# Patient Record
Sex: MALE | Race: ASIAN | NOT HISPANIC OR LATINO | URBAN - METROPOLITAN AREA
[De-identification: names, ages, dates, MRNs, and addresses within clinical notes are randomized per-mention and may not be internally consistent; named-entity substitution may affect disease eponyms.]

---

## 2021-01-01 ENCOUNTER — INPATIENT (INPATIENT)
Facility: HOSPITAL | Age: 71
LOS: 17 days | End: 2021-05-05
Attending: INTERNAL MEDICINE | Admitting: INTERNAL MEDICINE
Payer: MEDICARE

## 2021-01-01 VITALS
OXYGEN SATURATION: 89 % | HEART RATE: 103 BPM | TEMPERATURE: 100 F | RESPIRATION RATE: 24 BRPM | SYSTOLIC BLOOD PRESSURE: 122 MMHG | DIASTOLIC BLOOD PRESSURE: 72 MMHG

## 2021-01-01 VITALS — HEART RATE: 129 BPM

## 2021-01-01 DIAGNOSIS — R77.8 OTHER SPECIFIED ABNORMALITIES OF PLASMA PROTEINS: ICD-10-CM

## 2021-01-01 DIAGNOSIS — Z95.5 PRESENCE OF CORONARY ANGIOPLASTY IMPLANT AND GRAFT: Chronic | ICD-10-CM

## 2021-01-01 DIAGNOSIS — I25.10 ATHEROSCLEROTIC HEART DISEASE OF NATIVE CORONARY ARTERY WITHOUT ANGINA PECTORIS: ICD-10-CM

## 2021-01-01 DIAGNOSIS — E11.65 TYPE 2 DIABETES MELLITUS WITH HYPERGLYCEMIA: ICD-10-CM

## 2021-01-01 DIAGNOSIS — J96.01 ACUTE RESPIRATORY FAILURE WITH HYPOXIA: ICD-10-CM

## 2021-01-01 DIAGNOSIS — Z02.9 ENCOUNTER FOR ADMINISTRATIVE EXAMINATIONS, UNSPECIFIED: ICD-10-CM

## 2021-01-01 DIAGNOSIS — E78.5 HYPERLIPIDEMIA, UNSPECIFIED: ICD-10-CM

## 2021-01-01 DIAGNOSIS — I10 ESSENTIAL (PRIMARY) HYPERTENSION: ICD-10-CM

## 2021-01-01 DIAGNOSIS — U07.1 COVID-19: ICD-10-CM

## 2021-01-01 DIAGNOSIS — R09.02 HYPOXEMIA: ICD-10-CM

## 2021-01-01 DIAGNOSIS — I48.91 UNSPECIFIED ATRIAL FIBRILLATION: ICD-10-CM

## 2021-01-01 DIAGNOSIS — Z29.9 ENCOUNTER FOR PROPHYLACTIC MEASURES, UNSPECIFIED: ICD-10-CM

## 2021-01-01 LAB
-  AMIKACIN: SIGNIFICANT CHANGE UP
-  AMIKACIN: SIGNIFICANT CHANGE UP
-  AMOXICILLIN/CLAVULANIC ACID: SIGNIFICANT CHANGE UP
-  AMOXICILLIN/CLAVULANIC ACID: SIGNIFICANT CHANGE UP
-  AMPICILLIN/SULBACTAM: SIGNIFICANT CHANGE UP
-  AMPICILLIN/SULBACTAM: SIGNIFICANT CHANGE UP
-  AMPICILLIN: SIGNIFICANT CHANGE UP
-  AMPICILLIN: SIGNIFICANT CHANGE UP
-  AZTREONAM: SIGNIFICANT CHANGE UP
-  AZTREONAM: SIGNIFICANT CHANGE UP
-  CEFAZOLIN: SIGNIFICANT CHANGE UP
-  CEFAZOLIN: SIGNIFICANT CHANGE UP
-  CEFEPIME: SIGNIFICANT CHANGE UP
-  CEFEPIME: SIGNIFICANT CHANGE UP
-  CEFOXITIN: SIGNIFICANT CHANGE UP
-  CEFOXITIN: SIGNIFICANT CHANGE UP
-  CEFTRIAXONE: SIGNIFICANT CHANGE UP
-  CEFTRIAXONE: SIGNIFICANT CHANGE UP
-  CIPROFLOXACIN: SIGNIFICANT CHANGE UP
-  CIPROFLOXACIN: SIGNIFICANT CHANGE UP
-  ERTAPENEM: SIGNIFICANT CHANGE UP
-  ERTAPENEM: SIGNIFICANT CHANGE UP
-  GENTAMICIN: SIGNIFICANT CHANGE UP
-  GENTAMICIN: SIGNIFICANT CHANGE UP
-  IMIPENEM: SIGNIFICANT CHANGE UP
-  IMIPENEM: SIGNIFICANT CHANGE UP
-  LEVOFLOXACIN: SIGNIFICANT CHANGE UP
-  LEVOFLOXACIN: SIGNIFICANT CHANGE UP
-  MEROPENEM: SIGNIFICANT CHANGE UP
-  MEROPENEM: SIGNIFICANT CHANGE UP
-  PIPERACILLIN/TAZOBACTAM: SIGNIFICANT CHANGE UP
-  PIPERACILLIN/TAZOBACTAM: SIGNIFICANT CHANGE UP
-  TOBRAMYCIN: SIGNIFICANT CHANGE UP
-  TOBRAMYCIN: SIGNIFICANT CHANGE UP
-  TRIMETHOPRIM/SULFAMETHOXAZOLE: SIGNIFICANT CHANGE UP
-  TRIMETHOPRIM/SULFAMETHOXAZOLE: SIGNIFICANT CHANGE UP
A1C WITH ESTIMATED AVERAGE GLUCOSE RESULT: 8.2 % — HIGH (ref 4–5.6)
A1C WITH ESTIMATED AVERAGE GLUCOSE RESULT: 8.4 % — HIGH (ref 4–5.6)
ALBUMIN SERPL ELPH-MCNC: 1.3 G/DL — LOW (ref 3.3–5)
ALBUMIN SERPL ELPH-MCNC: 1.6 G/DL — LOW (ref 3.3–5)
ALBUMIN SERPL ELPH-MCNC: 1.6 G/DL — LOW (ref 3.3–5)
ALBUMIN SERPL ELPH-MCNC: 2.1 G/DL — LOW (ref 3.3–5)
ALBUMIN SERPL ELPH-MCNC: 2.1 G/DL — LOW (ref 3.3–5)
ALBUMIN SERPL ELPH-MCNC: 2.3 G/DL — LOW (ref 3.3–5)
ALBUMIN SERPL ELPH-MCNC: 2.4 G/DL — LOW (ref 3.3–5)
ALBUMIN SERPL ELPH-MCNC: 2.4 G/DL — LOW (ref 3.3–5)
ALBUMIN SERPL ELPH-MCNC: 2.5 G/DL — LOW (ref 3.3–5)
ALBUMIN SERPL ELPH-MCNC: 2.6 G/DL — LOW (ref 3.3–5)
ALBUMIN SERPL ELPH-MCNC: 2.7 G/DL — LOW (ref 3.3–5)
ALBUMIN SERPL ELPH-MCNC: 2.8 G/DL — LOW (ref 3.3–5)
ALBUMIN SERPL ELPH-MCNC: 2.9 G/DL — LOW (ref 3.3–5)
ALBUMIN SERPL ELPH-MCNC: 3.2 G/DL — LOW (ref 3.3–5)
ALBUMIN SERPL ELPH-MCNC: 3.3 G/DL — SIGNIFICANT CHANGE UP (ref 3.3–5)
ALBUMIN SERPL ELPH-MCNC: 3.4 G/DL — SIGNIFICANT CHANGE UP (ref 3.3–5)
ALBUMIN SERPL ELPH-MCNC: 3.5 G/DL — SIGNIFICANT CHANGE UP (ref 3.3–5)
ALP SERPL-CCNC: 115 U/L — SIGNIFICANT CHANGE UP (ref 40–120)
ALP SERPL-CCNC: 120 U/L — SIGNIFICANT CHANGE UP (ref 40–120)
ALP SERPL-CCNC: 125 U/L — HIGH (ref 40–120)
ALP SERPL-CCNC: 133 U/L — HIGH (ref 40–120)
ALP SERPL-CCNC: 134 U/L — HIGH (ref 40–120)
ALP SERPL-CCNC: 144 U/L — HIGH (ref 40–120)
ALP SERPL-CCNC: 148 U/L — HIGH (ref 40–120)
ALP SERPL-CCNC: 148 U/L — HIGH (ref 40–120)
ALP SERPL-CCNC: 149 U/L — HIGH (ref 40–120)
ALP SERPL-CCNC: 150 U/L — HIGH (ref 40–120)
ALP SERPL-CCNC: 150 U/L — HIGH (ref 40–120)
ALP SERPL-CCNC: 151 U/L — HIGH (ref 40–120)
ALP SERPL-CCNC: 152 U/L — HIGH (ref 40–120)
ALP SERPL-CCNC: 157 U/L — HIGH (ref 40–120)
ALP SERPL-CCNC: 158 U/L — HIGH (ref 40–120)
ALP SERPL-CCNC: 166 U/L — HIGH (ref 40–120)
ALP SERPL-CCNC: 170 U/L — HIGH (ref 40–120)
ALP SERPL-CCNC: 172 U/L — HIGH (ref 40–120)
ALP SERPL-CCNC: 176 U/L — HIGH (ref 40–120)
ALP SERPL-CCNC: 177 U/L — HIGH (ref 40–120)
ALP SERPL-CCNC: 179 U/L — HIGH (ref 40–120)
ALP SERPL-CCNC: 197 U/L — HIGH (ref 40–120)
ALP SERPL-CCNC: 197 U/L — HIGH (ref 40–120)
ALP SERPL-CCNC: 93 U/L — SIGNIFICANT CHANGE UP (ref 40–120)
ALP SERPL-CCNC: 94 U/L — SIGNIFICANT CHANGE UP (ref 40–120)
ALP SERPL-CCNC: 97 U/L — SIGNIFICANT CHANGE UP (ref 40–120)
ALT FLD-CCNC: 14 U/L — SIGNIFICANT CHANGE UP (ref 4–41)
ALT FLD-CCNC: 15 U/L — SIGNIFICANT CHANGE UP (ref 4–41)
ALT FLD-CCNC: 15 U/L — SIGNIFICANT CHANGE UP (ref 4–41)
ALT FLD-CCNC: 16 U/L — SIGNIFICANT CHANGE UP (ref 4–41)
ALT FLD-CCNC: 16 U/L — SIGNIFICANT CHANGE UP (ref 4–41)
ALT FLD-CCNC: 18 U/L — SIGNIFICANT CHANGE UP (ref 4–41)
ALT FLD-CCNC: 19 U/L — SIGNIFICANT CHANGE UP (ref 4–41)
ALT FLD-CCNC: 20 U/L — SIGNIFICANT CHANGE UP (ref 4–41)
ALT FLD-CCNC: 21 U/L — SIGNIFICANT CHANGE UP (ref 4–41)
ALT FLD-CCNC: 22 U/L — SIGNIFICANT CHANGE UP (ref 4–41)
ALT FLD-CCNC: 22 U/L — SIGNIFICANT CHANGE UP (ref 4–41)
ALT FLD-CCNC: 23 U/L — SIGNIFICANT CHANGE UP (ref 4–41)
ALT FLD-CCNC: 26 U/L — SIGNIFICANT CHANGE UP (ref 4–41)
ALT FLD-CCNC: 26 U/L — SIGNIFICANT CHANGE UP (ref 4–41)
ALT FLD-CCNC: 27 U/L — SIGNIFICANT CHANGE UP (ref 4–41)
ALT FLD-CCNC: 32 U/L — SIGNIFICANT CHANGE UP (ref 4–41)
ALT FLD-CCNC: 32 U/L — SIGNIFICANT CHANGE UP (ref 4–41)
ALT FLD-CCNC: 33 U/L — SIGNIFICANT CHANGE UP (ref 4–41)
ALT FLD-CCNC: 36 U/L — SIGNIFICANT CHANGE UP (ref 4–41)
ALT FLD-CCNC: 41 U/L — SIGNIFICANT CHANGE UP (ref 4–41)
ALT FLD-CCNC: 44 U/L — HIGH (ref 4–41)
ALT FLD-CCNC: 47 U/L — HIGH (ref 4–41)
ALT FLD-CCNC: 49 U/L — HIGH (ref 4–41)
ALT FLD-CCNC: 54 U/L — HIGH (ref 4–41)
ALT FLD-CCNC: 55 U/L — HIGH (ref 4–41)
ALT FLD-CCNC: 783 U/L — HIGH (ref 4–41)
ANION GAP SERPL CALC-SCNC: 10 MMOL/L — SIGNIFICANT CHANGE UP (ref 7–14)
ANION GAP SERPL CALC-SCNC: 11 MMOL/L — SIGNIFICANT CHANGE UP (ref 7–14)
ANION GAP SERPL CALC-SCNC: 12 MMOL/L — SIGNIFICANT CHANGE UP (ref 7–14)
ANION GAP SERPL CALC-SCNC: 13 MMOL/L — SIGNIFICANT CHANGE UP (ref 7–14)
ANION GAP SERPL CALC-SCNC: 13 MMOL/L — SIGNIFICANT CHANGE UP (ref 7–14)
ANION GAP SERPL CALC-SCNC: 14 MMOL/L — SIGNIFICANT CHANGE UP (ref 7–14)
ANION GAP SERPL CALC-SCNC: 18 MMOL/L — HIGH (ref 7–14)
ANION GAP SERPL CALC-SCNC: 5 MMOL/L — LOW (ref 7–14)
ANION GAP SERPL CALC-SCNC: 6 MMOL/L — LOW (ref 7–14)
ANION GAP SERPL CALC-SCNC: 6 MMOL/L — LOW (ref 7–14)
ANION GAP SERPL CALC-SCNC: 7 MMOL/L — SIGNIFICANT CHANGE UP (ref 7–14)
ANION GAP SERPL CALC-SCNC: 8 MMOL/L — SIGNIFICANT CHANGE UP (ref 7–14)
ANION GAP SERPL CALC-SCNC: 9 MMOL/L — SIGNIFICANT CHANGE UP (ref 7–14)
ANION GAP SERPL CALC-SCNC: 9 MMOL/L — SIGNIFICANT CHANGE UP (ref 7–14)
ANISOCYTOSIS BLD QL: SLIGHT — SIGNIFICANT CHANGE UP
ANISOCYTOSIS BLD QL: SLIGHT — SIGNIFICANT CHANGE UP
APPEARANCE UR: ABNORMAL
APPEARANCE UR: CLEAR — SIGNIFICANT CHANGE UP
APTT BLD: 100.5 SEC — HIGH (ref 27–36.3)
APTT BLD: 102.7 SEC — HIGH (ref 27–36.3)
APTT BLD: 161.1 SEC — CRITICAL HIGH (ref 27–36.3)
APTT BLD: 169.9 SEC — CRITICAL HIGH (ref 27–36.3)
APTT BLD: 31.3 SEC — SIGNIFICANT CHANGE UP (ref 27–36.3)
APTT BLD: 31.7 SEC — SIGNIFICANT CHANGE UP (ref 27–36.3)
APTT BLD: 33.9 SEC — SIGNIFICANT CHANGE UP (ref 27–36.3)
APTT BLD: 39.9 SEC — HIGH (ref 27–36.3)
APTT BLD: 42.3 SEC — HIGH (ref 27–36.3)
APTT BLD: 47.5 SEC — HIGH (ref 27–36.3)
APTT BLD: 47.9 SEC — HIGH (ref 27–36.3)
APTT BLD: 58.1 SEC — HIGH (ref 27–36.3)
APTT BLD: 71.3 SEC — HIGH (ref 27–36.3)
APTT BLD: 74.2 SEC — HIGH (ref 27–36.3)
APTT BLD: 81.3 SEC — HIGH (ref 27–36.3)
APTT BLD: 85.8 SEC — HIGH (ref 27–36.3)
APTT BLD: 89 SEC — HIGH (ref 27–36.3)
APTT BLD: 90.6 SEC — HIGH (ref 27–36.3)
APTT BLD: 91.5 SEC — HIGH (ref 27–36.3)
APTT BLD: 96.7 SEC — HIGH (ref 27–36.3)
APTT BLD: 98.4 SEC — HIGH (ref 27–36.3)
AST SERPL-CCNC: 12 U/L — SIGNIFICANT CHANGE UP (ref 4–40)
AST SERPL-CCNC: 14 U/L — SIGNIFICANT CHANGE UP (ref 4–40)
AST SERPL-CCNC: 15 U/L — SIGNIFICANT CHANGE UP (ref 4–40)
AST SERPL-CCNC: 15 U/L — SIGNIFICANT CHANGE UP (ref 4–40)
AST SERPL-CCNC: 17 U/L — SIGNIFICANT CHANGE UP (ref 4–40)
AST SERPL-CCNC: 17 U/L — SIGNIFICANT CHANGE UP (ref 4–40)
AST SERPL-CCNC: 18 U/L — SIGNIFICANT CHANGE UP (ref 4–40)
AST SERPL-CCNC: 18 U/L — SIGNIFICANT CHANGE UP (ref 4–40)
AST SERPL-CCNC: 19 U/L — SIGNIFICANT CHANGE UP (ref 4–40)
AST SERPL-CCNC: 19 U/L — SIGNIFICANT CHANGE UP (ref 4–40)
AST SERPL-CCNC: 20 U/L — SIGNIFICANT CHANGE UP (ref 4–40)
AST SERPL-CCNC: 20 U/L — SIGNIFICANT CHANGE UP (ref 4–40)
AST SERPL-CCNC: 21 U/L — SIGNIFICANT CHANGE UP (ref 4–40)
AST SERPL-CCNC: 22 U/L — SIGNIFICANT CHANGE UP (ref 4–40)
AST SERPL-CCNC: 2308 U/L — HIGH (ref 4–40)
AST SERPL-CCNC: 26 U/L — SIGNIFICANT CHANGE UP (ref 4–40)
AST SERPL-CCNC: 28 U/L — SIGNIFICANT CHANGE UP (ref 4–40)
AST SERPL-CCNC: 31 U/L — SIGNIFICANT CHANGE UP (ref 4–40)
AST SERPL-CCNC: 31 U/L — SIGNIFICANT CHANGE UP (ref 4–40)
AST SERPL-CCNC: 32 U/L — SIGNIFICANT CHANGE UP (ref 4–40)
AST SERPL-CCNC: 42 U/L — HIGH (ref 4–40)
AST SERPL-CCNC: 44 U/L — HIGH (ref 4–40)
AST SERPL-CCNC: 46 U/L — HIGH (ref 4–40)
AST SERPL-CCNC: 48 U/L — HIGH (ref 4–40)
AST SERPL-CCNC: 55 U/L — HIGH (ref 4–40)
AST SERPL-CCNC: 57 U/L — HIGH (ref 4–40)
B PERT DNA SPEC QL NAA+PROBE: SIGNIFICANT CHANGE UP
BACTERIA # UR AUTO: NEGATIVE — SIGNIFICANT CHANGE UP
BACTERIA # UR AUTO: NEGATIVE — SIGNIFICANT CHANGE UP
BASE EXCESS BLDA CALC-SCNC: -1.5 MMOL/L — SIGNIFICANT CHANGE UP (ref -2–2)
BASE EXCESS BLDA CALC-SCNC: 0.6 MMOL/L — SIGNIFICANT CHANGE UP (ref -2–2)
BASE EXCESS BLDV CALC-SCNC: -2.2 MMOL/L — SIGNIFICANT CHANGE UP (ref -3–2)
BASOPHILS # BLD AUTO: 0 K/UL — SIGNIFICANT CHANGE UP (ref 0–0.2)
BASOPHILS # BLD AUTO: 0.02 K/UL — SIGNIFICANT CHANGE UP (ref 0–0.2)
BASOPHILS NFR BLD AUTO: 0 % — SIGNIFICANT CHANGE UP (ref 0–2)
BASOPHILS NFR BLD AUTO: 0.1 % — SIGNIFICANT CHANGE UP (ref 0–2)
BASOPHILS NFR BLD AUTO: 0.2 % — SIGNIFICANT CHANGE UP (ref 0–2)
BILIRUB DIRECT SERPL-MCNC: 0.2 MG/DL — SIGNIFICANT CHANGE UP (ref 0–0.2)
BILIRUB DIRECT SERPL-MCNC: 0.3 MG/DL — HIGH (ref 0–0.2)
BILIRUB DIRECT SERPL-MCNC: 0.3 MG/DL — HIGH (ref 0–0.2)
BILIRUB DIRECT SERPL-MCNC: 0.4 MG/DL — HIGH (ref 0–0.2)
BILIRUB DIRECT SERPL-MCNC: 0.6 MG/DL — HIGH (ref 0–0.2)
BILIRUB DIRECT SERPL-MCNC: 0.6 MG/DL — HIGH (ref 0–0.2)
BILIRUB DIRECT SERPL-MCNC: <0.2 MG/DL — SIGNIFICANT CHANGE UP (ref 0–0.2)
BILIRUB INDIRECT FLD-MCNC: 0.6 MG/DL — SIGNIFICANT CHANGE UP (ref 0–1)
BILIRUB INDIRECT FLD-MCNC: 0.6 MG/DL — SIGNIFICANT CHANGE UP (ref 0–1)
BILIRUB INDIRECT FLD-MCNC: 0.7 MG/DL — SIGNIFICANT CHANGE UP (ref 0–1)
BILIRUB INDIRECT FLD-MCNC: 0.7 MG/DL — SIGNIFICANT CHANGE UP (ref 0–1)
BILIRUB INDIRECT FLD-MCNC: 0.8 MG/DL — SIGNIFICANT CHANGE UP (ref 0–1)
BILIRUB INDIRECT FLD-MCNC: 0.9 MG/DL — SIGNIFICANT CHANGE UP (ref 0–1)
BILIRUB INDIRECT FLD-MCNC: >0.4 MG/DL — SIGNIFICANT CHANGE UP (ref 0–1)
BILIRUB SERPL-MCNC: 0.6 MG/DL — SIGNIFICANT CHANGE UP (ref 0.2–1.2)
BILIRUB SERPL-MCNC: 0.6 MG/DL — SIGNIFICANT CHANGE UP (ref 0.2–1.2)
BILIRUB SERPL-MCNC: 0.7 MG/DL — SIGNIFICANT CHANGE UP (ref 0.2–1.2)
BILIRUB SERPL-MCNC: 0.8 MG/DL — SIGNIFICANT CHANGE UP (ref 0.2–1.2)
BILIRUB SERPL-MCNC: 0.8 MG/DL — SIGNIFICANT CHANGE UP (ref 0.2–1.2)
BILIRUB SERPL-MCNC: 0.9 MG/DL — SIGNIFICANT CHANGE UP (ref 0.2–1.2)
BILIRUB SERPL-MCNC: 1 MG/DL — SIGNIFICANT CHANGE UP (ref 0.2–1.2)
BILIRUB SERPL-MCNC: 1.1 MG/DL — SIGNIFICANT CHANGE UP (ref 0.2–1.2)
BILIRUB SERPL-MCNC: 1.1 MG/DL — SIGNIFICANT CHANGE UP (ref 0.2–1.2)
BILIRUB SERPL-MCNC: 1.2 MG/DL — SIGNIFICANT CHANGE UP (ref 0.2–1.2)
BILIRUB SERPL-MCNC: 1.3 MG/DL — HIGH (ref 0.2–1.2)
BILIRUB SERPL-MCNC: 1.3 MG/DL — HIGH (ref 0.2–1.2)
BILIRUB SERPL-MCNC: 1.4 MG/DL — HIGH (ref 0.2–1.2)
BILIRUB SERPL-MCNC: 1.4 MG/DL — HIGH (ref 0.2–1.2)
BILIRUB SERPL-MCNC: 1.6 MG/DL — HIGH (ref 0.2–1.2)
BILIRUB UR-MCNC: NEGATIVE — SIGNIFICANT CHANGE UP
BILIRUB UR-MCNC: NEGATIVE — SIGNIFICANT CHANGE UP
BLD GP AB SCN SERPL QL: NEGATIVE — SIGNIFICANT CHANGE UP
BLOOD GAS ARTERIAL - LYTES,HGB,ICA,LACT RESULT: SIGNIFICANT CHANGE UP
BLOOD GAS ARTERIAL COMPREHENSIVE RESULT: SIGNIFICANT CHANGE UP
BLOOD GAS VENOUS - CREATININE: 0.8 MG/DL — SIGNIFICANT CHANGE UP (ref 0.5–1.3)
BLOOD GAS VENOUS COMPREHENSIVE RESULT: SIGNIFICANT CHANGE UP
BLOOD GAS VENOUS COMPREHENSIVE RESULT: SIGNIFICANT CHANGE UP
BUN SERPL-MCNC: 25 MG/DL — HIGH (ref 7–23)
BUN SERPL-MCNC: 28 MG/DL — HIGH (ref 7–23)
BUN SERPL-MCNC: 29 MG/DL — HIGH (ref 7–23)
BUN SERPL-MCNC: 30 MG/DL — HIGH (ref 7–23)
BUN SERPL-MCNC: 30 MG/DL — HIGH (ref 7–23)
BUN SERPL-MCNC: 31 MG/DL — HIGH (ref 7–23)
BUN SERPL-MCNC: 32 MG/DL — HIGH (ref 7–23)
BUN SERPL-MCNC: 34 MG/DL — HIGH (ref 7–23)
BUN SERPL-MCNC: 35 MG/DL — HIGH (ref 7–23)
BUN SERPL-MCNC: 37 MG/DL — HIGH (ref 7–23)
BUN SERPL-MCNC: 37 MG/DL — HIGH (ref 7–23)
BUN SERPL-MCNC: 42 MG/DL — HIGH (ref 7–23)
BUN SERPL-MCNC: 43 MG/DL — HIGH (ref 7–23)
BUN SERPL-MCNC: 50 MG/DL — HIGH (ref 7–23)
BUN SERPL-MCNC: 52 MG/DL — HIGH (ref 7–23)
BURR CELLS BLD QL SMEAR: PRESENT — SIGNIFICANT CHANGE UP
C PNEUM DNA SPEC QL NAA+PROBE: SIGNIFICANT CHANGE UP
CALCIUM SERPL-MCNC: 7.3 MG/DL — LOW (ref 8.4–10.5)
CALCIUM SERPL-MCNC: 7.5 MG/DL — LOW (ref 8.4–10.5)
CALCIUM SERPL-MCNC: 7.7 MG/DL — LOW (ref 8.4–10.5)
CALCIUM SERPL-MCNC: 7.8 MG/DL — LOW (ref 8.4–10.5)
CALCIUM SERPL-MCNC: 7.9 MG/DL — LOW (ref 8.4–10.5)
CALCIUM SERPL-MCNC: 8 MG/DL — LOW (ref 8.4–10.5)
CALCIUM SERPL-MCNC: 8.1 MG/DL — LOW (ref 8.4–10.5)
CALCIUM SERPL-MCNC: 8.1 MG/DL — LOW (ref 8.4–10.5)
CALCIUM SERPL-MCNC: 8.2 MG/DL — LOW (ref 8.4–10.5)
CALCIUM SERPL-MCNC: 8.3 MG/DL — LOW (ref 8.4–10.5)
CALCIUM SERPL-MCNC: 8.5 MG/DL — SIGNIFICANT CHANGE UP (ref 8.4–10.5)
CALCIUM SERPL-MCNC: 8.8 MG/DL — SIGNIFICANT CHANGE UP (ref 8.4–10.5)
CHLORIDE BLDV-SCNC: 110 MMOL/L — HIGH (ref 96–108)
CHLORIDE SERPL-SCNC: 100 MMOL/L — SIGNIFICANT CHANGE UP (ref 98–107)
CHLORIDE SERPL-SCNC: 101 MMOL/L — SIGNIFICANT CHANGE UP (ref 98–107)
CHLORIDE SERPL-SCNC: 102 MMOL/L — SIGNIFICANT CHANGE UP (ref 98–107)
CHLORIDE SERPL-SCNC: 102 MMOL/L — SIGNIFICANT CHANGE UP (ref 98–107)
CHLORIDE SERPL-SCNC: 104 MMOL/L — SIGNIFICANT CHANGE UP (ref 98–107)
CHLORIDE SERPL-SCNC: 92 MMOL/L — LOW (ref 98–107)
CHLORIDE SERPL-SCNC: 93 MMOL/L — LOW (ref 98–107)
CHLORIDE SERPL-SCNC: 95 MMOL/L — LOW (ref 98–107)
CHLORIDE SERPL-SCNC: 97 MMOL/L — LOW (ref 98–107)
CHLORIDE SERPL-SCNC: 97 MMOL/L — LOW (ref 98–107)
CHLORIDE SERPL-SCNC: 99 MMOL/L — SIGNIFICANT CHANGE UP (ref 98–107)
CHLORIDE UR-SCNC: <20 MMOL/L — SIGNIFICANT CHANGE UP
CHOLEST SERPL-MCNC: 101 MG/DL — SIGNIFICANT CHANGE UP
CK MB BLD-MCNC: 1.7 % — SIGNIFICANT CHANGE UP (ref 0–2.5)
CK MB BLD-MCNC: 2.2 % — SIGNIFICANT CHANGE UP (ref 0–2.5)
CK MB CFR SERPL CALC: 1.5 NG/ML — SIGNIFICANT CHANGE UP
CK MB CFR SERPL CALC: 2.8 NG/ML — SIGNIFICANT CHANGE UP
CK SERPL-CCNC: 130 U/L — SIGNIFICANT CHANGE UP (ref 30–200)
CK SERPL-CCNC: 88 U/L — SIGNIFICANT CHANGE UP (ref 30–200)
CO2 SERPL-SCNC: 19 MMOL/L — LOW (ref 22–31)
CO2 SERPL-SCNC: 20 MMOL/L — LOW (ref 22–31)
CO2 SERPL-SCNC: 20 MMOL/L — LOW (ref 22–31)
CO2 SERPL-SCNC: 21 MMOL/L — LOW (ref 22–31)
CO2 SERPL-SCNC: 22 MMOL/L — SIGNIFICANT CHANGE UP (ref 22–31)
CO2 SERPL-SCNC: 23 MMOL/L — SIGNIFICANT CHANGE UP (ref 22–31)
CO2 SERPL-SCNC: 23 MMOL/L — SIGNIFICANT CHANGE UP (ref 22–31)
CO2 SERPL-SCNC: 24 MMOL/L — SIGNIFICANT CHANGE UP (ref 22–31)
CO2 SERPL-SCNC: 26 MMOL/L — SIGNIFICANT CHANGE UP (ref 22–31)
CO2 SERPL-SCNC: 27 MMOL/L — SIGNIFICANT CHANGE UP (ref 22–31)
CO2 SERPL-SCNC: 27 MMOL/L — SIGNIFICANT CHANGE UP (ref 22–31)
CO2 SERPL-SCNC: 30 MMOL/L — SIGNIFICANT CHANGE UP (ref 22–31)
CO2 SERPL-SCNC: 31 MMOL/L — SIGNIFICANT CHANGE UP (ref 22–31)
COLOR SPEC: YELLOW — SIGNIFICANT CHANGE UP
COLOR SPEC: YELLOW — SIGNIFICANT CHANGE UP
COVID-19 SPIKE DOMAIN AB INTERP: POSITIVE
COVID-19 SPIKE DOMAIN ANTIBODY RESULT: >250 U/ML — HIGH
CREAT ?TM UR-MCNC: 84 MG/DL — SIGNIFICANT CHANGE UP
CREAT SERPL-MCNC: 0.54 MG/DL — SIGNIFICANT CHANGE UP (ref 0.5–1.3)
CREAT SERPL-MCNC: 0.55 MG/DL — SIGNIFICANT CHANGE UP (ref 0.5–1.3)
CREAT SERPL-MCNC: 0.56 MG/DL — SIGNIFICANT CHANGE UP (ref 0.5–1.3)
CREAT SERPL-MCNC: 0.58 MG/DL — SIGNIFICANT CHANGE UP (ref 0.5–1.3)
CREAT SERPL-MCNC: 0.59 MG/DL — SIGNIFICANT CHANGE UP (ref 0.5–1.3)
CREAT SERPL-MCNC: 0.59 MG/DL — SIGNIFICANT CHANGE UP (ref 0.5–1.3)
CREAT SERPL-MCNC: 0.6 MG/DL — SIGNIFICANT CHANGE UP (ref 0.5–1.3)
CREAT SERPL-MCNC: 0.63 MG/DL — SIGNIFICANT CHANGE UP (ref 0.5–1.3)
CREAT SERPL-MCNC: 0.63 MG/DL — SIGNIFICANT CHANGE UP (ref 0.5–1.3)
CREAT SERPL-MCNC: 0.64 MG/DL — SIGNIFICANT CHANGE UP (ref 0.5–1.3)
CREAT SERPL-MCNC: 0.66 MG/DL — SIGNIFICANT CHANGE UP (ref 0.5–1.3)
CREAT SERPL-MCNC: 0.67 MG/DL — SIGNIFICANT CHANGE UP (ref 0.5–1.3)
CREAT SERPL-MCNC: 0.68 MG/DL — SIGNIFICANT CHANGE UP (ref 0.5–1.3)
CREAT SERPL-MCNC: 0.69 MG/DL — SIGNIFICANT CHANGE UP (ref 0.5–1.3)
CREAT SERPL-MCNC: 0.72 MG/DL — SIGNIFICANT CHANGE UP (ref 0.5–1.3)
CREAT SERPL-MCNC: 0.72 MG/DL — SIGNIFICANT CHANGE UP (ref 0.5–1.3)
CREAT SERPL-MCNC: 0.73 MG/DL — SIGNIFICANT CHANGE UP (ref 0.5–1.3)
CREAT SERPL-MCNC: 0.75 MG/DL — SIGNIFICANT CHANGE UP (ref 0.5–1.3)
CREAT SERPL-MCNC: 0.76 MG/DL — SIGNIFICANT CHANGE UP (ref 0.5–1.3)
CREAT SERPL-MCNC: 0.97 MG/DL — SIGNIFICANT CHANGE UP (ref 0.5–1.3)
CRP SERPL-MCNC: 117.2 MG/L — HIGH
CRP SERPL-MCNC: 148.6 MG/L — HIGH
CRP SERPL-MCNC: 155.6 MG/L — HIGH
CRP SERPL-MCNC: 299.7 MG/L — HIGH
CRP SERPL-MCNC: 64.8 MG/L — HIGH
CULTURE RESULTS: SIGNIFICANT CHANGE UP
D DIMER BLD IA.RAPID-MCNC: 378 NG/ML DDU — HIGH
D DIMER BLD IA.RAPID-MCNC: 440 NG/ML DDU — HIGH
D DIMER BLD IA.RAPID-MCNC: 452 NG/ML DDU — HIGH
D DIMER BLD IA.RAPID-MCNC: 472 NG/ML DDU — HIGH
D DIMER BLD IA.RAPID-MCNC: 472 NG/ML DDU — HIGH
D DIMER BLD IA.RAPID-MCNC: 552 NG/ML DDU — HIGH
D DIMER BLD IA.RAPID-MCNC: 572 NG/ML DDU — HIGH
D DIMER BLD IA.RAPID-MCNC: 617 NG/ML DDU — HIGH
DIFF PNL FLD: NEGATIVE — SIGNIFICANT CHANGE UP
DIFF PNL FLD: NEGATIVE — SIGNIFICANT CHANGE UP
ELLIPTOCYTES BLD QL SMEAR: SLIGHT — SIGNIFICANT CHANGE UP
EOSINOPHIL # BLD AUTO: 0 K/UL — SIGNIFICANT CHANGE UP (ref 0–0.5)
EOSINOPHIL # BLD AUTO: 0.12 K/UL — SIGNIFICANT CHANGE UP (ref 0–0.5)
EOSINOPHIL # BLD AUTO: 0.24 K/UL — SIGNIFICANT CHANGE UP (ref 0–0.5)
EOSINOPHIL # BLD AUTO: 0.32 K/UL — SIGNIFICANT CHANGE UP (ref 0–0.5)
EOSINOPHIL NFR BLD AUTO: 0 % — SIGNIFICANT CHANGE UP (ref 0–6)
EOSINOPHIL NFR BLD AUTO: 0.9 % — SIGNIFICANT CHANGE UP (ref 0–6)
EOSINOPHIL NFR BLD AUTO: 1.6 % — SIGNIFICANT CHANGE UP (ref 0–6)
EOSINOPHIL NFR BLD AUTO: 1.8 % — SIGNIFICANT CHANGE UP (ref 0–6)
EPI CELLS # UR: 1 /HPF — SIGNIFICANT CHANGE UP (ref 0–5)
EPI CELLS # UR: SIGNIFICANT CHANGE UP /HPF (ref 0–5)
ESTIMATED AVERAGE GLUCOSE: 189 MG/DL — HIGH (ref 68–114)
ESTIMATED AVERAGE GLUCOSE: 194 MG/DL — HIGH (ref 68–114)
FERRITIN SERPL-MCNC: 1173 NG/ML — HIGH (ref 30–400)
FERRITIN SERPL-MCNC: 1210 NG/ML — HIGH (ref 30–400)
FERRITIN SERPL-MCNC: 1307 NG/ML — HIGH (ref 30–400)
FERRITIN SERPL-MCNC: 1331 NG/ML — HIGH (ref 30–400)
FERRITIN SERPL-MCNC: 1538 NG/ML — HIGH (ref 30–400)
FERRITIN SERPL-MCNC: 1545 NG/ML — HIGH (ref 30–400)
FERRITIN SERPL-MCNC: 3802 NG/ML — HIGH (ref 30–400)
FERRITIN SERPL-MCNC: 4663 NG/ML — HIGH (ref 30–400)
FERRITIN SERPL-MCNC: 996 NG/ML — HIGH (ref 30–400)
FLUAV SUBTYP SPEC NAA+PROBE: SIGNIFICANT CHANGE UP
FLUBV RNA SPEC QL NAA+PROBE: SIGNIFICANT CHANGE UP
GAS PNL BLDA: SIGNIFICANT CHANGE UP
GAS PNL BLDV: 134 MMOL/L — LOW (ref 136–146)
GIANT PLATELETS BLD QL SMEAR: PRESENT — SIGNIFICANT CHANGE UP
GLUCOSE BLDC GLUCOMTR-MCNC: 142 MG/DL — HIGH (ref 70–99)
GLUCOSE BLDC GLUCOMTR-MCNC: 149 MG/DL — HIGH (ref 70–99)
GLUCOSE BLDC GLUCOMTR-MCNC: 151 MG/DL — HIGH (ref 70–99)
GLUCOSE BLDC GLUCOMTR-MCNC: 153 MG/DL — HIGH (ref 70–99)
GLUCOSE BLDC GLUCOMTR-MCNC: 159 MG/DL — HIGH (ref 70–99)
GLUCOSE BLDC GLUCOMTR-MCNC: 169 MG/DL — HIGH (ref 70–99)
GLUCOSE BLDC GLUCOMTR-MCNC: 182 MG/DL — HIGH (ref 70–99)
GLUCOSE BLDC GLUCOMTR-MCNC: 196 MG/DL — HIGH (ref 70–99)
GLUCOSE BLDC GLUCOMTR-MCNC: 201 MG/DL — HIGH (ref 70–99)
GLUCOSE BLDC GLUCOMTR-MCNC: 203 MG/DL — HIGH (ref 70–99)
GLUCOSE BLDC GLUCOMTR-MCNC: 206 MG/DL — HIGH (ref 70–99)
GLUCOSE BLDC GLUCOMTR-MCNC: 208 MG/DL — HIGH (ref 70–99)
GLUCOSE BLDC GLUCOMTR-MCNC: 218 MG/DL — HIGH (ref 70–99)
GLUCOSE BLDC GLUCOMTR-MCNC: 222 MG/DL — HIGH (ref 70–99)
GLUCOSE BLDC GLUCOMTR-MCNC: 232 MG/DL — HIGH (ref 70–99)
GLUCOSE BLDC GLUCOMTR-MCNC: 232 MG/DL — HIGH (ref 70–99)
GLUCOSE BLDC GLUCOMTR-MCNC: 234 MG/DL — HIGH (ref 70–99)
GLUCOSE BLDC GLUCOMTR-MCNC: 288 MG/DL — HIGH (ref 70–99)
GLUCOSE BLDC GLUCOMTR-MCNC: 296 MG/DL — HIGH (ref 70–99)
GLUCOSE BLDV-MCNC: 246 MG/DL — HIGH (ref 70–99)
GLUCOSE SERPL-MCNC: 111 MG/DL — HIGH (ref 70–99)
GLUCOSE SERPL-MCNC: 133 MG/DL — HIGH (ref 70–99)
GLUCOSE SERPL-MCNC: 153 MG/DL — HIGH (ref 70–99)
GLUCOSE SERPL-MCNC: 180 MG/DL — HIGH (ref 70–99)
GLUCOSE SERPL-MCNC: 194 MG/DL — HIGH (ref 70–99)
GLUCOSE SERPL-MCNC: 200 MG/DL — HIGH (ref 70–99)
GLUCOSE SERPL-MCNC: 212 MG/DL — HIGH (ref 70–99)
GLUCOSE SERPL-MCNC: 212 MG/DL — HIGH (ref 70–99)
GLUCOSE SERPL-MCNC: 213 MG/DL — HIGH (ref 70–99)
GLUCOSE SERPL-MCNC: 241 MG/DL — HIGH (ref 70–99)
GLUCOSE SERPL-MCNC: 242 MG/DL — HIGH (ref 70–99)
GLUCOSE SERPL-MCNC: 244 MG/DL — HIGH (ref 70–99)
GLUCOSE SERPL-MCNC: 249 MG/DL — HIGH (ref 70–99)
GLUCOSE SERPL-MCNC: 255 MG/DL — HIGH (ref 70–99)
GLUCOSE SERPL-MCNC: 258 MG/DL — HIGH (ref 70–99)
GLUCOSE SERPL-MCNC: 260 MG/DL — HIGH (ref 70–99)
GLUCOSE SERPL-MCNC: 272 MG/DL — HIGH (ref 70–99)
GLUCOSE SERPL-MCNC: 281 MG/DL — HIGH (ref 70–99)
GLUCOSE SERPL-MCNC: 95 MG/DL — SIGNIFICANT CHANGE UP (ref 70–99)
GLUCOSE UR QL: ABNORMAL
GLUCOSE UR QL: ABNORMAL
GRAM STN FLD: SIGNIFICANT CHANGE UP
GRAM STN FLD: SIGNIFICANT CHANGE UP
HADV DNA SPEC QL NAA+PROBE: SIGNIFICANT CHANGE UP
HCO3 BLDA-SCNC: 24 MMOL/L — SIGNIFICANT CHANGE UP (ref 22–26)
HCO3 BLDA-SCNC: 24 MMOL/L — SIGNIFICANT CHANGE UP (ref 22–26)
HCO3 BLDV-SCNC: 23 MMOL/L — SIGNIFICANT CHANGE UP (ref 20–27)
HCOV 229E RNA SPEC QL NAA+PROBE: SIGNIFICANT CHANGE UP
HCOV HKU1 RNA SPEC QL NAA+PROBE: SIGNIFICANT CHANGE UP
HCOV NL63 RNA SPEC QL NAA+PROBE: SIGNIFICANT CHANGE UP
HCOV OC43 RNA SPEC QL NAA+PROBE: SIGNIFICANT CHANGE UP
HCT VFR BLD CALC: 30 % — LOW (ref 39–50)
HCT VFR BLD CALC: 30.9 % — LOW (ref 39–50)
HCT VFR BLD CALC: 33.2 % — LOW (ref 39–50)
HCT VFR BLD CALC: 33.8 % — LOW (ref 39–50)
HCT VFR BLD CALC: 34.1 % — LOW (ref 39–50)
HCT VFR BLD CALC: 36.7 % — LOW (ref 39–50)
HCT VFR BLD CALC: 39 % — SIGNIFICANT CHANGE UP (ref 39–50)
HCT VFR BLD CALC: 40 % — SIGNIFICANT CHANGE UP (ref 39–50)
HCT VFR BLD CALC: 40.1 % — SIGNIFICANT CHANGE UP (ref 39–50)
HCT VFR BLD CALC: 40.5 % — SIGNIFICANT CHANGE UP (ref 39–50)
HCT VFR BLD CALC: 40.6 % — SIGNIFICANT CHANGE UP (ref 39–50)
HCT VFR BLD CALC: 40.8 % — SIGNIFICANT CHANGE UP (ref 39–50)
HCT VFR BLD CALC: 41 % — SIGNIFICANT CHANGE UP (ref 39–50)
HCT VFR BLD CALC: 41.1 % — SIGNIFICANT CHANGE UP (ref 39–50)
HCT VFR BLD CALC: 41.9 % — SIGNIFICANT CHANGE UP (ref 39–50)
HCT VFR BLD CALC: 42.1 % — SIGNIFICANT CHANGE UP (ref 39–50)
HCT VFR BLD CALC: 42.3 % — SIGNIFICANT CHANGE UP (ref 39–50)
HCT VFR BLD CALC: 42.4 % — SIGNIFICANT CHANGE UP (ref 39–50)
HCT VFR BLD CALC: 42.5 % — SIGNIFICANT CHANGE UP (ref 39–50)
HCT VFR BLD CALC: 43.1 % — SIGNIFICANT CHANGE UP (ref 39–50)
HCT VFR BLD CALC: 44.3 % — SIGNIFICANT CHANGE UP (ref 39–50)
HCT VFR BLD CALC: 46.6 % — SIGNIFICANT CHANGE UP (ref 39–50)
HCT VFR BLDA CALC: 39.5 % — SIGNIFICANT CHANGE UP (ref 39–51)
HCV AB S/CO SERPL IA: 0.11 S/CO — SIGNIFICANT CHANGE UP (ref 0–0.99)
HCV AB SERPL-IMP: SIGNIFICANT CHANGE UP
HDLC SERPL-MCNC: 31 MG/DL — LOW
HGB BLD CALC-MCNC: 12.8 G/DL — LOW (ref 13–17)
HGB BLD-MCNC: 10.2 G/DL — LOW (ref 13–17)
HGB BLD-MCNC: 10.9 G/DL — LOW (ref 13–17)
HGB BLD-MCNC: 11.1 G/DL — LOW (ref 13–17)
HGB BLD-MCNC: 11.3 G/DL — LOW (ref 13–17)
HGB BLD-MCNC: 11.9 G/DL — LOW (ref 13–17)
HGB BLD-MCNC: 13.2 G/DL — SIGNIFICANT CHANGE UP (ref 13–17)
HGB BLD-MCNC: 13.3 G/DL — SIGNIFICANT CHANGE UP (ref 13–17)
HGB BLD-MCNC: 13.4 G/DL — SIGNIFICANT CHANGE UP (ref 13–17)
HGB BLD-MCNC: 13.6 G/DL — SIGNIFICANT CHANGE UP (ref 13–17)
HGB BLD-MCNC: 13.6 G/DL — SIGNIFICANT CHANGE UP (ref 13–17)
HGB BLD-MCNC: 13.9 G/DL — SIGNIFICANT CHANGE UP (ref 13–17)
HGB BLD-MCNC: 14 G/DL — SIGNIFICANT CHANGE UP (ref 13–17)
HGB BLD-MCNC: 14.2 G/DL — SIGNIFICANT CHANGE UP (ref 13–17)
HGB BLD-MCNC: 14.3 G/DL — SIGNIFICANT CHANGE UP (ref 13–17)
HGB BLD-MCNC: 14.4 G/DL — SIGNIFICANT CHANGE UP (ref 13–17)
HGB BLD-MCNC: 14.5 G/DL — SIGNIFICANT CHANGE UP (ref 13–17)
HGB BLD-MCNC: 14.6 G/DL — SIGNIFICANT CHANGE UP (ref 13–17)
HGB BLD-MCNC: 15 G/DL — SIGNIFICANT CHANGE UP (ref 13–17)
HGB BLD-MCNC: 16.3 G/DL — SIGNIFICANT CHANGE UP (ref 13–17)
HGB BLD-MCNC: 9.7 G/DL — LOW (ref 13–17)
HMPV RNA SPEC QL NAA+PROBE: SIGNIFICANT CHANGE UP
HPIV1 RNA SPEC QL NAA+PROBE: SIGNIFICANT CHANGE UP
HPIV2 RNA SPEC QL NAA+PROBE: SIGNIFICANT CHANGE UP
HPIV3 RNA SPEC QL NAA+PROBE: SIGNIFICANT CHANGE UP
HPIV4 RNA SPEC QL NAA+PROBE: SIGNIFICANT CHANGE UP
HYALINE CASTS # UR AUTO: 3 /LPF — SIGNIFICANT CHANGE UP (ref 0–7)
HYALINE CASTS # UR AUTO: SIGNIFICANT CHANGE UP /LPF (ref 0–7)
IANC: 10.44 K/UL — HIGH (ref 1.5–8.5)
IANC: 11.88 K/UL — HIGH (ref 1.5–8.5)
IANC: 12.13 K/UL — HIGH (ref 1.5–8.5)
IANC: 18.76 K/UL — HIGH (ref 1.5–8.5)
IANC: 25.11 K/UL — HIGH (ref 1.5–8.5)
IANC: 6.88 K/UL — SIGNIFICANT CHANGE UP (ref 1.5–8.5)
IANC: 8.17 K/UL — SIGNIFICANT CHANGE UP (ref 1.5–8.5)
IANC: 9.69 K/UL — HIGH (ref 1.5–8.5)
IMM GRANULOCYTES NFR BLD AUTO: 0.6 % — SIGNIFICANT CHANGE UP (ref 0–1.5)
IMM GRANULOCYTES NFR BLD AUTO: 1 % — SIGNIFICANT CHANGE UP (ref 0–1.5)
IMM GRANULOCYTES NFR BLD AUTO: 1.1 % — SIGNIFICANT CHANGE UP (ref 0–1.5)
IMM GRANULOCYTES NFR BLD AUTO: 1.5 % — SIGNIFICANT CHANGE UP (ref 0–1.5)
INR BLD: 1.04 RATIO — SIGNIFICANT CHANGE UP (ref 0.88–1.16)
INR BLD: 1.13 RATIO — SIGNIFICANT CHANGE UP (ref 0.88–1.16)
INR BLD: 1.18 RATIO — HIGH (ref 0.88–1.16)
INR BLD: 1.19 RATIO — HIGH (ref 0.88–1.16)
INR BLD: 1.21 RATIO — HIGH (ref 0.88–1.16)
INR BLD: 1.22 RATIO — HIGH (ref 0.88–1.16)
INR BLD: 1.22 RATIO — HIGH (ref 0.88–1.16)
INR BLD: 1.32 RATIO — HIGH (ref 0.88–1.16)
INR BLD: 1.35 RATIO — HIGH (ref 0.88–1.16)
INR BLD: 1.36 RATIO — HIGH (ref 0.88–1.16)
INR BLD: 1.37 RATIO — HIGH (ref 0.88–1.16)
INR BLD: 1.4 RATIO — HIGH (ref 0.88–1.16)
INR BLD: 1.42 RATIO — HIGH (ref 0.88–1.16)
INR BLD: 1.43 RATIO — HIGH (ref 0.88–1.16)
INR BLD: 1.5 RATIO — HIGH (ref 0.88–1.16)
INR BLD: 1.51 RATIO — HIGH (ref 0.88–1.16)
INR BLD: 1.82 RATIO — HIGH (ref 0.88–1.16)
KETONES UR-MCNC: ABNORMAL
KETONES UR-MCNC: NEGATIVE — SIGNIFICANT CHANGE UP
LACTATE BLDV-MCNC: 2.2 MMOL/L — HIGH (ref 0.5–2)
LDH SERPL L TO P-CCNC: 171 U/L — SIGNIFICANT CHANGE UP (ref 135–225)
LDH SERPL L TO P-CCNC: 241 U/L — HIGH (ref 135–225)
LDH SERPL L TO P-CCNC: 378 U/L — HIGH (ref 135–225)
LDH SERPL L TO P-CCNC: 461 U/L — HIGH (ref 135–225)
LDH SERPL L TO P-CCNC: 537 U/L — HIGH (ref 135–225)
LDH SERPL L TO P-CCNC: 544 U/L — HIGH (ref 135–225)
LDH SERPL L TO P-CCNC: 637 U/L — HIGH (ref 135–225)
LDH SERPL L TO P-CCNC: 643 U/L — HIGH (ref 135–225)
LDH SERPL L TO P-CCNC: 737 U/L — HIGH (ref 135–225)
LEUKOCYTE ESTERASE UR-ACNC: NEGATIVE — SIGNIFICANT CHANGE UP
LEUKOCYTE ESTERASE UR-ACNC: NEGATIVE — SIGNIFICANT CHANGE UP
LIPID PNL WITH DIRECT LDL SERPL: 41 MG/DL — SIGNIFICANT CHANGE UP
LYMPHOCYTES # BLD AUTO: 0.22 K/UL — LOW (ref 1–3.3)
LYMPHOCYTES # BLD AUTO: 0.24 K/UL — LOW (ref 1–3.3)
LYMPHOCYTES # BLD AUTO: 0.35 K/UL — LOW (ref 1–3.3)
LYMPHOCYTES # BLD AUTO: 0.47 K/UL — LOW (ref 1–3.3)
LYMPHOCYTES # BLD AUTO: 0.59 K/UL — LOW (ref 1–3.3)
LYMPHOCYTES # BLD AUTO: 0.64 K/UL — LOW (ref 1–3.3)
LYMPHOCYTES # BLD AUTO: 0.67 K/UL — LOW (ref 1–3.3)
LYMPHOCYTES # BLD AUTO: 0.74 K/UL — LOW (ref 1–3.3)
LYMPHOCYTES # BLD AUTO: 0.9 % — LOW (ref 13–44)
LYMPHOCYTES # BLD AUTO: 1.7 % — LOW (ref 13–44)
LYMPHOCYTES # BLD AUTO: 3.5 % — LOW (ref 13–44)
LYMPHOCYTES # BLD AUTO: 3.7 % — LOW (ref 13–44)
LYMPHOCYTES # BLD AUTO: 4.4 % — LOW (ref 13–44)
LYMPHOCYTES # BLD AUTO: 5.5 % — LOW (ref 13–44)
LYMPHOCYTES # BLD AUTO: 5.8 % — LOW (ref 13–44)
LYMPHOCYTES # BLD AUTO: 6.9 % — LOW (ref 13–44)
MACROCYTES BLD QL: SLIGHT — SIGNIFICANT CHANGE UP
MACROCYTES BLD QL: SLIGHT — SIGNIFICANT CHANGE UP
MAGNESIUM SERPL-MCNC: 2.2 MG/DL — SIGNIFICANT CHANGE UP (ref 1.6–2.6)
MAGNESIUM SERPL-MCNC: 2.3 MG/DL — SIGNIFICANT CHANGE UP (ref 1.6–2.6)
MAGNESIUM SERPL-MCNC: 2.4 MG/DL — SIGNIFICANT CHANGE UP (ref 1.6–2.6)
MAGNESIUM SERPL-MCNC: 2.5 MG/DL — SIGNIFICANT CHANGE UP (ref 1.6–2.6)
MAGNESIUM SERPL-MCNC: 2.7 MG/DL — HIGH (ref 1.6–2.6)
MAGNESIUM SERPL-MCNC: 2.9 MG/DL — HIGH (ref 1.6–2.6)
MANUAL SMEAR VERIFICATION: SIGNIFICANT CHANGE UP
MCHC RBC-ENTMCNC: 30.2 PG — SIGNIFICANT CHANGE UP (ref 27–34)
MCHC RBC-ENTMCNC: 30.5 PG — SIGNIFICANT CHANGE UP (ref 27–34)
MCHC RBC-ENTMCNC: 30.6 PG — SIGNIFICANT CHANGE UP (ref 27–34)
MCHC RBC-ENTMCNC: 30.7 PG — SIGNIFICANT CHANGE UP (ref 27–34)
MCHC RBC-ENTMCNC: 30.7 PG — SIGNIFICANT CHANGE UP (ref 27–34)
MCHC RBC-ENTMCNC: 30.8 PG — SIGNIFICANT CHANGE UP (ref 27–34)
MCHC RBC-ENTMCNC: 30.8 PG — SIGNIFICANT CHANGE UP (ref 27–34)
MCHC RBC-ENTMCNC: 30.9 PG — SIGNIFICANT CHANGE UP (ref 27–34)
MCHC RBC-ENTMCNC: 30.9 PG — SIGNIFICANT CHANGE UP (ref 27–34)
MCHC RBC-ENTMCNC: 31 PG — SIGNIFICANT CHANGE UP (ref 27–34)
MCHC RBC-ENTMCNC: 31 PG — SIGNIFICANT CHANGE UP (ref 27–34)
MCHC RBC-ENTMCNC: 31.1 PG — SIGNIFICANT CHANGE UP (ref 27–34)
MCHC RBC-ENTMCNC: 31.1 PG — SIGNIFICANT CHANGE UP (ref 27–34)
MCHC RBC-ENTMCNC: 31.2 PG — SIGNIFICANT CHANGE UP (ref 27–34)
MCHC RBC-ENTMCNC: 31.3 PG — SIGNIFICANT CHANGE UP (ref 27–34)
MCHC RBC-ENTMCNC: 31.4 GM/DL — LOW (ref 32–36)
MCHC RBC-ENTMCNC: 31.4 PG — SIGNIFICANT CHANGE UP (ref 27–34)
MCHC RBC-ENTMCNC: 31.7 PG — SIGNIFICANT CHANGE UP (ref 27–34)
MCHC RBC-ENTMCNC: 32.4 GM/DL — SIGNIFICANT CHANGE UP (ref 32–36)
MCHC RBC-ENTMCNC: 32.6 GM/DL — SIGNIFICANT CHANGE UP (ref 32–36)
MCHC RBC-ENTMCNC: 32.8 GM/DL — SIGNIFICANT CHANGE UP (ref 32–36)
MCHC RBC-ENTMCNC: 32.8 GM/DL — SIGNIFICANT CHANGE UP (ref 32–36)
MCHC RBC-ENTMCNC: 32.9 GM/DL — SIGNIFICANT CHANGE UP (ref 32–36)
MCHC RBC-ENTMCNC: 33.1 GM/DL — SIGNIFICANT CHANGE UP (ref 32–36)
MCHC RBC-ENTMCNC: 33.4 GM/DL — SIGNIFICANT CHANGE UP (ref 32–36)
MCHC RBC-ENTMCNC: 33.7 GM/DL — SIGNIFICANT CHANGE UP (ref 32–36)
MCHC RBC-ENTMCNC: 33.8 GM/DL — SIGNIFICANT CHANGE UP (ref 32–36)
MCHC RBC-ENTMCNC: 33.9 GM/DL — SIGNIFICANT CHANGE UP (ref 32–36)
MCHC RBC-ENTMCNC: 34 GM/DL — SIGNIFICANT CHANGE UP (ref 32–36)
MCHC RBC-ENTMCNC: 34.1 GM/DL — SIGNIFICANT CHANGE UP (ref 32–36)
MCHC RBC-ENTMCNC: 34.4 GM/DL — SIGNIFICANT CHANGE UP (ref 32–36)
MCHC RBC-ENTMCNC: 34.5 GM/DL — SIGNIFICANT CHANGE UP (ref 32–36)
MCHC RBC-ENTMCNC: 34.6 GM/DL — SIGNIFICANT CHANGE UP (ref 32–36)
MCHC RBC-ENTMCNC: 34.6 GM/DL — SIGNIFICANT CHANGE UP (ref 32–36)
MCHC RBC-ENTMCNC: 35 GM/DL — SIGNIFICANT CHANGE UP (ref 32–36)
MCV RBC AUTO: 88.1 FL — SIGNIFICANT CHANGE UP (ref 80–100)
MCV RBC AUTO: 89.4 FL — SIGNIFICANT CHANGE UP (ref 80–100)
MCV RBC AUTO: 89.5 FL — SIGNIFICANT CHANGE UP (ref 80–100)
MCV RBC AUTO: 89.6 FL — SIGNIFICANT CHANGE UP (ref 80–100)
MCV RBC AUTO: 89.8 FL — SIGNIFICANT CHANGE UP (ref 80–100)
MCV RBC AUTO: 90.2 FL — SIGNIFICANT CHANGE UP (ref 80–100)
MCV RBC AUTO: 90.3 FL — SIGNIFICANT CHANGE UP (ref 80–100)
MCV RBC AUTO: 90.4 FL — SIGNIFICANT CHANGE UP (ref 80–100)
MCV RBC AUTO: 90.7 FL — SIGNIFICANT CHANGE UP (ref 80–100)
MCV RBC AUTO: 90.9 FL — SIGNIFICANT CHANGE UP (ref 80–100)
MCV RBC AUTO: 90.9 FL — SIGNIFICANT CHANGE UP (ref 80–100)
MCV RBC AUTO: 91.1 FL — SIGNIFICANT CHANGE UP (ref 80–100)
MCV RBC AUTO: 91.6 FL — SIGNIFICANT CHANGE UP (ref 80–100)
MCV RBC AUTO: 92.6 FL — SIGNIFICANT CHANGE UP (ref 80–100)
MCV RBC AUTO: 93.2 FL — SIGNIFICANT CHANGE UP (ref 80–100)
MCV RBC AUTO: 93.6 FL — SIGNIFICANT CHANGE UP (ref 80–100)
MCV RBC AUTO: 94.6 FL — SIGNIFICANT CHANGE UP (ref 80–100)
MCV RBC AUTO: 95.1 FL — SIGNIFICANT CHANGE UP (ref 80–100)
MCV RBC AUTO: 95.8 FL — SIGNIFICANT CHANGE UP (ref 80–100)
MCV RBC AUTO: 97.5 FL — SIGNIFICANT CHANGE UP (ref 80–100)
METAMYELOCYTES # FLD: 0.9 % — SIGNIFICANT CHANGE UP (ref 0–1)
METHOD TYPE: SIGNIFICANT CHANGE UP
METHOD TYPE: SIGNIFICANT CHANGE UP
MONOCYTES # BLD AUTO: 0 K/UL — SIGNIFICANT CHANGE UP (ref 0–0.9)
MONOCYTES # BLD AUTO: 0.2 K/UL — SIGNIFICANT CHANGE UP (ref 0–0.9)
MONOCYTES # BLD AUTO: 0.22 K/UL — SIGNIFICANT CHANGE UP (ref 0–0.9)
MONOCYTES # BLD AUTO: 0.22 K/UL — SIGNIFICANT CHANGE UP (ref 0–0.9)
MONOCYTES # BLD AUTO: 0.25 K/UL — SIGNIFICANT CHANGE UP (ref 0–0.9)
MONOCYTES # BLD AUTO: 0.37 K/UL — SIGNIFICANT CHANGE UP (ref 0–0.9)
MONOCYTES # BLD AUTO: 0.4 K/UL — SIGNIFICANT CHANGE UP (ref 0–0.9)
MONOCYTES # BLD AUTO: 0.47 K/UL — SIGNIFICANT CHANGE UP (ref 0–0.9)
MONOCYTES NFR BLD AUTO: 0 % — LOW (ref 2–14)
MONOCYTES NFR BLD AUTO: 1.1 % — LOW (ref 2–14)
MONOCYTES NFR BLD AUTO: 1.7 % — LOW (ref 2–14)
MONOCYTES NFR BLD AUTO: 2.2 % — SIGNIFICANT CHANGE UP (ref 2–14)
MONOCYTES NFR BLD AUTO: 2.6 % — SIGNIFICANT CHANGE UP (ref 2–14)
MONOCYTES NFR BLD AUTO: 3.5 % — SIGNIFICANT CHANGE UP (ref 2–14)
MONOCYTES NFR BLD AUTO: 3.7 % — SIGNIFICANT CHANGE UP (ref 2–14)
MONOCYTES NFR BLD AUTO: 4 % — SIGNIFICANT CHANGE UP (ref 2–14)
MRSA PCR RESULT.: SIGNIFICANT CHANGE UP
NEUTROPHILS # BLD AUTO: 10.44 K/UL — HIGH (ref 1.8–7.4)
NEUTROPHILS # BLD AUTO: 12.13 K/UL — HIGH (ref 1.8–7.4)
NEUTROPHILS # BLD AUTO: 12.27 K/UL — HIGH (ref 1.8–7.4)
NEUTROPHILS # BLD AUTO: 18.76 K/UL — HIGH (ref 1.8–7.4)
NEUTROPHILS # BLD AUTO: 26.33 K/UL — HIGH (ref 1.8–7.4)
NEUTROPHILS # BLD AUTO: 7.33 K/UL — SIGNIFICANT CHANGE UP (ref 1.8–7.4)
NEUTROPHILS # BLD AUTO: 8.17 K/UL — HIGH (ref 1.8–7.4)
NEUTROPHILS # BLD AUTO: 9.69 K/UL — HIGH (ref 1.8–7.4)
NEUTROPHILS NFR BLD AUTO: 83.2 % — HIGH (ref 43–77)
NEUTROPHILS NFR BLD AUTO: 87.9 % — HIGH (ref 43–77)
NEUTROPHILS NFR BLD AUTO: 89.5 % — HIGH (ref 43–77)
NEUTROPHILS NFR BLD AUTO: 90.3 % — HIGH (ref 43–77)
NEUTROPHILS NFR BLD AUTO: 90.4 % — HIGH (ref 43–77)
NEUTROPHILS NFR BLD AUTO: 92.2 % — HIGH (ref 43–77)
NEUTROPHILS NFR BLD AUTO: 92.9 % — HIGH (ref 43–77)
NEUTROPHILS NFR BLD AUTO: 99.1 % — HIGH (ref 43–77)
NEUTS BAND # BLD: 2.6 % — SIGNIFICANT CHANGE UP (ref 0–6)
NEUTS BAND # BLD: 2.6 % — SIGNIFICANT CHANGE UP (ref 0–6)
NEUTS BAND # BLD: 8 % — HIGH (ref 0–6)
NITRITE UR-MCNC: NEGATIVE — SIGNIFICANT CHANGE UP
NITRITE UR-MCNC: NEGATIVE — SIGNIFICANT CHANGE UP
NON HDL CHOLESTEROL: 70 MG/DL — SIGNIFICANT CHANGE UP
NRBC # BLD: 0 /100 WBCS — SIGNIFICANT CHANGE UP
NRBC # FLD: 0 K/UL — SIGNIFICANT CHANGE UP
NRBC # FLD: 0.02 K/UL — HIGH
NT-PROBNP SERPL-SCNC: 1314 PG/ML — HIGH
ORGANISM # SPEC MICROSCOPIC CNT: SIGNIFICANT CHANGE UP
OVALOCYTES BLD QL SMEAR: SLIGHT — SIGNIFICANT CHANGE UP
PCO2 BLDA: 29 MMHG — LOW (ref 35–48)
PCO2 BLDA: 38 MMHG — SIGNIFICANT CHANGE UP (ref 35–48)
PCO2 BLDV: 35 MMHG — LOW (ref 42–55)
PH BLDA: 7.42 — SIGNIFICANT CHANGE UP (ref 7.35–7.45)
PH BLDA: 7.49 — HIGH (ref 7.35–7.45)
PH BLDV: 7.41 — SIGNIFICANT CHANGE UP (ref 7.32–7.43)
PH UR: 5.5 — SIGNIFICANT CHANGE UP (ref 5–8)
PH UR: 6 — SIGNIFICANT CHANGE UP (ref 5–8)
PHOSPHATE SERPL-MCNC: 2.1 MG/DL — LOW (ref 2.5–4.5)
PHOSPHATE SERPL-MCNC: 2.4 MG/DL — LOW (ref 2.5–4.5)
PHOSPHATE SERPL-MCNC: 2.6 MG/DL — SIGNIFICANT CHANGE UP (ref 2.5–4.5)
PHOSPHATE SERPL-MCNC: 2.6 MG/DL — SIGNIFICANT CHANGE UP (ref 2.5–4.5)
PHOSPHATE SERPL-MCNC: 3 MG/DL — SIGNIFICANT CHANGE UP (ref 2.5–4.5)
PHOSPHATE SERPL-MCNC: 3.1 MG/DL — SIGNIFICANT CHANGE UP (ref 2.5–4.5)
PHOSPHATE SERPL-MCNC: 3.4 MG/DL — SIGNIFICANT CHANGE UP (ref 2.5–4.5)
PHOSPHATE SERPL-MCNC: 3.6 MG/DL — SIGNIFICANT CHANGE UP (ref 2.5–4.5)
PHOSPHATE SERPL-MCNC: 3.7 MG/DL — SIGNIFICANT CHANGE UP (ref 2.5–4.5)
PHOSPHATE SERPL-MCNC: 3.8 MG/DL — SIGNIFICANT CHANGE UP (ref 2.5–4.5)
PHOSPHATE SERPL-MCNC: 4.3 MG/DL — SIGNIFICANT CHANGE UP (ref 2.5–4.5)
PHOSPHATE SERPL-MCNC: 4.4 MG/DL — SIGNIFICANT CHANGE UP (ref 2.5–4.5)
PHOSPHATE SERPL-MCNC: 4.7 MG/DL — HIGH (ref 2.5–4.5)
PLAT MORPH BLD: ABNORMAL
PLAT MORPH BLD: ABNORMAL
PLAT MORPH BLD: NORMAL — SIGNIFICANT CHANGE UP
PLAT MORPH BLD: NORMAL — SIGNIFICANT CHANGE UP
PLATELET # BLD AUTO: 100 K/UL — LOW (ref 150–400)
PLATELET # BLD AUTO: 100 K/UL — LOW (ref 150–400)
PLATELET # BLD AUTO: 113 K/UL — LOW (ref 150–400)
PLATELET # BLD AUTO: 114 K/UL — LOW (ref 150–400)
PLATELET # BLD AUTO: 118 K/UL — LOW (ref 150–400)
PLATELET # BLD AUTO: 123 K/UL — LOW (ref 150–400)
PLATELET # BLD AUTO: 146 K/UL — LOW (ref 150–400)
PLATELET # BLD AUTO: 155 K/UL — SIGNIFICANT CHANGE UP (ref 150–400)
PLATELET # BLD AUTO: 161 K/UL — SIGNIFICANT CHANGE UP (ref 150–400)
PLATELET # BLD AUTO: 165 K/UL — SIGNIFICANT CHANGE UP (ref 150–400)
PLATELET # BLD AUTO: 174 K/UL — SIGNIFICANT CHANGE UP (ref 150–400)
PLATELET # BLD AUTO: 175 K/UL — SIGNIFICANT CHANGE UP (ref 150–400)
PLATELET # BLD AUTO: 179 K/UL — SIGNIFICANT CHANGE UP (ref 150–400)
PLATELET # BLD AUTO: 185 K/UL — SIGNIFICANT CHANGE UP (ref 150–400)
PLATELET # BLD AUTO: 187 K/UL — SIGNIFICANT CHANGE UP (ref 150–400)
PLATELET # BLD AUTO: 188 K/UL — SIGNIFICANT CHANGE UP (ref 150–400)
PLATELET # BLD AUTO: 196 K/UL — SIGNIFICANT CHANGE UP (ref 150–400)
PLATELET # BLD AUTO: 209 K/UL — SIGNIFICANT CHANGE UP (ref 150–400)
PLATELET # BLD AUTO: 223 K/UL — SIGNIFICANT CHANGE UP (ref 150–400)
PLATELET # BLD AUTO: 234 K/UL — SIGNIFICANT CHANGE UP (ref 150–400)
PLATELET # BLD AUTO: 255 K/UL — SIGNIFICANT CHANGE UP (ref 150–400)
PLATELET # BLD AUTO: 290 K/UL — SIGNIFICANT CHANGE UP (ref 150–400)
PLATELET COUNT - ESTIMATE: ABNORMAL
PLATELET COUNT - ESTIMATE: ABNORMAL
PLATELET COUNT - ESTIMATE: NORMAL — SIGNIFICANT CHANGE UP
PLATELET COUNT - ESTIMATE: NORMAL — SIGNIFICANT CHANGE UP
PO2 BLDA: 25 MMHG — CRITICAL LOW (ref 83–108)
PO2 BLDA: 59 MMHG — LOW (ref 83–108)
PO2 BLDV: 78 MMHG — HIGH (ref 35–40)
POIKILOCYTOSIS BLD QL AUTO: SIGNIFICANT CHANGE UP
POIKILOCYTOSIS BLD QL AUTO: SLIGHT — SIGNIFICANT CHANGE UP
POIKILOCYTOSIS BLD QL AUTO: SLIGHT — SIGNIFICANT CHANGE UP
POLYCHROMASIA BLD QL SMEAR: SIGNIFICANT CHANGE UP
POLYCHROMASIA BLD QL SMEAR: SLIGHT — SIGNIFICANT CHANGE UP
POLYCHROMASIA BLD QL SMEAR: SLIGHT — SIGNIFICANT CHANGE UP
POTASSIUM BLDV-SCNC: 4.2 MMOL/L — SIGNIFICANT CHANGE UP (ref 3.4–4.5)
POTASSIUM SERPL-MCNC: 4.1 MMOL/L — SIGNIFICANT CHANGE UP (ref 3.5–5.3)
POTASSIUM SERPL-MCNC: 4.2 MMOL/L — SIGNIFICANT CHANGE UP (ref 3.5–5.3)
POTASSIUM SERPL-MCNC: 4.3 MMOL/L — SIGNIFICANT CHANGE UP (ref 3.5–5.3)
POTASSIUM SERPL-MCNC: 4.3 MMOL/L — SIGNIFICANT CHANGE UP (ref 3.5–5.3)
POTASSIUM SERPL-MCNC: 4.4 MMOL/L — SIGNIFICANT CHANGE UP (ref 3.5–5.3)
POTASSIUM SERPL-MCNC: 4.5 MMOL/L — SIGNIFICANT CHANGE UP (ref 3.5–5.3)
POTASSIUM SERPL-MCNC: 4.6 MMOL/L — SIGNIFICANT CHANGE UP (ref 3.5–5.3)
POTASSIUM SERPL-MCNC: 4.7 MMOL/L — SIGNIFICANT CHANGE UP (ref 3.5–5.3)
POTASSIUM SERPL-MCNC: 4.7 MMOL/L — SIGNIFICANT CHANGE UP (ref 3.5–5.3)
POTASSIUM SERPL-MCNC: 4.8 MMOL/L — SIGNIFICANT CHANGE UP (ref 3.5–5.3)
POTASSIUM SERPL-MCNC: 4.9 MMOL/L — SIGNIFICANT CHANGE UP (ref 3.5–5.3)
POTASSIUM SERPL-MCNC: 4.9 MMOL/L — SIGNIFICANT CHANGE UP (ref 3.5–5.3)
POTASSIUM SERPL-SCNC: 4.1 MMOL/L — SIGNIFICANT CHANGE UP (ref 3.5–5.3)
POTASSIUM SERPL-SCNC: 4.2 MMOL/L — SIGNIFICANT CHANGE UP (ref 3.5–5.3)
POTASSIUM SERPL-SCNC: 4.3 MMOL/L — SIGNIFICANT CHANGE UP (ref 3.5–5.3)
POTASSIUM SERPL-SCNC: 4.3 MMOL/L — SIGNIFICANT CHANGE UP (ref 3.5–5.3)
POTASSIUM SERPL-SCNC: 4.4 MMOL/L — SIGNIFICANT CHANGE UP (ref 3.5–5.3)
POTASSIUM SERPL-SCNC: 4.5 MMOL/L — SIGNIFICANT CHANGE UP (ref 3.5–5.3)
POTASSIUM SERPL-SCNC: 4.6 MMOL/L — SIGNIFICANT CHANGE UP (ref 3.5–5.3)
POTASSIUM SERPL-SCNC: 4.7 MMOL/L — SIGNIFICANT CHANGE UP (ref 3.5–5.3)
POTASSIUM SERPL-SCNC: 4.7 MMOL/L — SIGNIFICANT CHANGE UP (ref 3.5–5.3)
POTASSIUM SERPL-SCNC: 4.8 MMOL/L — SIGNIFICANT CHANGE UP (ref 3.5–5.3)
POTASSIUM SERPL-SCNC: 4.9 MMOL/L — SIGNIFICANT CHANGE UP (ref 3.5–5.3)
POTASSIUM SERPL-SCNC: 4.9 MMOL/L — SIGNIFICANT CHANGE UP (ref 3.5–5.3)
POTASSIUM UR-SCNC: 63.1 MMOL/L — SIGNIFICANT CHANGE UP
PROCALCITONIN SERPL-MCNC: 0.07 NG/ML — SIGNIFICANT CHANGE UP (ref 0.02–0.1)
PROCALCITONIN SERPL-MCNC: 0.1 NG/ML — SIGNIFICANT CHANGE UP (ref 0.02–0.1)
PROCALCITONIN SERPL-MCNC: 0.13 NG/ML — HIGH (ref 0.02–0.1)
PROCALCITONIN SERPL-MCNC: 0.2 NG/ML — HIGH (ref 0.02–0.1)
PROCALCITONIN SERPL-MCNC: 0.31 NG/ML — HIGH (ref 0.02–0.1)
PROCALCITONIN SERPL-MCNC: 0.52 NG/ML — HIGH (ref 0.02–0.1)
PROCALCITONIN SERPL-MCNC: 0.58 NG/ML — HIGH (ref 0.02–0.1)
PROCALCITONIN SERPL-MCNC: 0.59 NG/ML — HIGH (ref 0.02–0.1)
PROCALCITONIN SERPL-MCNC: 1.22 NG/ML — HIGH (ref 0.02–0.1)
PROCALCITONIN SERPL-MCNC: 1.55 NG/ML — HIGH (ref 0.02–0.1)
PROT SERPL-MCNC: 5.2 G/DL — LOW (ref 6–8.3)
PROT SERPL-MCNC: 5.3 G/DL — LOW (ref 6–8.3)
PROT SERPL-MCNC: 5.4 G/DL — LOW (ref 6–8.3)
PROT SERPL-MCNC: 5.5 G/DL — LOW (ref 6–8.3)
PROT SERPL-MCNC: 5.6 G/DL — LOW (ref 6–8.3)
PROT SERPL-MCNC: 5.7 G/DL — LOW (ref 6–8.3)
PROT SERPL-MCNC: 5.7 G/DL — LOW (ref 6–8.3)
PROT SERPL-MCNC: 5.9 G/DL — LOW (ref 6–8.3)
PROT SERPL-MCNC: 6 G/DL — SIGNIFICANT CHANGE UP (ref 6–8.3)
PROT SERPL-MCNC: 6.1 G/DL — SIGNIFICANT CHANGE UP (ref 6–8.3)
PROT SERPL-MCNC: 6.1 G/DL — SIGNIFICANT CHANGE UP (ref 6–8.3)
PROT SERPL-MCNC: 6.2 G/DL — SIGNIFICANT CHANGE UP (ref 6–8.3)
PROT SERPL-MCNC: 6.4 G/DL — SIGNIFICANT CHANGE UP (ref 6–8.3)
PROT SERPL-MCNC: 6.9 G/DL — SIGNIFICANT CHANGE UP (ref 6–8.3)
PROT SERPL-MCNC: 6.9 G/DL — SIGNIFICANT CHANGE UP (ref 6–8.3)
PROT SERPL-MCNC: 7 G/DL — SIGNIFICANT CHANGE UP (ref 6–8.3)
PROT SERPL-MCNC: 7.2 G/DL — SIGNIFICANT CHANGE UP (ref 6–8.3)
PROT UR-MCNC: ABNORMAL
PROT UR-MCNC: ABNORMAL
PROTHROM AB SERPL-ACNC: 11.8 SEC — SIGNIFICANT CHANGE UP (ref 10.6–13.6)
PROTHROM AB SERPL-ACNC: 12.9 SEC — SIGNIFICANT CHANGE UP (ref 10.6–13.6)
PROTHROM AB SERPL-ACNC: 13.4 SEC — SIGNIFICANT CHANGE UP (ref 10.6–13.6)
PROTHROM AB SERPL-ACNC: 13.5 SEC — SIGNIFICANT CHANGE UP (ref 10.6–13.6)
PROTHROM AB SERPL-ACNC: 13.8 SEC — HIGH (ref 10.6–13.6)
PROTHROM AB SERPL-ACNC: 13.8 SEC — HIGH (ref 10.6–13.6)
PROTHROM AB SERPL-ACNC: 13.9 SEC — HIGH (ref 10.6–13.6)
PROTHROM AB SERPL-ACNC: 14.8 SEC — HIGH (ref 10.6–13.6)
PROTHROM AB SERPL-ACNC: 15.3 SEC — HIGH (ref 10.6–13.6)
PROTHROM AB SERPL-ACNC: 15.3 SEC — HIGH (ref 10.6–13.6)
PROTHROM AB SERPL-ACNC: 15.5 SEC — HIGH (ref 10.6–13.6)
PROTHROM AB SERPL-ACNC: 15.8 SEC — HIGH (ref 10.6–13.6)
PROTHROM AB SERPL-ACNC: 15.9 SEC — HIGH (ref 10.6–13.6)
PROTHROM AB SERPL-ACNC: 16.2 SEC — HIGH (ref 10.6–13.6)
PROTHROM AB SERPL-ACNC: 16.9 SEC — HIGH (ref 10.6–13.6)
PROTHROM AB SERPL-ACNC: 16.9 SEC — HIGH (ref 10.6–13.6)
PROTHROM AB SERPL-ACNC: 20.3 SEC — HIGH (ref 10.6–13.6)
RAPID RVP RESULT: DETECTED
RBC # BLD: 3.17 M/UL — LOW (ref 4.2–5.8)
RBC # BLD: 3.22 M/UL — LOW (ref 4.2–5.8)
RBC # BLD: 3.51 M/UL — LOW (ref 4.2–5.8)
RBC # BLD: 3.56 M/UL — LOW (ref 4.2–5.8)
RBC # BLD: 3.61 M/UL — LOW (ref 4.2–5.8)
RBC # BLD: 3.88 M/UL — LOW (ref 4.2–5.8)
RBC # BLD: 4.24 M/UL — SIGNIFICANT CHANGE UP (ref 4.2–5.8)
RBC # BLD: 4.26 M/UL — SIGNIFICANT CHANGE UP (ref 4.2–5.8)
RBC # BLD: 4.29 M/UL — SIGNIFICANT CHANGE UP (ref 4.2–5.8)
RBC # BLD: 4.43 M/UL — SIGNIFICANT CHANGE UP (ref 4.2–5.8)
RBC # BLD: 4.44 M/UL — SIGNIFICANT CHANGE UP (ref 4.2–5.8)
RBC # BLD: 4.48 M/UL — SIGNIFICANT CHANGE UP (ref 4.2–5.8)
RBC # BLD: 4.53 M/UL — SIGNIFICANT CHANGE UP (ref 4.2–5.8)
RBC # BLD: 4.58 M/UL — SIGNIFICANT CHANGE UP (ref 4.2–5.8)
RBC # BLD: 4.62 M/UL — SIGNIFICANT CHANGE UP (ref 4.2–5.8)
RBC # BLD: 4.63 M/UL — SIGNIFICANT CHANGE UP (ref 4.2–5.8)
RBC # BLD: 4.69 M/UL — SIGNIFICANT CHANGE UP (ref 4.2–5.8)
RBC # BLD: 4.71 M/UL — SIGNIFICANT CHANGE UP (ref 4.2–5.8)
RBC # BLD: 4.73 M/UL — SIGNIFICANT CHANGE UP (ref 4.2–5.8)
RBC # BLD: 4.74 M/UL — SIGNIFICANT CHANGE UP (ref 4.2–5.8)
RBC # BLD: 4.9 M/UL — SIGNIFICANT CHANGE UP (ref 4.2–5.8)
RBC # BLD: 5.29 M/UL — SIGNIFICANT CHANGE UP (ref 4.2–5.8)
RBC # FLD: 12.3 % — SIGNIFICANT CHANGE UP (ref 10.3–14.5)
RBC # FLD: 12.4 % — SIGNIFICANT CHANGE UP (ref 10.3–14.5)
RBC # FLD: 12.4 % — SIGNIFICANT CHANGE UP (ref 10.3–14.5)
RBC # FLD: 12.6 % — SIGNIFICANT CHANGE UP (ref 10.3–14.5)
RBC # FLD: 12.6 % — SIGNIFICANT CHANGE UP (ref 10.3–14.5)
RBC # FLD: 12.7 % — SIGNIFICANT CHANGE UP (ref 10.3–14.5)
RBC # FLD: 12.8 % — SIGNIFICANT CHANGE UP (ref 10.3–14.5)
RBC # FLD: 12.8 % — SIGNIFICANT CHANGE UP (ref 10.3–14.5)
RBC # FLD: 12.9 % — SIGNIFICANT CHANGE UP (ref 10.3–14.5)
RBC # FLD: 12.9 % — SIGNIFICANT CHANGE UP (ref 10.3–14.5)
RBC # FLD: 13 % — SIGNIFICANT CHANGE UP (ref 10.3–14.5)
RBC # FLD: 13.2 % — SIGNIFICANT CHANGE UP (ref 10.3–14.5)
RBC # FLD: 13.3 % — SIGNIFICANT CHANGE UP (ref 10.3–14.5)
RBC # FLD: 13.8 % — SIGNIFICANT CHANGE UP (ref 10.3–14.5)
RBC # FLD: 13.8 % — SIGNIFICANT CHANGE UP (ref 10.3–14.5)
RBC # FLD: 14.5 % — SIGNIFICANT CHANGE UP (ref 10.3–14.5)
RBC BLD AUTO: ABNORMAL
RBC BLD AUTO: NORMAL — SIGNIFICANT CHANGE UP
RBC CASTS # UR COMP ASSIST: 3 /HPF — SIGNIFICANT CHANGE UP (ref 0–4)
RBC CASTS # UR COMP ASSIST: <5 /HPF — HIGH (ref 0–4)
RH IG SCN BLD-IMP: POSITIVE — SIGNIFICANT CHANGE UP
RSV RNA SPEC QL NAA+PROBE: SIGNIFICANT CHANGE UP
RV+EV RNA SPEC QL NAA+PROBE: SIGNIFICANT CHANGE UP
S AUREUS DNA NOSE QL NAA+PROBE: SIGNIFICANT CHANGE UP
SAO2 % BLDA: 38.1 % — LOW (ref 95–99)
SAO2 % BLDA: 89.7 % — LOW (ref 95–99)
SAO2 % BLDV: 93.6 % — HIGH (ref 60–85)
SARS-COV-2 IGG+IGM SERPL QL IA: >250 U/ML — HIGH
SARS-COV-2 IGG+IGM SERPL QL IA: POSITIVE
SARS-COV-2 RNA SPEC QL NAA+PROBE: DETECTED
SARS-COV-2 RNA SPEC QL NAA+PROBE: DETECTED
SMUDGE CELLS # BLD: PRESENT — SIGNIFICANT CHANGE UP
SODIUM SERPL-SCNC: 127 MMOL/L — LOW (ref 135–145)
SODIUM SERPL-SCNC: 128 MMOL/L — LOW (ref 135–145)
SODIUM SERPL-SCNC: 128 MMOL/L — LOW (ref 135–145)
SODIUM SERPL-SCNC: 129 MMOL/L — LOW (ref 135–145)
SODIUM SERPL-SCNC: 131 MMOL/L — LOW (ref 135–145)
SODIUM SERPL-SCNC: 132 MMOL/L — LOW (ref 135–145)
SODIUM SERPL-SCNC: 133 MMOL/L — LOW (ref 135–145)
SODIUM SERPL-SCNC: 134 MMOL/L — LOW (ref 135–145)
SODIUM SERPL-SCNC: 135 MMOL/L — SIGNIFICANT CHANGE UP (ref 135–145)
SODIUM SERPL-SCNC: 136 MMOL/L — SIGNIFICANT CHANGE UP (ref 135–145)
SODIUM SERPL-SCNC: 137 MMOL/L — SIGNIFICANT CHANGE UP (ref 135–145)
SODIUM UR-SCNC: <20 MMOL/L — SIGNIFICANT CHANGE UP
SP GR SPEC: 1.03 — HIGH (ref 1.01–1.02)
SP GR SPEC: 1.04 — HIGH (ref 1.01–1.02)
SPECIMEN SOURCE: SIGNIFICANT CHANGE UP
T4 FREE SERPL-MCNC: 1.4 NG/DL — SIGNIFICANT CHANGE UP (ref 0.9–1.8)
TRIGL SERPL-MCNC: 145 MG/DL — SIGNIFICANT CHANGE UP
TRIGL SERPL-MCNC: 172 MG/DL — HIGH
TRIGL SERPL-MCNC: 217 MG/DL — HIGH
TRIGL SERPL-MCNC: 262 MG/DL — HIGH
TROPONIN T, HIGH SENSITIVITY RESULT: 118 NG/L — CRITICAL HIGH
TROPONIN T, HIGH SENSITIVITY RESULT: 53 NG/L — CRITICAL HIGH
TSH SERPL-MCNC: 0.2 UIU/ML — LOW (ref 0.27–4.2)
UROBILINOGEN FLD QL: ABNORMAL
UROBILINOGEN FLD QL: ABNORMAL
UUN UR-MCNC: 583.6 MG/DL — SIGNIFICANT CHANGE UP
VANCOMYCIN TROUGH SERPL-MCNC: 7.6 UG/ML — LOW (ref 10–20)
WBC # BLD: 10.29 K/UL — SIGNIFICANT CHANGE UP (ref 3.8–10.5)
WBC # BLD: 10.32 K/UL — SIGNIFICANT CHANGE UP (ref 3.8–10.5)
WBC # BLD: 10.6 K/UL — HIGH (ref 3.8–10.5)
WBC # BLD: 10.82 K/UL — HIGH (ref 3.8–10.5)
WBC # BLD: 11.15 K/UL — HIGH (ref 3.8–10.5)
WBC # BLD: 11.19 K/UL — HIGH (ref 3.8–10.5)
WBC # BLD: 11.28 K/UL — HIGH (ref 3.8–10.5)
WBC # BLD: 11.48 K/UL — HIGH (ref 3.8–10.5)
WBC # BLD: 11.55 K/UL — HIGH (ref 3.8–10.5)
WBC # BLD: 12.14 K/UL — HIGH (ref 3.8–10.5)
WBC # BLD: 12.94 K/UL — HIGH (ref 3.8–10.5)
WBC # BLD: 13.3 K/UL — HIGH (ref 3.8–10.5)
WBC # BLD: 13.87 K/UL — HIGH (ref 3.8–10.5)
WBC # BLD: 20.18 K/UL — HIGH (ref 3.8–10.5)
WBC # BLD: 20.26 K/UL — HIGH (ref 3.8–10.5)
WBC # BLD: 26.57 K/UL — HIGH (ref 3.8–10.5)
WBC # BLD: 7.88 K/UL — SIGNIFICANT CHANGE UP (ref 3.8–10.5)
WBC # BLD: 8.16 K/UL — SIGNIFICANT CHANGE UP (ref 3.8–10.5)
WBC # BLD: 8.51 K/UL — SIGNIFICANT CHANGE UP (ref 3.8–10.5)
WBC # BLD: 8.97 K/UL — SIGNIFICANT CHANGE UP (ref 3.8–10.5)
WBC # BLD: 9.29 K/UL — SIGNIFICANT CHANGE UP (ref 3.8–10.5)
WBC # BLD: 9.5 K/UL — SIGNIFICANT CHANGE UP (ref 3.8–10.5)
WBC # FLD AUTO: 10.29 K/UL — SIGNIFICANT CHANGE UP (ref 3.8–10.5)
WBC # FLD AUTO: 10.32 K/UL — SIGNIFICANT CHANGE UP (ref 3.8–10.5)
WBC # FLD AUTO: 10.6 K/UL — HIGH (ref 3.8–10.5)
WBC # FLD AUTO: 10.82 K/UL — HIGH (ref 3.8–10.5)
WBC # FLD AUTO: 11.15 K/UL — HIGH (ref 3.8–10.5)
WBC # FLD AUTO: 11.19 K/UL — HIGH (ref 3.8–10.5)
WBC # FLD AUTO: 11.28 K/UL — HIGH (ref 3.8–10.5)
WBC # FLD AUTO: 11.48 K/UL — HIGH (ref 3.8–10.5)
WBC # FLD AUTO: 11.55 K/UL — HIGH (ref 3.8–10.5)
WBC # FLD AUTO: 12.14 K/UL — HIGH (ref 3.8–10.5)
WBC # FLD AUTO: 12.94 K/UL — HIGH (ref 3.8–10.5)
WBC # FLD AUTO: 13.3 K/UL — HIGH (ref 3.8–10.5)
WBC # FLD AUTO: 13.87 K/UL — HIGH (ref 3.8–10.5)
WBC # FLD AUTO: 20.18 K/UL — HIGH (ref 3.8–10.5)
WBC # FLD AUTO: 20.26 K/UL — HIGH (ref 3.8–10.5)
WBC # FLD AUTO: 26.57 K/UL — HIGH (ref 3.8–10.5)
WBC # FLD AUTO: 7.88 K/UL — SIGNIFICANT CHANGE UP (ref 3.8–10.5)
WBC # FLD AUTO: 8.16 K/UL — SIGNIFICANT CHANGE UP (ref 3.8–10.5)
WBC # FLD AUTO: 8.51 K/UL — SIGNIFICANT CHANGE UP (ref 3.8–10.5)
WBC # FLD AUTO: 8.97 K/UL — SIGNIFICANT CHANGE UP (ref 3.8–10.5)
WBC # FLD AUTO: 9.29 K/UL — SIGNIFICANT CHANGE UP (ref 3.8–10.5)
WBC # FLD AUTO: 9.5 K/UL — SIGNIFICANT CHANGE UP (ref 3.8–10.5)
WBC UR QL: 4 /HPF — SIGNIFICANT CHANGE UP (ref 0–5)
WBC UR QL: <5 /HPF — SIGNIFICANT CHANGE UP (ref 0–5)

## 2021-01-01 PROCEDURE — 99233 SBSQ HOSP IP/OBS HIGH 50: CPT | Mod: CS

## 2021-01-01 PROCEDURE — 71045 X-RAY EXAM CHEST 1 VIEW: CPT | Mod: 26

## 2021-01-01 PROCEDURE — 99223 1ST HOSP IP/OBS HIGH 75: CPT

## 2021-01-01 PROCEDURE — 99291 CRITICAL CARE FIRST HOUR: CPT | Mod: CS

## 2021-01-01 PROCEDURE — 99232 SBSQ HOSP IP/OBS MODERATE 35: CPT | Mod: CS

## 2021-01-01 PROCEDURE — 99223 1ST HOSP IP/OBS HIGH 75: CPT | Mod: CS

## 2021-01-01 PROCEDURE — 99291 CRITICAL CARE FIRST HOUR: CPT

## 2021-01-01 PROCEDURE — 99232 SBSQ HOSP IP/OBS MODERATE 35: CPT

## 2021-01-01 PROCEDURE — 93010 ELECTROCARDIOGRAM REPORT: CPT

## 2021-01-01 PROCEDURE — 99291 CRITICAL CARE FIRST HOUR: CPT | Mod: CS,25

## 2021-01-01 PROCEDURE — 12345: CPT | Mod: NC

## 2021-01-01 PROCEDURE — 93306 TTE W/DOPPLER COMPLETE: CPT | Mod: 26

## 2021-01-01 PROCEDURE — 71045 X-RAY EXAM CHEST 1 VIEW: CPT | Mod: 26,77

## 2021-01-01 RX ORDER — ASPIRIN/CALCIUM CARB/MAGNESIUM 324 MG
81 TABLET ORAL DAILY
Refills: 0 | Status: DISCONTINUED | OUTPATIENT
Start: 2021-01-01 | End: 2021-01-01

## 2021-01-01 RX ORDER — MORPHINE SULFATE 50 MG/1
2 CAPSULE, EXTENDED RELEASE ORAL
Refills: 0 | Status: DISCONTINUED | OUTPATIENT
Start: 2021-01-01 | End: 2021-01-01

## 2021-01-01 RX ORDER — DEXTROSE 50 % IN WATER 50 %
25 SYRINGE (ML) INTRAVENOUS ONCE
Refills: 0 | Status: DISCONTINUED | OUTPATIENT
Start: 2021-01-01 | End: 2021-01-01

## 2021-01-01 RX ORDER — INSULIN LISPRO 100/ML
VIAL (ML) SUBCUTANEOUS
Refills: 0 | Status: DISCONTINUED | OUTPATIENT
Start: 2021-01-01 | End: 2021-01-01

## 2021-01-01 RX ORDER — DEXTROSE 50 % IN WATER 50 %
15 SYRINGE (ML) INTRAVENOUS ONCE
Refills: 0 | Status: DISCONTINUED | OUTPATIENT
Start: 2021-01-01 | End: 2021-01-01

## 2021-01-01 RX ORDER — CHLORHEXIDINE GLUCONATE 213 G/1000ML
15 SOLUTION TOPICAL EVERY 12 HOURS
Refills: 0 | Status: DISCONTINUED | OUTPATIENT
Start: 2021-01-01 | End: 2021-01-01

## 2021-01-01 RX ORDER — CLOPIDOGREL BISULFATE 75 MG/1
75 TABLET, FILM COATED ORAL DAILY
Refills: 0 | Status: DISCONTINUED | OUTPATIENT
Start: 2021-01-01 | End: 2021-01-01

## 2021-01-01 RX ORDER — ATORVASTATIN CALCIUM 80 MG/1
40 TABLET, FILM COATED ORAL AT BEDTIME
Refills: 0 | Status: DISCONTINUED | OUTPATIENT
Start: 2021-01-01 | End: 2021-01-01

## 2021-01-01 RX ORDER — GLUCAGON INJECTION, SOLUTION 0.5 MG/.1ML
1 INJECTION, SOLUTION SUBCUTANEOUS ONCE
Refills: 0 | Status: DISCONTINUED | OUTPATIENT
Start: 2021-01-01 | End: 2021-01-01

## 2021-01-01 RX ORDER — DEXTROSE 50 % IN WATER 50 %
12.5 SYRINGE (ML) INTRAVENOUS ONCE
Refills: 0 | Status: DISCONTINUED | OUTPATIENT
Start: 2021-01-01 | End: 2021-01-01

## 2021-01-01 RX ORDER — SODIUM CHLORIDE 9 MG/ML
1000 INJECTION, SOLUTION INTRAVENOUS
Refills: 0 | Status: DISCONTINUED | OUTPATIENT
Start: 2021-01-01 | End: 2021-01-01

## 2021-01-01 RX ORDER — ASPIRIN/CALCIUM CARB/MAGNESIUM 324 MG
1 TABLET ORAL
Qty: 0 | Refills: 0 | DISCHARGE

## 2021-01-01 RX ORDER — SODIUM CHLORIDE 9 MG/ML
500 INJECTION, SOLUTION INTRAVENOUS ONCE
Refills: 0 | Status: COMPLETED | OUTPATIENT
Start: 2021-01-01 | End: 2021-01-01

## 2021-01-01 RX ORDER — ALBUMIN HUMAN 25 %
50 VIAL (ML) INTRAVENOUS EVERY 12 HOURS
Refills: 0 | Status: DISCONTINUED | OUTPATIENT
Start: 2021-01-01 | End: 2021-01-01

## 2021-01-01 RX ORDER — ALBUMIN HUMAN 25 %
500 VIAL (ML) INTRAVENOUS ONCE
Refills: 0 | Status: COMPLETED | OUTPATIENT
Start: 2021-01-01 | End: 2021-01-01

## 2021-01-01 RX ORDER — EMPAGLIFLOZIN 10 MG/1
1 TABLET, FILM COATED ORAL
Qty: 0 | Refills: 0 | DISCHARGE

## 2021-01-01 RX ORDER — SODIUM CHLORIDE 9 MG/ML
1000 INJECTION INTRAMUSCULAR; INTRAVENOUS; SUBCUTANEOUS ONCE
Refills: 0 | Status: COMPLETED | OUTPATIENT
Start: 2021-01-01 | End: 2021-01-01

## 2021-01-01 RX ORDER — REMDESIVIR 5 MG/ML
100 INJECTION INTRAVENOUS EVERY 24 HOURS
Refills: 0 | Status: COMPLETED | OUTPATIENT
Start: 2021-01-01 | End: 2021-01-01

## 2021-01-01 RX ORDER — PANTOPRAZOLE SODIUM 20 MG/1
40 TABLET, DELAYED RELEASE ORAL DAILY
Refills: 0 | Status: DISCONTINUED | OUTPATIENT
Start: 2021-01-01 | End: 2021-01-01

## 2021-01-01 RX ORDER — HUMAN INSULIN 100 [IU]/ML
4 INJECTION, SUSPENSION SUBCUTANEOUS EVERY 6 HOURS
Refills: 0 | Status: DISCONTINUED | OUTPATIENT
Start: 2021-01-01 | End: 2021-01-01

## 2021-01-01 RX ORDER — ALBUMIN HUMAN 25 %
250 VIAL (ML) INTRAVENOUS ONCE
Refills: 0 | Status: COMPLETED | OUTPATIENT
Start: 2021-01-01 | End: 2021-01-01

## 2021-01-01 RX ORDER — HYDROCORTISONE 20 MG
50 TABLET ORAL EVERY 6 HOURS
Refills: 0 | Status: DISCONTINUED | OUTPATIENT
Start: 2021-01-01 | End: 2021-01-01

## 2021-01-01 RX ORDER — FENTANYL CITRATE 50 UG/ML
0.5 INJECTION INTRAVENOUS
Qty: 2500 | Refills: 0 | Status: DISCONTINUED | OUTPATIENT
Start: 2021-01-01 | End: 2021-01-01

## 2021-01-01 RX ORDER — HEPARIN SODIUM 5000 [USP'U]/ML
5500 INJECTION INTRAVENOUS; SUBCUTANEOUS EVERY 6 HOURS
Refills: 0 | Status: DISCONTINUED | OUTPATIENT
Start: 2021-01-01 | End: 2021-01-01

## 2021-01-01 RX ORDER — INSULIN GLARGINE 100 [IU]/ML
15 INJECTION, SOLUTION SUBCUTANEOUS AT BEDTIME
Refills: 0 | Status: DISCONTINUED | OUTPATIENT
Start: 2021-01-01 | End: 2021-01-01

## 2021-01-01 RX ORDER — VANCOMYCIN HCL 1 G
1000 VIAL (EA) INTRAVENOUS ONCE
Refills: 0 | Status: COMPLETED | OUTPATIENT
Start: 2021-01-01 | End: 2021-01-01

## 2021-01-01 RX ORDER — AMIODARONE HYDROCHLORIDE 400 MG/1
150 TABLET ORAL ONCE
Refills: 0 | Status: COMPLETED | OUTPATIENT
Start: 2021-01-01 | End: 2021-01-01

## 2021-01-01 RX ORDER — DIGOXIN 250 MCG
250 TABLET ORAL EVERY 6 HOURS
Refills: 0 | Status: DISCONTINUED | OUTPATIENT
Start: 2021-01-01 | End: 2021-01-01

## 2021-01-01 RX ORDER — ACETAMINOPHEN 500 MG
975 TABLET ORAL ONCE
Refills: 0 | Status: COMPLETED | OUTPATIENT
Start: 2021-01-01 | End: 2021-01-01

## 2021-01-01 RX ORDER — REMDESIVIR 5 MG/ML
200 INJECTION INTRAVENOUS EVERY 24 HOURS
Refills: 0 | Status: COMPLETED | OUTPATIENT
Start: 2021-01-01 | End: 2021-01-01

## 2021-01-01 RX ORDER — HUMAN INSULIN 100 [IU]/ML
7 INJECTION, SUSPENSION SUBCUTANEOUS EVERY 6 HOURS
Refills: 0 | Status: DISCONTINUED | OUTPATIENT
Start: 2021-01-01 | End: 2021-01-01

## 2021-01-01 RX ORDER — DIGOXIN 250 MCG
500 TABLET ORAL ONCE
Refills: 0 | Status: COMPLETED | OUTPATIENT
Start: 2021-01-01 | End: 2021-01-01

## 2021-01-01 RX ORDER — SENNA PLUS 8.6 MG/1
2 TABLET ORAL AT BEDTIME
Refills: 0 | Status: DISCONTINUED | OUTPATIENT
Start: 2021-01-01 | End: 2021-01-01

## 2021-01-01 RX ORDER — ENOXAPARIN SODIUM 100 MG/ML
40 INJECTION SUBCUTANEOUS DAILY
Refills: 0 | Status: DISCONTINUED | OUTPATIENT
Start: 2021-01-01 | End: 2021-01-01

## 2021-01-01 RX ORDER — DEXAMETHASONE 0.5 MG/5ML
6 ELIXIR ORAL ONCE
Refills: 0 | Status: COMPLETED | OUTPATIENT
Start: 2021-01-01 | End: 2021-01-01

## 2021-01-01 RX ORDER — POLYETHYLENE GLYCOL 3350 17 G/17G
17 POWDER, FOR SOLUTION ORAL DAILY
Refills: 0 | Status: DISCONTINUED | OUTPATIENT
Start: 2021-01-01 | End: 2021-01-01

## 2021-01-01 RX ORDER — SODIUM CHLORIDE 9 MG/ML
500 INJECTION INTRAMUSCULAR; INTRAVENOUS; SUBCUTANEOUS ONCE
Refills: 0 | Status: COMPLETED | OUTPATIENT
Start: 2021-01-01 | End: 2021-01-01

## 2021-01-01 RX ORDER — HEPARIN SODIUM 5000 [USP'U]/ML
INJECTION INTRAVENOUS; SUBCUTANEOUS
Qty: 25000 | Refills: 0 | Status: DISCONTINUED | OUTPATIENT
Start: 2021-01-01 | End: 2021-01-01

## 2021-01-01 RX ORDER — VANCOMYCIN HCL 1 G
1000 VIAL (EA) INTRAVENOUS EVERY 12 HOURS
Refills: 0 | Status: DISCONTINUED | OUTPATIENT
Start: 2021-01-01 | End: 2021-01-01

## 2021-01-01 RX ORDER — TAMSULOSIN HYDROCHLORIDE 0.4 MG/1
0.4 CAPSULE ORAL AT BEDTIME
Refills: 0 | Status: DISCONTINUED | OUTPATIENT
Start: 2021-01-01 | End: 2021-01-01

## 2021-01-01 RX ORDER — CLOPIDOGREL BISULFATE 75 MG/1
1 TABLET, FILM COATED ORAL
Qty: 0 | Refills: 0 | DISCHARGE

## 2021-01-01 RX ORDER — NOREPINEPHRINE BITARTRATE/D5W 8 MG/250ML
0.05 PLASTIC BAG, INJECTION (ML) INTRAVENOUS
Qty: 16 | Refills: 0 | Status: DISCONTINUED | OUTPATIENT
Start: 2021-01-01 | End: 2021-01-01

## 2021-01-01 RX ORDER — SODIUM CHLORIDE 0.65 %
1 AEROSOL, SPRAY (ML) NASAL EVERY 4 HOURS
Refills: 0 | Status: DISCONTINUED | OUTPATIENT
Start: 2021-01-01 | End: 2021-01-01

## 2021-01-01 RX ORDER — METFORMIN HYDROCHLORIDE 850 MG/1
1 TABLET ORAL
Qty: 0 | Refills: 0 | DISCHARGE

## 2021-01-01 RX ORDER — AMIODARONE HYDROCHLORIDE 400 MG/1
0.5 TABLET ORAL
Qty: 450 | Refills: 0 | Status: DISCONTINUED | OUTPATIENT
Start: 2021-01-01 | End: 2021-01-01

## 2021-01-01 RX ORDER — CISATRACURIUM BESYLATE 2 MG/ML
3 INJECTION INTRAVENOUS
Qty: 200 | Refills: 0 | Status: DISCONTINUED | OUTPATIENT
Start: 2021-01-01 | End: 2021-01-01

## 2021-01-01 RX ORDER — PROPOFOL 10 MG/ML
50.02 INJECTION, EMULSION INTRAVENOUS
Qty: 1000 | Refills: 0 | Status: DISCONTINUED | OUTPATIENT
Start: 2021-01-01 | End: 2021-01-01

## 2021-01-01 RX ORDER — OMEPRAZOLE 10 MG/1
1 CAPSULE, DELAYED RELEASE ORAL
Qty: 0 | Refills: 0 | DISCHARGE

## 2021-01-01 RX ORDER — ACETAMINOPHEN 500 MG
650 TABLET ORAL ONCE
Refills: 0 | Status: COMPLETED | OUTPATIENT
Start: 2021-01-01 | End: 2021-01-01

## 2021-01-01 RX ORDER — ENOXAPARIN SODIUM 100 MG/ML
70 INJECTION SUBCUTANEOUS EVERY 12 HOURS
Refills: 0 | Status: DISCONTINUED | OUTPATIENT
Start: 2021-01-01 | End: 2021-01-01

## 2021-01-01 RX ORDER — PROPOFOL 10 MG/ML
20 INJECTION, EMULSION INTRAVENOUS
Qty: 1000 | Refills: 0 | Status: DISCONTINUED | OUTPATIENT
Start: 2021-01-01 | End: 2021-01-01

## 2021-01-01 RX ORDER — ACETAMINOPHEN 500 MG
1000 TABLET ORAL ONCE
Refills: 0 | Status: DISCONTINUED | OUTPATIENT
Start: 2021-01-01 | End: 2021-01-01

## 2021-01-01 RX ORDER — CALCIUM GLUCONATE 100 MG/ML
1 VIAL (ML) INTRAVENOUS ONCE
Refills: 0 | Status: COMPLETED | OUTPATIENT
Start: 2021-01-01 | End: 2021-01-01

## 2021-01-01 RX ORDER — SODIUM BICARBONATE 1 MEQ/ML
50 SYRINGE (ML) INTRAVENOUS ONCE
Refills: 0 | Status: COMPLETED | OUTPATIENT
Start: 2021-01-01 | End: 2021-01-01

## 2021-01-01 RX ORDER — ROBINUL 0.2 MG/ML
0.2 INJECTION INTRAMUSCULAR; INTRAVENOUS ONCE
Refills: 0 | Status: COMPLETED | OUTPATIENT
Start: 2021-01-01 | End: 2021-01-01

## 2021-01-01 RX ORDER — ICOSAPENT ETHYL 500 MG/1
1 CAPSULE, LIQUID FILLED ORAL
Qty: 0 | Refills: 0 | DISCHARGE

## 2021-01-01 RX ORDER — APIXABAN 2.5 MG/1
1 TABLET, FILM COATED ORAL
Qty: 1 | Refills: 0
Start: 2021-01-01

## 2021-01-01 RX ORDER — VASOPRESSIN 20 [USP'U]/ML
0.04 INJECTION INTRAVENOUS
Qty: 50 | Refills: 0 | Status: DISCONTINUED | OUTPATIENT
Start: 2021-01-01 | End: 2021-01-01

## 2021-01-01 RX ORDER — FUROSEMIDE 40 MG
20 TABLET ORAL ONCE
Refills: 0 | Status: COMPLETED | OUTPATIENT
Start: 2021-01-01 | End: 2021-01-01

## 2021-01-01 RX ORDER — AMIODARONE HYDROCHLORIDE 400 MG/1
200 TABLET ORAL
Refills: 0 | Status: DISCONTINUED | OUTPATIENT
Start: 2021-01-01 | End: 2021-01-01

## 2021-01-01 RX ORDER — HEPARIN SODIUM 5000 [USP'U]/ML
2500 INJECTION INTRAVENOUS; SUBCUTANEOUS EVERY 6 HOURS
Refills: 0 | Status: DISCONTINUED | OUTPATIENT
Start: 2021-01-01 | End: 2021-01-01

## 2021-01-01 RX ORDER — FENTANYL CITRATE 50 UG/ML
100 INJECTION INTRAVENOUS ONCE
Refills: 0 | Status: DISCONTINUED | OUTPATIENT
Start: 2021-01-01 | End: 2021-01-01

## 2021-01-01 RX ORDER — TAMSULOSIN HYDROCHLORIDE 0.4 MG/1
1 CAPSULE ORAL
Qty: 0 | Refills: 0 | DISCHARGE

## 2021-01-01 RX ORDER — VANCOMYCIN HCL 1 G
VIAL (EA) INTRAVENOUS
Refills: 0 | Status: DISCONTINUED | OUTPATIENT
Start: 2021-01-01 | End: 2021-01-01

## 2021-01-01 RX ORDER — ASCORBIC ACID 60 MG
500 TABLET,CHEWABLE ORAL DAILY
Refills: 0 | Status: DISCONTINUED | OUTPATIENT
Start: 2021-01-01 | End: 2021-01-01

## 2021-01-01 RX ORDER — HUMAN INSULIN 100 [IU]/ML
10 INJECTION, SUSPENSION SUBCUTANEOUS EVERY 6 HOURS
Refills: 0 | Status: DISCONTINUED | OUTPATIENT
Start: 2021-01-01 | End: 2021-01-01

## 2021-01-01 RX ORDER — INSULIN LISPRO 100/ML
VIAL (ML) SUBCUTANEOUS AT BEDTIME
Refills: 0 | Status: DISCONTINUED | OUTPATIENT
Start: 2021-01-01 | End: 2021-01-01

## 2021-01-01 RX ORDER — PIPERACILLIN AND TAZOBACTAM 4; .5 G/20ML; G/20ML
3.38 INJECTION, POWDER, LYOPHILIZED, FOR SOLUTION INTRAVENOUS ONCE
Refills: 0 | Status: COMPLETED | OUTPATIENT
Start: 2021-01-01 | End: 2021-01-01

## 2021-01-01 RX ORDER — MORPHINE SULFATE 50 MG/1
1 CAPSULE, EXTENDED RELEASE ORAL
Qty: 100 | Refills: 0 | Status: DISCONTINUED | OUTPATIENT
Start: 2021-01-01 | End: 2021-01-01

## 2021-01-01 RX ORDER — ACETAMINOPHEN 500 MG
650 TABLET ORAL EVERY 4 HOURS
Refills: 0 | Status: DISCONTINUED | OUTPATIENT
Start: 2021-01-01 | End: 2021-01-01

## 2021-01-01 RX ORDER — INSULIN LISPRO 100/ML
VIAL (ML) SUBCUTANEOUS EVERY 6 HOURS
Refills: 0 | Status: DISCONTINUED | OUTPATIENT
Start: 2021-01-01 | End: 2021-01-01

## 2021-01-01 RX ORDER — SODIUM CHLORIDE 9 MG/ML
1 INJECTION INTRAMUSCULAR; INTRAVENOUS; SUBCUTANEOUS EVERY 6 HOURS
Refills: 0 | Status: DISCONTINUED | OUTPATIENT
Start: 2021-01-01 | End: 2021-01-01

## 2021-01-01 RX ORDER — HEPARIN SODIUM 5000 [USP'U]/ML
5500 INJECTION INTRAVENOUS; SUBCUTANEOUS ONCE
Refills: 0 | Status: DISCONTINUED | OUTPATIENT
Start: 2021-01-01 | End: 2021-01-01

## 2021-01-01 RX ORDER — PIPERACILLIN AND TAZOBACTAM 4; .5 G/20ML; G/20ML
3.38 INJECTION, POWDER, LYOPHILIZED, FOR SOLUTION INTRAVENOUS EVERY 8 HOURS
Refills: 0 | Status: COMPLETED | OUTPATIENT
Start: 2021-01-01 | End: 2021-01-01

## 2021-01-01 RX ORDER — PANTOPRAZOLE SODIUM 20 MG/1
40 TABLET, DELAYED RELEASE ORAL
Refills: 0 | Status: DISCONTINUED | OUTPATIENT
Start: 2021-01-01 | End: 2021-01-01

## 2021-01-01 RX ORDER — INSULIN GLARGINE 100 [IU]/ML
18 INJECTION, SOLUTION SUBCUTANEOUS AT BEDTIME
Refills: 0 | Status: DISCONTINUED | OUTPATIENT
Start: 2021-01-01 | End: 2021-01-01

## 2021-01-01 RX ORDER — AMIODARONE HYDROCHLORIDE 400 MG/1
1 TABLET ORAL
Qty: 450 | Refills: 0 | Status: DISCONTINUED | OUTPATIENT
Start: 2021-01-01 | End: 2021-01-01

## 2021-01-01 RX ORDER — ALBUTEROL 90 UG/1
2 AEROSOL, METERED ORAL EVERY 4 HOURS
Refills: 0 | Status: DISCONTINUED | OUTPATIENT
Start: 2021-01-01 | End: 2021-01-01

## 2021-01-01 RX ORDER — DEXAMETHASONE 0.5 MG/5ML
6 ELIXIR ORAL EVERY 24 HOURS
Refills: 0 | Status: COMPLETED | OUTPATIENT
Start: 2021-01-01 | End: 2021-01-01

## 2021-01-01 RX ORDER — CHLORHEXIDINE GLUCONATE 213 G/1000ML
1 SOLUTION TOPICAL
Refills: 0 | Status: DISCONTINUED | OUTPATIENT
Start: 2021-01-01 | End: 2021-01-01

## 2021-01-01 RX ORDER — REMDESIVIR 5 MG/ML
INJECTION INTRAVENOUS
Refills: 0 | Status: COMPLETED | OUTPATIENT
Start: 2021-01-01 | End: 2021-01-01

## 2021-01-01 RX ORDER — FENTANYL CITRATE 50 UG/ML
50 INJECTION INTRAVENOUS ONCE
Refills: 0 | Status: DISCONTINUED | OUTPATIENT
Start: 2021-01-01 | End: 2021-01-01

## 2021-01-01 RX ORDER — METOPROLOL TARTRATE 50 MG
12.5 TABLET ORAL
Refills: 0 | Status: DISCONTINUED | OUTPATIENT
Start: 2021-01-01 | End: 2021-01-01

## 2021-01-01 RX ORDER — PHENYLEPHRINE HYDROCHLORIDE 10 MG/ML
1 INJECTION INTRAVENOUS
Qty: 160 | Refills: 0 | Status: DISCONTINUED | OUTPATIENT
Start: 2021-01-01 | End: 2021-01-01

## 2021-01-01 RX ORDER — ATORVASTATIN CALCIUM 80 MG/1
1 TABLET, FILM COATED ORAL
Qty: 0 | Refills: 0 | DISCHARGE

## 2021-01-01 RX ORDER — AMLODIPINE BESYLATE 2.5 MG/1
1 TABLET ORAL
Qty: 0 | Refills: 0 | DISCHARGE

## 2021-01-01 RX ADMIN — CHLORHEXIDINE GLUCONATE 15 MILLILITER(S): 213 SOLUTION TOPICAL at 07:01

## 2021-01-01 RX ADMIN — CHLORHEXIDINE GLUCONATE 1 APPLICATION(S): 213 SOLUTION TOPICAL at 07:09

## 2021-01-01 RX ADMIN — ATORVASTATIN CALCIUM 40 MILLIGRAM(S): 80 TABLET, FILM COATED ORAL at 21:42

## 2021-01-01 RX ADMIN — PANTOPRAZOLE SODIUM 40 MILLIGRAM(S): 20 TABLET, DELAYED RELEASE ORAL at 06:49

## 2021-01-01 RX ADMIN — ENOXAPARIN SODIUM 70 MILLIGRAM(S): 100 INJECTION SUBCUTANEOUS at 11:34

## 2021-01-01 RX ADMIN — SENNA PLUS 2 TABLET(S): 8.6 TABLET ORAL at 23:00

## 2021-01-01 RX ADMIN — CLOPIDOGREL BISULFATE 75 MILLIGRAM(S): 75 TABLET, FILM COATED ORAL at 11:32

## 2021-01-01 RX ADMIN — Medication 0: at 22:49

## 2021-01-01 RX ADMIN — Medication 2: at 10:06

## 2021-01-01 RX ADMIN — CHLORHEXIDINE GLUCONATE 1 APPLICATION(S): 213 SOLUTION TOPICAL at 05:42

## 2021-01-01 RX ADMIN — CLOPIDOGREL BISULFATE 75 MILLIGRAM(S): 75 TABLET, FILM COATED ORAL at 11:30

## 2021-01-01 RX ADMIN — Medication 6 MILLIGRAM(S): at 18:30

## 2021-01-01 RX ADMIN — Medication 250 MILLIGRAM(S): at 08:17

## 2021-01-01 RX ADMIN — ATORVASTATIN CALCIUM 40 MILLIGRAM(S): 80 TABLET, FILM COATED ORAL at 22:28

## 2021-01-01 RX ADMIN — HEPARIN SODIUM 900 UNIT(S)/HR: 5000 INJECTION INTRAVENOUS; SUBCUTANEOUS at 07:29

## 2021-01-01 RX ADMIN — CHLORHEXIDINE GLUCONATE 15 MILLILITER(S): 213 SOLUTION TOPICAL at 06:15

## 2021-01-01 RX ADMIN — CISATRACURIUM BESYLATE 12.7 MICROGRAM(S)/KG/MIN: 2 INJECTION INTRAVENOUS at 22:54

## 2021-01-01 RX ADMIN — PANTOPRAZOLE SODIUM 40 MILLIGRAM(S): 20 TABLET, DELAYED RELEASE ORAL at 11:30

## 2021-01-01 RX ADMIN — Medication 2: at 06:14

## 2021-01-01 RX ADMIN — POLYETHYLENE GLYCOL 3350 17 GRAM(S): 17 POWDER, FOR SOLUTION ORAL at 11:01

## 2021-01-01 RX ADMIN — FENTANYL CITRATE 100 MICROGRAM(S): 50 INJECTION INTRAVENOUS at 04:33

## 2021-01-01 RX ADMIN — Medication 50 MILLILITER(S): at 18:16

## 2021-01-01 RX ADMIN — ROBINUL 0.2 MILLIGRAM(S): 0.2 INJECTION INTRAMUSCULAR; INTRAVENOUS at 17:59

## 2021-01-01 RX ADMIN — PIPERACILLIN AND TAZOBACTAM 25 GRAM(S): 4; .5 INJECTION, POWDER, LYOPHILIZED, FOR SOLUTION INTRAVENOUS at 14:02

## 2021-01-01 RX ADMIN — Medication 50 MILLILITER(S): at 05:09

## 2021-01-01 RX ADMIN — TAMSULOSIN HYDROCHLORIDE 0.4 MILLIGRAM(S): 0.4 CAPSULE ORAL at 22:28

## 2021-01-01 RX ADMIN — Medication 1: at 13:37

## 2021-01-01 RX ADMIN — PIPERACILLIN AND TAZOBACTAM 25 GRAM(S): 4; .5 INJECTION, POWDER, LYOPHILIZED, FOR SOLUTION INTRAVENOUS at 14:11

## 2021-01-01 RX ADMIN — Medication 12.5 MILLIGRAM(S): at 18:48

## 2021-01-01 RX ADMIN — Medication 6 MILLIGRAM(S): at 18:21

## 2021-01-01 RX ADMIN — Medication 81 MILLIGRAM(S): at 14:15

## 2021-01-01 RX ADMIN — Medication 12.5 MILLIGRAM(S): at 17:17

## 2021-01-01 RX ADMIN — CHLORHEXIDINE GLUCONATE 15 MILLILITER(S): 213 SOLUTION TOPICAL at 05:42

## 2021-01-01 RX ADMIN — ENOXAPARIN SODIUM 40 MILLIGRAM(S): 100 INJECTION SUBCUTANEOUS at 11:27

## 2021-01-01 RX ADMIN — PANTOPRAZOLE SODIUM 40 MILLIGRAM(S): 20 TABLET, DELAYED RELEASE ORAL at 06:11

## 2021-01-01 RX ADMIN — POLYETHYLENE GLYCOL 3350 17 GRAM(S): 17 POWDER, FOR SOLUTION ORAL at 11:32

## 2021-01-01 RX ADMIN — Medication 1: at 18:39

## 2021-01-01 RX ADMIN — PANTOPRAZOLE SODIUM 40 MILLIGRAM(S): 20 TABLET, DELAYED RELEASE ORAL at 11:32

## 2021-01-01 RX ADMIN — Medication 975 MILLIGRAM(S): at 18:26

## 2021-01-01 RX ADMIN — Medication 4: at 06:31

## 2021-01-01 RX ADMIN — PANTOPRAZOLE SODIUM 40 MILLIGRAM(S): 20 TABLET, DELAYED RELEASE ORAL at 06:00

## 2021-01-01 RX ADMIN — Medication 12.5 MILLIGRAM(S): at 05:20

## 2021-01-01 RX ADMIN — TAMSULOSIN HYDROCHLORIDE 0.4 MILLIGRAM(S): 0.4 CAPSULE ORAL at 22:04

## 2021-01-01 RX ADMIN — PANTOPRAZOLE SODIUM 40 MILLIGRAM(S): 20 TABLET, DELAYED RELEASE ORAL at 11:11

## 2021-01-01 RX ADMIN — ENOXAPARIN SODIUM 70 MILLIGRAM(S): 100 INJECTION SUBCUTANEOUS at 21:39

## 2021-01-01 RX ADMIN — FENTANYL CITRATE 0.7 MICROGRAM(S)/KG/HR: 50 INJECTION INTRAVENOUS at 22:09

## 2021-01-01 RX ADMIN — PROPOFOL 8.44 MICROGRAM(S)/KG/MIN: 10 INJECTION, EMULSION INTRAVENOUS at 19:47

## 2021-01-01 RX ADMIN — ATORVASTATIN CALCIUM 40 MILLIGRAM(S): 80 TABLET, FILM COATED ORAL at 21:33

## 2021-01-01 RX ADMIN — PANTOPRAZOLE SODIUM 40 MILLIGRAM(S): 20 TABLET, DELAYED RELEASE ORAL at 13:03

## 2021-01-01 RX ADMIN — Medication 250 MILLILITER(S): at 10:50

## 2021-01-01 RX ADMIN — REMDESIVIR 500 MILLIGRAM(S): 5 INJECTION INTRAVENOUS at 03:52

## 2021-01-01 RX ADMIN — CISATRACURIUM BESYLATE 12.7 MICROGRAM(S)/KG/MIN: 2 INJECTION INTRAVENOUS at 19:50

## 2021-01-01 RX ADMIN — Medication 2: at 14:19

## 2021-01-01 RX ADMIN — PANTOPRAZOLE SODIUM 40 MILLIGRAM(S): 20 TABLET, DELAYED RELEASE ORAL at 06:39

## 2021-01-01 RX ADMIN — Medication 12.5 MILLIGRAM(S): at 05:59

## 2021-01-01 RX ADMIN — Medication 12.5 MILLIGRAM(S): at 18:21

## 2021-01-01 RX ADMIN — CHLORHEXIDINE GLUCONATE 1 APPLICATION(S): 213 SOLUTION TOPICAL at 05:46

## 2021-01-01 RX ADMIN — Medication 125 MILLILITER(S): at 05:55

## 2021-01-01 RX ADMIN — CHLORHEXIDINE GLUCONATE 1 APPLICATION(S): 213 SOLUTION TOPICAL at 06:12

## 2021-01-01 RX ADMIN — Medication 12.5 MILLIGRAM(S): at 05:42

## 2021-01-01 RX ADMIN — HEPARIN SODIUM 900 UNIT(S)/HR: 5000 INJECTION INTRAVENOUS; SUBCUTANEOUS at 22:38

## 2021-01-01 RX ADMIN — AMIODARONE HYDROCHLORIDE 16.7 MG/MIN: 400 TABLET ORAL at 13:48

## 2021-01-01 RX ADMIN — Medication 6 MILLIGRAM(S): at 17:58

## 2021-01-01 RX ADMIN — PIPERACILLIN AND TAZOBACTAM 25 GRAM(S): 4; .5 INJECTION, POWDER, LYOPHILIZED, FOR SOLUTION INTRAVENOUS at 05:22

## 2021-01-01 RX ADMIN — Medication 2: at 09:39

## 2021-01-01 RX ADMIN — AMIODARONE HYDROCHLORIDE 16.7 MG/MIN: 400 TABLET ORAL at 05:10

## 2021-01-01 RX ADMIN — Medication 1: at 13:24

## 2021-01-01 RX ADMIN — CHLORHEXIDINE GLUCONATE 1 APPLICATION(S): 213 SOLUTION TOPICAL at 05:23

## 2021-01-01 RX ADMIN — Medication 2: at 14:14

## 2021-01-01 RX ADMIN — REMDESIVIR 500 MILLIGRAM(S): 5 INJECTION INTRAVENOUS at 03:48

## 2021-01-01 RX ADMIN — Medication 50 MILLIGRAM(S): at 11:00

## 2021-01-01 RX ADMIN — Medication 4: at 11:10

## 2021-01-01 RX ADMIN — SODIUM CHLORIDE 1000 MILLILITER(S): 9 INJECTION INTRAMUSCULAR; INTRAVENOUS; SUBCUTANEOUS at 18:26

## 2021-01-01 RX ADMIN — ATORVASTATIN CALCIUM 40 MILLIGRAM(S): 80 TABLET, FILM COATED ORAL at 21:43

## 2021-01-01 RX ADMIN — PANTOPRAZOLE SODIUM 40 MILLIGRAM(S): 20 TABLET, DELAYED RELEASE ORAL at 11:34

## 2021-01-01 RX ADMIN — ATORVASTATIN CALCIUM 40 MILLIGRAM(S): 80 TABLET, FILM COATED ORAL at 22:04

## 2021-01-01 RX ADMIN — Medication 2: at 09:54

## 2021-01-01 RX ADMIN — HEPARIN SODIUM 0 UNIT(S)/HR: 5000 INJECTION INTRAVENOUS; SUBCUTANEOUS at 08:01

## 2021-01-01 RX ADMIN — Medication 1: at 21:33

## 2021-01-01 RX ADMIN — CLOPIDOGREL BISULFATE 75 MILLIGRAM(S): 75 TABLET, FILM COATED ORAL at 12:52

## 2021-01-01 RX ADMIN — TAMSULOSIN HYDROCHLORIDE 0.4 MILLIGRAM(S): 0.4 CAPSULE ORAL at 23:07

## 2021-01-01 RX ADMIN — HEPARIN SODIUM 600 UNIT(S)/HR: 5000 INJECTION INTRAVENOUS; SUBCUTANEOUS at 06:24

## 2021-01-01 RX ADMIN — HEPARIN SODIUM 600 UNIT(S)/HR: 5000 INJECTION INTRAVENOUS; SUBCUTANEOUS at 14:29

## 2021-01-01 RX ADMIN — Medication 6: at 00:29

## 2021-01-01 RX ADMIN — ATORVASTATIN CALCIUM 40 MILLIGRAM(S): 80 TABLET, FILM COATED ORAL at 22:19

## 2021-01-01 RX ADMIN — Medication 12.5 MILLIGRAM(S): at 06:11

## 2021-01-01 RX ADMIN — PANTOPRAZOLE SODIUM 40 MILLIGRAM(S): 20 TABLET, DELAYED RELEASE ORAL at 05:19

## 2021-01-01 RX ADMIN — Medication 12.5 MILLIGRAM(S): at 17:58

## 2021-01-01 RX ADMIN — SODIUM CHLORIDE 500 MILLILITER(S): 9 INJECTION INTRAMUSCULAR; INTRAVENOUS; SUBCUTANEOUS at 18:55

## 2021-01-01 RX ADMIN — Medication 81 MILLIGRAM(S): at 11:50

## 2021-01-01 RX ADMIN — Medication 1: at 09:54

## 2021-01-01 RX ADMIN — Medication 1: at 18:19

## 2021-01-01 RX ADMIN — Medication 12.5 MILLIGRAM(S): at 05:19

## 2021-01-01 RX ADMIN — PIPERACILLIN AND TAZOBACTAM 25 GRAM(S): 4; .5 INJECTION, POWDER, LYOPHILIZED, FOR SOLUTION INTRAVENOUS at 16:46

## 2021-01-01 RX ADMIN — AMIODARONE HYDROCHLORIDE 16.7 MG/MIN: 400 TABLET ORAL at 18:06

## 2021-01-01 RX ADMIN — HEPARIN SODIUM 900 UNIT(S)/HR: 5000 INJECTION INTRAVENOUS; SUBCUTANEOUS at 07:53

## 2021-01-01 RX ADMIN — Medication 4: at 12:35

## 2021-01-01 RX ADMIN — HEPARIN SODIUM 1200 UNIT(S)/HR: 5000 INJECTION INTRAVENOUS; SUBCUTANEOUS at 04:53

## 2021-01-01 RX ADMIN — FENTANYL CITRATE 0.7 MICROGRAM(S)/KG/HR: 50 INJECTION INTRAVENOUS at 05:56

## 2021-01-01 RX ADMIN — Medication 6 MILLIGRAM(S): at 18:00

## 2021-01-01 RX ADMIN — PIPERACILLIN AND TAZOBACTAM 25 GRAM(S): 4; .5 INJECTION, POWDER, LYOPHILIZED, FOR SOLUTION INTRAVENOUS at 05:09

## 2021-01-01 RX ADMIN — SODIUM CHLORIDE 1 GRAM(S): 9 INJECTION INTRAMUSCULAR; INTRAVENOUS; SUBCUTANEOUS at 14:09

## 2021-01-01 RX ADMIN — PIPERACILLIN AND TAZOBACTAM 25 GRAM(S): 4; .5 INJECTION, POWDER, LYOPHILIZED, FOR SOLUTION INTRAVENOUS at 13:23

## 2021-01-01 RX ADMIN — CLOPIDOGREL BISULFATE 75 MILLIGRAM(S): 75 TABLET, FILM COATED ORAL at 12:02

## 2021-01-01 RX ADMIN — Medication 12.5 MILLIGRAM(S): at 18:00

## 2021-01-01 RX ADMIN — FENTANYL CITRATE 0.7 MICROGRAM(S)/KG/HR: 50 INJECTION INTRAVENOUS at 22:41

## 2021-01-01 RX ADMIN — PANTOPRAZOLE SODIUM 40 MILLIGRAM(S): 20 TABLET, DELAYED RELEASE ORAL at 05:20

## 2021-01-01 RX ADMIN — HUMAN INSULIN 4 UNIT(S): 100 INJECTION, SUSPENSION SUBCUTANEOUS at 18:22

## 2021-01-01 RX ADMIN — Medication 12.5 MILLIGRAM(S): at 17:48

## 2021-01-01 RX ADMIN — Medication 1: at 05:54

## 2021-01-01 RX ADMIN — PROPOFOL 8.44 MICROGRAM(S)/KG/MIN: 10 INJECTION, EMULSION INTRAVENOUS at 00:24

## 2021-01-01 RX ADMIN — INSULIN GLARGINE 18 UNIT(S): 100 INJECTION, SOLUTION SUBCUTANEOUS at 21:44

## 2021-01-01 RX ADMIN — TAMSULOSIN HYDROCHLORIDE 0.4 MILLIGRAM(S): 0.4 CAPSULE ORAL at 22:12

## 2021-01-01 RX ADMIN — Medication 81 MILLIGRAM(S): at 12:05

## 2021-01-01 RX ADMIN — HEPARIN SODIUM 900 UNIT(S)/HR: 5000 INJECTION INTRAVENOUS; SUBCUTANEOUS at 07:26

## 2021-01-01 RX ADMIN — Medication 6 MILLIGRAM(S): at 18:20

## 2021-01-01 RX ADMIN — HEPARIN SODIUM 700 UNIT(S)/HR: 5000 INJECTION INTRAVENOUS; SUBCUTANEOUS at 21:34

## 2021-01-01 RX ADMIN — Medication 81 MILLIGRAM(S): at 11:33

## 2021-01-01 RX ADMIN — HEPARIN SODIUM 100 UNIT(S)/HR: 5000 INJECTION INTRAVENOUS; SUBCUTANEOUS at 18:35

## 2021-01-01 RX ADMIN — TAMSULOSIN HYDROCHLORIDE 0.4 MILLIGRAM(S): 0.4 CAPSULE ORAL at 21:33

## 2021-01-01 RX ADMIN — Medication 125 MILLILITER(S): at 10:10

## 2021-01-01 RX ADMIN — CISATRACURIUM BESYLATE 12.7 MICROGRAM(S)/KG/MIN: 2 INJECTION INTRAVENOUS at 05:04

## 2021-01-01 RX ADMIN — HEPARIN SODIUM 900 UNIT(S)/HR: 5000 INJECTION INTRAVENOUS; SUBCUTANEOUS at 15:57

## 2021-01-01 RX ADMIN — Medication 975 MILLIGRAM(S): at 19:21

## 2021-01-01 RX ADMIN — Medication 81 MILLIGRAM(S): at 12:39

## 2021-01-01 RX ADMIN — POLYETHYLENE GLYCOL 3350 17 GRAM(S): 17 POWDER, FOR SOLUTION ORAL at 11:11

## 2021-01-01 RX ADMIN — INSULIN GLARGINE 15 UNIT(S): 100 INJECTION, SOLUTION SUBCUTANEOUS at 22:03

## 2021-01-01 RX ADMIN — PIPERACILLIN AND TAZOBACTAM 25 GRAM(S): 4; .5 INJECTION, POWDER, LYOPHILIZED, FOR SOLUTION INTRAVENOUS at 22:58

## 2021-01-01 RX ADMIN — Medication 81 MILLIGRAM(S): at 11:01

## 2021-01-01 RX ADMIN — CLOPIDOGREL BISULFATE 75 MILLIGRAM(S): 75 TABLET, FILM COATED ORAL at 13:04

## 2021-01-01 RX ADMIN — INSULIN GLARGINE 18 UNIT(S): 100 INJECTION, SOLUTION SUBCUTANEOUS at 22:21

## 2021-01-01 RX ADMIN — Medication 50 MILLIGRAM(S): at 05:40

## 2021-01-01 RX ADMIN — Medication 81 MILLIGRAM(S): at 11:34

## 2021-01-01 RX ADMIN — CLOPIDOGREL BISULFATE 75 MILLIGRAM(S): 75 TABLET, FILM COATED ORAL at 11:14

## 2021-01-01 RX ADMIN — HUMAN INSULIN 4 UNIT(S): 100 INJECTION, SUSPENSION SUBCUTANEOUS at 00:27

## 2021-01-01 RX ADMIN — PIPERACILLIN AND TAZOBACTAM 25 GRAM(S): 4; .5 INJECTION, POWDER, LYOPHILIZED, FOR SOLUTION INTRAVENOUS at 07:09

## 2021-01-01 RX ADMIN — PANTOPRAZOLE SODIUM 40 MILLIGRAM(S): 20 TABLET, DELAYED RELEASE ORAL at 11:00

## 2021-01-01 RX ADMIN — Medication 0: at 13:15

## 2021-01-01 RX ADMIN — HUMAN INSULIN 10 UNIT(S): 100 INJECTION, SUSPENSION SUBCUTANEOUS at 05:41

## 2021-01-01 RX ADMIN — HUMAN INSULIN 4 UNIT(S): 100 INJECTION, SUSPENSION SUBCUTANEOUS at 11:10

## 2021-01-01 RX ADMIN — POLYETHYLENE GLYCOL 3350 17 GRAM(S): 17 POWDER, FOR SOLUTION ORAL at 11:27

## 2021-01-01 RX ADMIN — Medication 6: at 17:15

## 2021-01-01 RX ADMIN — CISATRACURIUM BESYLATE 12.7 MICROGRAM(S)/KG/MIN: 2 INJECTION INTRAVENOUS at 09:42

## 2021-01-01 RX ADMIN — Medication 63.75 MILLIMOLE(S): at 11:02

## 2021-01-01 RX ADMIN — CHLORHEXIDINE GLUCONATE 15 MILLILITER(S): 213 SOLUTION TOPICAL at 16:47

## 2021-01-01 RX ADMIN — CLOPIDOGREL BISULFATE 75 MILLIGRAM(S): 75 TABLET, FILM COATED ORAL at 14:13

## 2021-01-01 RX ADMIN — Medication 250 MILLIGRAM(S): at 18:24

## 2021-01-01 RX ADMIN — Medication 50 MILLIGRAM(S): at 17:13

## 2021-01-01 RX ADMIN — PROPOFOL 8.44 MICROGRAM(S)/KG/MIN: 10 INJECTION, EMULSION INTRAVENOUS at 22:59

## 2021-01-01 RX ADMIN — Medication 6 MILLIGRAM(S): at 17:17

## 2021-01-01 RX ADMIN — ATORVASTATIN CALCIUM 40 MILLIGRAM(S): 80 TABLET, FILM COATED ORAL at 22:07

## 2021-01-01 RX ADMIN — PIPERACILLIN AND TAZOBACTAM 25 GRAM(S): 4; .5 INJECTION, POWDER, LYOPHILIZED, FOR SOLUTION INTRAVENOUS at 14:41

## 2021-01-01 RX ADMIN — CHLORHEXIDINE GLUCONATE 15 MILLILITER(S): 213 SOLUTION TOPICAL at 06:30

## 2021-01-01 RX ADMIN — Medication 4: at 01:20

## 2021-01-01 RX ADMIN — INSULIN GLARGINE 18 UNIT(S): 100 INJECTION, SOLUTION SUBCUTANEOUS at 22:06

## 2021-01-01 RX ADMIN — ENOXAPARIN SODIUM 40 MILLIGRAM(S): 100 INJECTION SUBCUTANEOUS at 11:11

## 2021-01-01 RX ADMIN — HEPARIN SODIUM 900 UNIT(S)/HR: 5000 INJECTION INTRAVENOUS; SUBCUTANEOUS at 09:41

## 2021-01-01 RX ADMIN — Medication 6 MILLIGRAM(S): at 18:23

## 2021-01-01 RX ADMIN — ENOXAPARIN SODIUM 40 MILLIGRAM(S): 100 INJECTION SUBCUTANEOUS at 11:32

## 2021-01-01 RX ADMIN — Medication 2: at 18:20

## 2021-01-01 RX ADMIN — Medication 50 MILLIGRAM(S): at 02:32

## 2021-01-01 RX ADMIN — Medication 2: at 10:15

## 2021-01-01 RX ADMIN — INSULIN GLARGINE 18 UNIT(S): 100 INJECTION, SOLUTION SUBCUTANEOUS at 22:05

## 2021-01-01 RX ADMIN — Medication 50 MILLIEQUIVALENT(S): at 04:14

## 2021-01-01 RX ADMIN — HUMAN INSULIN 4 UNIT(S): 100 INJECTION, SUSPENSION SUBCUTANEOUS at 06:31

## 2021-01-01 RX ADMIN — Medication 2 MILLIGRAM(S): at 17:58

## 2021-01-01 RX ADMIN — SENNA PLUS 2 TABLET(S): 8.6 TABLET ORAL at 21:45

## 2021-01-01 RX ADMIN — CHLORHEXIDINE GLUCONATE 15 MILLILITER(S): 213 SOLUTION TOPICAL at 17:01

## 2021-01-01 RX ADMIN — FENTANYL CITRATE 0.7 MICROGRAM(S)/KG/HR: 50 INJECTION INTRAVENOUS at 22:58

## 2021-01-01 RX ADMIN — PIPERACILLIN AND TAZOBACTAM 25 GRAM(S): 4; .5 INJECTION, POWDER, LYOPHILIZED, FOR SOLUTION INTRAVENOUS at 22:18

## 2021-01-01 RX ADMIN — CHLORHEXIDINE GLUCONATE 15 MILLILITER(S): 213 SOLUTION TOPICAL at 05:05

## 2021-01-01 RX ADMIN — Medication 12.5 MILLIGRAM(S): at 06:00

## 2021-01-01 RX ADMIN — Medication 81 MILLIGRAM(S): at 12:47

## 2021-01-01 RX ADMIN — ATORVASTATIN CALCIUM 40 MILLIGRAM(S): 80 TABLET, FILM COATED ORAL at 22:03

## 2021-01-01 RX ADMIN — Medication 1: at 22:10

## 2021-01-01 RX ADMIN — HEPARIN SODIUM 0 UNIT(S)/HR: 5000 INJECTION INTRAVENOUS; SUBCUTANEOUS at 12:34

## 2021-01-01 RX ADMIN — VASOPRESSIN 2.4 UNIT(S)/MIN: 20 INJECTION INTRAVENOUS at 00:59

## 2021-01-01 RX ADMIN — HEPARIN SODIUM 1000 UNIT(S)/HR: 5000 INJECTION INTRAVENOUS; SUBCUTANEOUS at 21:38

## 2021-01-01 RX ADMIN — POLYETHYLENE GLYCOL 3350 17 GRAM(S): 17 POWDER, FOR SOLUTION ORAL at 12:04

## 2021-01-01 RX ADMIN — CLOPIDOGREL BISULFATE 75 MILLIGRAM(S): 75 TABLET, FILM COATED ORAL at 12:39

## 2021-01-01 RX ADMIN — PIPERACILLIN AND TAZOBACTAM 25 GRAM(S): 4; .5 INJECTION, POWDER, LYOPHILIZED, FOR SOLUTION INTRAVENOUS at 22:10

## 2021-01-01 RX ADMIN — PANTOPRAZOLE SODIUM 40 MILLIGRAM(S): 20 TABLET, DELAYED RELEASE ORAL at 05:42

## 2021-01-01 RX ADMIN — PHENYLEPHRINE HYDROCHLORIDE 13.2 MICROGRAM(S)/KG/MIN: 10 INJECTION INTRAVENOUS at 18:06

## 2021-01-01 RX ADMIN — HUMAN INSULIN 10 UNIT(S): 100 INJECTION, SUSPENSION SUBCUTANEOUS at 01:20

## 2021-01-01 RX ADMIN — Medication 12.5 MILLIGRAM(S): at 06:16

## 2021-01-01 RX ADMIN — Medication 100 GRAM(S): at 10:04

## 2021-01-01 RX ADMIN — Medication 500 MICROGRAM(S): at 09:59

## 2021-01-01 RX ADMIN — ENOXAPARIN SODIUM 70 MILLIGRAM(S): 100 INJECTION SUBCUTANEOUS at 13:03

## 2021-01-01 RX ADMIN — TAMSULOSIN HYDROCHLORIDE 0.4 MILLIGRAM(S): 0.4 CAPSULE ORAL at 21:29

## 2021-01-01 RX ADMIN — TAMSULOSIN HYDROCHLORIDE 0.4 MILLIGRAM(S): 0.4 CAPSULE ORAL at 22:07

## 2021-01-01 RX ADMIN — REMDESIVIR 500 MILLIGRAM(S): 5 INJECTION INTRAVENOUS at 03:11

## 2021-01-01 RX ADMIN — Medication 81 MILLIGRAM(S): at 13:56

## 2021-01-01 RX ADMIN — CHLORHEXIDINE GLUCONATE 15 MILLILITER(S): 213 SOLUTION TOPICAL at 17:15

## 2021-01-01 RX ADMIN — Medication 1: at 22:20

## 2021-01-01 RX ADMIN — Medication 81 MILLIGRAM(S): at 11:11

## 2021-01-01 RX ADMIN — Medication 500 MILLIGRAM(S): at 12:03

## 2021-01-01 RX ADMIN — CLOPIDOGREL BISULFATE 75 MILLIGRAM(S): 75 TABLET, FILM COATED ORAL at 11:29

## 2021-01-01 RX ADMIN — Medication 81 MILLIGRAM(S): at 11:29

## 2021-01-01 RX ADMIN — HEPARIN SODIUM 100 UNIT(S)/HR: 5000 INJECTION INTRAVENOUS; SUBCUTANEOUS at 02:29

## 2021-01-01 RX ADMIN — SODIUM CHLORIDE 1000 MILLILITER(S): 9 INJECTION INTRAMUSCULAR; INTRAVENOUS; SUBCUTANEOUS at 05:54

## 2021-01-01 RX ADMIN — ENOXAPARIN SODIUM 70 MILLIGRAM(S): 100 INJECTION SUBCUTANEOUS at 12:03

## 2021-01-01 RX ADMIN — PIPERACILLIN AND TAZOBACTAM 25 GRAM(S): 4; .5 INJECTION, POWDER, LYOPHILIZED, FOR SOLUTION INTRAVENOUS at 14:30

## 2021-01-01 RX ADMIN — REMDESIVIR 500 MILLIGRAM(S): 5 INJECTION INTRAVENOUS at 03:05

## 2021-01-01 RX ADMIN — ATORVASTATIN CALCIUM 40 MILLIGRAM(S): 80 TABLET, FILM COATED ORAL at 22:10

## 2021-01-01 RX ADMIN — CHLORHEXIDINE GLUCONATE 15 MILLILITER(S): 213 SOLUTION TOPICAL at 18:15

## 2021-01-01 RX ADMIN — POLYETHYLENE GLYCOL 3350 17 GRAM(S): 17 POWDER, FOR SOLUTION ORAL at 11:35

## 2021-01-01 RX ADMIN — Medication 4: at 05:41

## 2021-01-01 RX ADMIN — Medication 81 MILLIGRAM(S): at 13:47

## 2021-01-01 RX ADMIN — Medication 81 MILLIGRAM(S): at 18:30

## 2021-01-01 RX ADMIN — Medication 50 MILLIGRAM(S): at 11:15

## 2021-01-01 RX ADMIN — SENNA PLUS 2 TABLET(S): 8.6 TABLET ORAL at 22:09

## 2021-01-01 RX ADMIN — CHLORHEXIDINE GLUCONATE 15 MILLILITER(S): 213 SOLUTION TOPICAL at 05:22

## 2021-01-01 RX ADMIN — Medication 12.5 MILLIGRAM(S): at 07:23

## 2021-01-01 RX ADMIN — FENTANYL CITRATE 0.7 MICROGRAM(S)/KG/HR: 50 INJECTION INTRAVENOUS at 08:50

## 2021-01-01 RX ADMIN — REMDESIVIR 500 MILLIGRAM(S): 5 INJECTION INTRAVENOUS at 18:21

## 2021-01-01 RX ADMIN — PHENYLEPHRINE HYDROCHLORIDE 13.2 MICROGRAM(S)/KG/MIN: 10 INJECTION INTRAVENOUS at 16:47

## 2021-01-01 RX ADMIN — MORPHINE SULFATE 2 MILLIGRAM(S): 50 CAPSULE, EXTENDED RELEASE ORAL at 17:59

## 2021-01-01 RX ADMIN — Medication 81 MILLIGRAM(S): at 13:05

## 2021-01-01 RX ADMIN — Medication 2: at 18:54

## 2021-01-01 RX ADMIN — Medication 100 MILLIGRAM(S): at 22:04

## 2021-01-01 RX ADMIN — SENNA PLUS 2 TABLET(S): 8.6 TABLET ORAL at 22:44

## 2021-01-01 RX ADMIN — Medication 100 MILLIGRAM(S): at 23:07

## 2021-01-01 RX ADMIN — CISATRACURIUM BESYLATE 12.7 MICROGRAM(S)/KG/MIN: 2 INJECTION INTRAVENOUS at 21:42

## 2021-01-01 RX ADMIN — Medication 50 MILLIGRAM(S): at 18:26

## 2021-01-01 RX ADMIN — FENTANYL CITRATE 0.7 MICROGRAM(S)/KG/HR: 50 INJECTION INTRAVENOUS at 15:21

## 2021-01-01 RX ADMIN — CLOPIDOGREL BISULFATE 75 MILLIGRAM(S): 75 TABLET, FILM COATED ORAL at 13:47

## 2021-01-01 RX ADMIN — FENTANYL CITRATE 0.7 MICROGRAM(S)/KG/HR: 50 INJECTION INTRAVENOUS at 06:30

## 2021-01-01 RX ADMIN — ATORVASTATIN CALCIUM 40 MILLIGRAM(S): 80 TABLET, FILM COATED ORAL at 22:44

## 2021-01-01 RX ADMIN — PROPOFOL 8.44 MICROGRAM(S)/KG/MIN: 10 INJECTION, EMULSION INTRAVENOUS at 23:59

## 2021-01-01 RX ADMIN — ATORVASTATIN CALCIUM 40 MILLIGRAM(S): 80 TABLET, FILM COATED ORAL at 22:36

## 2021-01-01 RX ADMIN — Medication 1: at 09:31

## 2021-01-01 RX ADMIN — PIPERACILLIN AND TAZOBACTAM 25 GRAM(S): 4; .5 INJECTION, POWDER, LYOPHILIZED, FOR SOLUTION INTRAVENOUS at 06:12

## 2021-01-01 RX ADMIN — Medication 100 MILLIGRAM(S): at 14:15

## 2021-01-01 RX ADMIN — Medication 12.5 MILLIGRAM(S): at 18:20

## 2021-01-01 RX ADMIN — PIPERACILLIN AND TAZOBACTAM 25 GRAM(S): 4; .5 INJECTION, POWDER, LYOPHILIZED, FOR SOLUTION INTRAVENOUS at 21:34

## 2021-01-01 RX ADMIN — CHLORHEXIDINE GLUCONATE 15 MILLILITER(S): 213 SOLUTION TOPICAL at 05:10

## 2021-01-01 RX ADMIN — Medication 1: at 09:44

## 2021-01-01 RX ADMIN — Medication 2: at 09:43

## 2021-01-01 RX ADMIN — Medication 2: at 13:47

## 2021-01-01 RX ADMIN — PIPERACILLIN AND TAZOBACTAM 25 GRAM(S): 4; .5 INJECTION, POWDER, LYOPHILIZED, FOR SOLUTION INTRAVENOUS at 06:32

## 2021-01-01 RX ADMIN — CHLORHEXIDINE GLUCONATE 1 APPLICATION(S): 213 SOLUTION TOPICAL at 04:33

## 2021-01-01 RX ADMIN — Medication 1: at 14:07

## 2021-01-01 RX ADMIN — Medication 2: at 13:06

## 2021-01-01 RX ADMIN — POLYETHYLENE GLYCOL 3350 17 GRAM(S): 17 POWDER, FOR SOLUTION ORAL at 13:03

## 2021-01-01 RX ADMIN — REMDESIVIR 500 MILLIGRAM(S): 5 INJECTION INTRAVENOUS at 18:30

## 2021-01-01 RX ADMIN — HEPARIN SODIUM 900 UNIT(S)/HR: 5000 INJECTION INTRAVENOUS; SUBCUTANEOUS at 11:05

## 2021-01-01 RX ADMIN — ENOXAPARIN SODIUM 40 MILLIGRAM(S): 100 INJECTION SUBCUTANEOUS at 11:01

## 2021-01-01 RX ADMIN — REMDESIVIR 500 MILLIGRAM(S): 5 INJECTION INTRAVENOUS at 18:23

## 2021-01-01 RX ADMIN — TAMSULOSIN HYDROCHLORIDE 0.4 MILLIGRAM(S): 0.4 CAPSULE ORAL at 22:06

## 2021-01-01 RX ADMIN — Medication 2: at 05:50

## 2021-01-01 RX ADMIN — HEPARIN SODIUM 5500 UNIT(S): 5000 INJECTION INTRAVENOUS; SUBCUTANEOUS at 23:25

## 2021-01-01 RX ADMIN — PROPOFOL 8.44 MICROGRAM(S)/KG/MIN: 10 INJECTION, EMULSION INTRAVENOUS at 05:39

## 2021-01-01 RX ADMIN — PIPERACILLIN AND TAZOBACTAM 25 GRAM(S): 4; .5 INJECTION, POWDER, LYOPHILIZED, FOR SOLUTION INTRAVENOUS at 14:14

## 2021-01-01 RX ADMIN — CLOPIDOGREL BISULFATE 75 MILLIGRAM(S): 75 TABLET, FILM COATED ORAL at 12:22

## 2021-01-01 RX ADMIN — Medication 250 MILLIGRAM(S): at 17:14

## 2021-01-01 RX ADMIN — Medication 4: at 00:00

## 2021-01-01 RX ADMIN — AMIODARONE HYDROCHLORIDE 200 MILLIGRAM(S): 400 TABLET ORAL at 05:22

## 2021-01-01 RX ADMIN — Medication 20 MILLIGRAM(S): at 11:00

## 2021-01-01 RX ADMIN — ENOXAPARIN SODIUM 70 MILLIGRAM(S): 100 INJECTION SUBCUTANEOUS at 23:53

## 2021-01-01 RX ADMIN — SODIUM CHLORIDE 1 GRAM(S): 9 INJECTION INTRAMUSCULAR; INTRAVENOUS; SUBCUTANEOUS at 18:15

## 2021-01-01 RX ADMIN — Medication 3: at 17:59

## 2021-01-01 RX ADMIN — HEPARIN SODIUM 900 UNIT(S)/HR: 5000 INJECTION INTRAVENOUS; SUBCUTANEOUS at 17:06

## 2021-01-01 RX ADMIN — PIPERACILLIN AND TAZOBACTAM 200 GRAM(S): 4; .5 INJECTION, POWDER, LYOPHILIZED, FOR SOLUTION INTRAVENOUS at 10:05

## 2021-01-01 RX ADMIN — HEPARIN SODIUM 1000 UNIT(S)/HR: 5000 INJECTION INTRAVENOUS; SUBCUTANEOUS at 07:00

## 2021-01-01 RX ADMIN — CHLORHEXIDINE GLUCONATE 1 APPLICATION(S): 213 SOLUTION TOPICAL at 06:32

## 2021-01-01 RX ADMIN — CLOPIDOGREL BISULFATE 75 MILLIGRAM(S): 75 TABLET, FILM COATED ORAL at 11:50

## 2021-01-01 RX ADMIN — ATORVASTATIN CALCIUM 40 MILLIGRAM(S): 80 TABLET, FILM COATED ORAL at 22:58

## 2021-01-01 RX ADMIN — Medication 6 MILLIGRAM(S): at 18:48

## 2021-01-01 RX ADMIN — PIPERACILLIN AND TAZOBACTAM 25 GRAM(S): 4; .5 INJECTION, POWDER, LYOPHILIZED, FOR SOLUTION INTRAVENOUS at 21:42

## 2021-01-01 RX ADMIN — PANTOPRAZOLE SODIUM 40 MILLIGRAM(S): 20 TABLET, DELAYED RELEASE ORAL at 06:16

## 2021-01-01 RX ADMIN — PROPOFOL 21.1 MICROGRAM(S)/KG/MIN: 10 INJECTION, EMULSION INTRAVENOUS at 05:56

## 2021-01-01 RX ADMIN — CHLORHEXIDINE GLUCONATE 15 MILLILITER(S): 213 SOLUTION TOPICAL at 18:24

## 2021-01-01 RX ADMIN — HUMAN INSULIN 7 UNIT(S): 100 INJECTION, SUSPENSION SUBCUTANEOUS at 11:01

## 2021-01-01 RX ADMIN — HUMAN INSULIN 7 UNIT(S): 100 INJECTION, SUSPENSION SUBCUTANEOUS at 17:17

## 2021-01-01 RX ADMIN — PANTOPRAZOLE SODIUM 40 MILLIGRAM(S): 20 TABLET, DELAYED RELEASE ORAL at 12:02

## 2021-01-01 RX ADMIN — CHLORHEXIDINE GLUCONATE 15 MILLILITER(S): 213 SOLUTION TOPICAL at 17:13

## 2021-01-01 RX ADMIN — PANTOPRAZOLE SODIUM 40 MILLIGRAM(S): 20 TABLET, DELAYED RELEASE ORAL at 07:23

## 2021-01-01 RX ADMIN — HEPARIN SODIUM 1000 UNIT(S)/HR: 5000 INJECTION INTRAVENOUS; SUBCUTANEOUS at 13:34

## 2021-01-01 RX ADMIN — Medication 81 MILLIGRAM(S): at 14:13

## 2021-01-01 RX ADMIN — INSULIN GLARGINE 18 UNIT(S): 100 INJECTION, SOLUTION SUBCUTANEOUS at 22:04

## 2021-01-01 RX ADMIN — SODIUM CHLORIDE 1000 MILLILITER(S): 9 INJECTION, SOLUTION INTRAVENOUS at 02:20

## 2021-01-01 RX ADMIN — REMDESIVIR 500 MILLIGRAM(S): 5 INJECTION INTRAVENOUS at 17:17

## 2021-01-01 RX ADMIN — Medication 6 MILLIGRAM(S): at 17:19

## 2021-01-01 RX ADMIN — SENNA PLUS 2 TABLET(S): 8.6 TABLET ORAL at 21:43

## 2021-01-01 RX ADMIN — FENTANYL CITRATE 0.7 MICROGRAM(S)/KG/HR: 50 INJECTION INTRAVENOUS at 19:48

## 2021-01-01 RX ADMIN — CHLORHEXIDINE GLUCONATE 15 MILLILITER(S): 213 SOLUTION TOPICAL at 05:46

## 2021-01-01 RX ADMIN — Medication 12.5 MILLIGRAM(S): at 18:31

## 2021-01-01 RX ADMIN — Medication 500 MILLIGRAM(S): at 11:34

## 2021-01-01 RX ADMIN — ATORVASTATIN CALCIUM 40 MILLIGRAM(S): 80 TABLET, FILM COATED ORAL at 23:07

## 2021-01-01 RX ADMIN — Medication 6: at 11:01

## 2021-01-01 RX ADMIN — Medication 3.3 MICROGRAM(S)/KG/MIN: at 19:47

## 2021-01-01 RX ADMIN — Medication 6: at 17:17

## 2021-01-01 RX ADMIN — Medication 12.5 MILLIGRAM(S): at 17:19

## 2021-01-01 RX ADMIN — Medication 1: at 18:13

## 2021-01-01 RX ADMIN — CLOPIDOGREL BISULFATE 75 MILLIGRAM(S): 75 TABLET, FILM COATED ORAL at 14:15

## 2021-01-01 RX ADMIN — FENTANYL CITRATE 0.7 MICROGRAM(S)/KG/HR: 50 INJECTION INTRAVENOUS at 16:47

## 2021-01-01 RX ADMIN — Medication 50 MILLIGRAM(S): at 06:30

## 2021-01-01 RX ADMIN — Medication 2: at 18:19

## 2021-01-01 RX ADMIN — CHLORHEXIDINE GLUCONATE 15 MILLILITER(S): 213 SOLUTION TOPICAL at 18:07

## 2021-01-01 RX ADMIN — SODIUM CHLORIDE 1 GRAM(S): 9 INJECTION INTRAMUSCULAR; INTRAVENOUS; SUBCUTANEOUS at 05:10

## 2021-01-01 RX ADMIN — SODIUM CHLORIDE 1 GRAM(S): 9 INJECTION INTRAMUSCULAR; INTRAVENOUS; SUBCUTANEOUS at 07:12

## 2021-01-01 RX ADMIN — Medication 250 MILLIGRAM(S): at 10:05

## 2021-01-01 RX ADMIN — Medication 1: at 18:26

## 2021-01-01 RX ADMIN — PIPERACILLIN AND TAZOBACTAM 25 GRAM(S): 4; .5 INJECTION, POWDER, LYOPHILIZED, FOR SOLUTION INTRAVENOUS at 21:43

## 2021-01-01 RX ADMIN — INSULIN GLARGINE 18 UNIT(S): 100 INJECTION, SOLUTION SUBCUTANEOUS at 22:49

## 2021-01-01 RX ADMIN — INSULIN GLARGINE 18 UNIT(S): 100 INJECTION, SOLUTION SUBCUTANEOUS at 22:42

## 2021-01-01 RX ADMIN — Medication 260 MILLIGRAM(S): at 04:13

## 2021-01-01 RX ADMIN — Medication 50 MILLIGRAM(S): at 01:19

## 2021-01-01 RX ADMIN — ENOXAPARIN SODIUM 70 MILLIGRAM(S): 100 INJECTION SUBCUTANEOUS at 00:01

## 2021-01-01 RX ADMIN — Medication 250 MILLIGRAM(S): at 05:04

## 2021-01-01 RX ADMIN — CLOPIDOGREL BISULFATE 75 MILLIGRAM(S): 75 TABLET, FILM COATED ORAL at 11:01

## 2021-01-01 RX ADMIN — Medication 125 MILLILITER(S): at 02:00

## 2021-01-01 RX ADMIN — TAMSULOSIN HYDROCHLORIDE 0.4 MILLIGRAM(S): 0.4 CAPSULE ORAL at 22:36

## 2021-01-01 RX ADMIN — PIPERACILLIN AND TAZOBACTAM 25 GRAM(S): 4; .5 INJECTION, POWDER, LYOPHILIZED, FOR SOLUTION INTRAVENOUS at 22:41

## 2021-01-01 RX ADMIN — Medication 2: at 13:43

## 2021-01-01 RX ADMIN — SODIUM CHLORIDE 1 GRAM(S): 9 INJECTION INTRAMUSCULAR; INTRAVENOUS; SUBCUTANEOUS at 00:01

## 2021-01-01 RX ADMIN — Medication 81 MILLIGRAM(S): at 12:52

## 2021-01-01 RX ADMIN — PIPERACILLIN AND TAZOBACTAM 25 GRAM(S): 4; .5 INJECTION, POWDER, LYOPHILIZED, FOR SOLUTION INTRAVENOUS at 05:40

## 2021-01-01 RX ADMIN — Medication 1: at 14:12

## 2021-01-01 RX ADMIN — SODIUM CHLORIDE 1 GRAM(S): 9 INJECTION INTRAMUSCULAR; INTRAVENOUS; SUBCUTANEOUS at 11:34

## 2021-01-01 RX ADMIN — CLOPIDOGREL BISULFATE 75 MILLIGRAM(S): 75 TABLET, FILM COATED ORAL at 11:34

## 2021-01-01 RX ADMIN — Medication 2: at 10:26

## 2021-01-01 RX ADMIN — ATORVASTATIN CALCIUM 40 MILLIGRAM(S): 80 TABLET, FILM COATED ORAL at 21:29

## 2021-01-01 RX ADMIN — Medication 81 MILLIGRAM(S): at 11:14

## 2021-01-01 RX ADMIN — Medication 3: at 09:27

## 2021-01-01 RX ADMIN — Medication 81 MILLIGRAM(S): at 11:30

## 2021-01-01 RX ADMIN — Medication 12.5 MILLIGRAM(S): at 18:24

## 2021-01-01 RX ADMIN — CLOPIDOGREL BISULFATE 75 MILLIGRAM(S): 75 TABLET, FILM COATED ORAL at 12:47

## 2021-01-01 RX ADMIN — AMIODARONE HYDROCHLORIDE 200 MILLIGRAM(S): 400 TABLET ORAL at 16:47

## 2021-01-01 RX ADMIN — PANTOPRAZOLE SODIUM 40 MILLIGRAM(S): 20 TABLET, DELAYED RELEASE ORAL at 06:07

## 2021-01-01 RX ADMIN — ATORVASTATIN CALCIUM 40 MILLIGRAM(S): 80 TABLET, FILM COATED ORAL at 22:06

## 2021-01-01 RX ADMIN — PANTOPRAZOLE SODIUM 40 MILLIGRAM(S): 20 TABLET, DELAYED RELEASE ORAL at 11:14

## 2021-01-01 RX ADMIN — Medication 12.5 MILLIGRAM(S): at 06:39

## 2021-01-01 RX ADMIN — AMIODARONE HYDROCHLORIDE 618 MILLIGRAM(S): 400 TABLET ORAL at 18:05

## 2021-01-01 RX ADMIN — REMDESIVIR 500 MILLIGRAM(S): 5 INJECTION INTRAVENOUS at 02:51

## 2021-01-01 RX ADMIN — HEPARIN SODIUM 400 UNIT(S)/HR: 5000 INJECTION INTRAVENOUS; SUBCUTANEOUS at 11:40

## 2021-01-01 RX ADMIN — VASOPRESSIN 2.4 UNIT(S)/MIN: 20 INJECTION INTRAVENOUS at 05:40

## 2021-01-01 RX ADMIN — Medication 4: at 12:12

## 2021-01-01 RX ADMIN — CHLORHEXIDINE GLUCONATE 1 APPLICATION(S): 213 SOLUTION TOPICAL at 06:15

## 2021-01-01 RX ADMIN — Medication 3: at 23:21

## 2021-01-01 RX ADMIN — Medication 3.3 MICROGRAM(S)/KG/MIN: at 05:56

## 2021-04-17 NOTE — ED PROVIDER NOTE - PROGRESS NOTE DETAILS
Shane, PGY1: Patient received as sign out, on 15L NRB and max NC. Patient initially reassessed with O2 sat 95%, appears comfortable. On reassessment patient satting 89% appears a slightly more lethargic, responsive to questioning. HFNC ordered, respiratory therapist alerted. Will reassess and admit.

## 2021-04-17 NOTE — ED PROVIDER NOTE - CLINICAL SUMMARY MEDICAL DECISION MAKING FREE TEXT BOX
72yo male p/w 12 days dyspnea, recent positive covid test. 95-97% on NRB and NC, does not appear to have iWOB other than mild tachypnea. Covid labs, CXR, decadron, HFNC if needed and admit.

## 2021-04-17 NOTE — ED ADULT NURSE REASSESSMENT NOTE - NS ED NURSE REASSESS COMMENT FT1
Received report from day shift RN. Patient received A&Ox4, Belarusian speaking with 20G IV. Pt offering no complaints and is in no acute distress at this time.  Respirations even, rate slightly elevated at this time.  Remains on NC and NRB at this time, respiratory at bedside transitioning to HFNC. Stretcher in lowest position, wheels locked, side rails up as per protocol. Vital signs are per flowsheet. Provided urinal at this time. Received report from day shift RN. Patient received A&Ox4, Uzbek speaking with 20G IV.  Kim, ID#[id number]. Pt offering no complaints and is in no acute distress at this time.  Respirations even, rate slightly elevated at this time.  Remains on NC and NRB at this time, respiratory at bedside transitioning to HFNC. Stretcher in lowest position, wheels locked, side rails up as per protocol. Vital signs are per flowsheet. Provided urinal at this time.

## 2021-04-17 NOTE — ED PROVIDER NOTE - ATTENDING CONTRIBUTION TO CARE
Pt is covid+ arrives hypoxic on RA to 60s%, imrpoved on NRB, will admit for oxygen and further management of covid, pt not in resp distress at this time to necessitate intubation and appears comfortable on NRB    Upon my evaluation, this patient had a high probability of imminent or life-threatening deterioration due to concern for covid hypoxemia which required my direct attention, intervention, and personal management.  The patient has a  medical condition that impairs one or more vital organ systems.  Frequent personal assessment and adjustment of medical interventions was performed.       I have personally provided 35 minutes of critical care time exclusive of time spent on separately billable procedures. Time includes review of laboratory data, radiology results, discussion with consultants, patient and family; monitoring for potential decompensation, as well as time spent retrieving data and reviewing the chart and documenting the visit. Interventions were performed as documented above.

## 2021-04-17 NOTE — ED ADULT TRIAGE NOTE - CHIEF COMPLAINT QUOTE
Arrives via EMS with c/o SOB, Covid19 +rapid test 2 days ago.  EMS endorses SOB with POX 53% on room air and 88-89 100% NRB.  Pt speaks minimal English

## 2021-04-17 NOTE — ED ADULT NURSE NOTE - OBJECTIVE STATEMENT
Patient alert and awake, oriented x4, primarily Romanian speaking, arrived due to hypoxia r/t positive covid19. Patient with skin intact, ambulatory, breathing with ease but with SpO2@95% with oxygen NC at 6LPM with non-rebreather 10LPM. Patient with dyspnea on exertion, positive COVID19 test.

## 2021-04-17 NOTE — ED PROVIDER NOTE - OBJECTIVE STATEMENT
70yo male BIBEMs for hypoxia (reported 53% RA) p/w worsening dyspnea since 4/5/21. Denies PMH, chest pain, cough, fever, N/V/D, abdominal pain. Complaining of being thirsty. 70yo male BIBEMs for hypoxia (reported 53% RA) p/w worsening dyspnea since 4/5/21. Denies PMH, chest pain, cough, fever, N/V/D, abdominal pain. Complaining of being thirsty.    Med hx: HTN, HLD, DM, Afib on AC

## 2021-04-17 NOTE — ED PROVIDER NOTE - PHYSICAL EXAMINATION
Physical Exam:  Gen: NAD, AOx3, non-toxic appearing, able to ambulate without assistance  Head: NCAT  HEENT: EOMI, PEERLA, normal conjunctiva, tongue midline, oral mucosa moist  Lun-97% on NRB and NC, mild tachypnea, CTAB, no respiratory distress, no wheezes/rhonchi/rales B/L  CV: RRR, no murmurs, rubs or gallops, distal pulses 2+ b/l  Abd: soft, NT, ND, no guarding, no rigidity, no rebound tenderness, no CVA tenderness   Skin: Warm, well perfused, no rash, no leg swelling  Psych: normal affect, calm

## 2021-04-18 NOTE — H&P ADULT - NEGATIVE GASTROINTESTINAL SYMPTOMS
no nausea/no vomiting/no diarrhea/no constipation no nausea/no vomiting/no diarrhea/no constipation/no abdominal pain/no melena/no hematochezia

## 2021-04-18 NOTE — CONSULT NOTE ADULT - ASSESSMENT
70 y/o M with hx of HTN, HLD, CAD s/p stents, DM type 2 presents with COVID. Patient has been having cough and myalgias since April 5th. He treated for allergies. He then developed SOB 1 week ago. He was tested positive for COVID about 2 days ago. He complains of worsening SOB and his daughter became worried called EMS. He was found to have O2 sat of 53% on RA when EMS arrived. He was placed on NRB. ED course: NRB placed on top of HFNC. His sat improved 100% . Cardiology called for new atrial fibrillation.

## 2021-04-18 NOTE — H&P ADULT - PROBLEM SELECTOR PLAN 2
Plan as above  cont pulse ox  s/p decadron in ED --> cont decadron  Start Remdesvir CXR with b/l patchy opacities.  - s/p decadron in ED --> continue decadron 6 mg daily x9 more days  - start Remdesvir, pt agreeable  - supplemental O2 PRN  - Robitussin PRN for cough  - Albuterol PRN for SOB/wheezing  - Tylenol PRN for fever CXR with b/l patchy opacities.  - s/p decadron in ED --> continue decadron 6 mg daily x9 more days  - start Remdesvir, pt agreeable  - supplemental O2 PRN  - Robitussin PRN for cough  - Albuterol PRN for SOB/wheezing  - Trend inflammatory markers q48 hrs  - Airborne and contact precautions    - Tylenol PRN for fever

## 2021-04-18 NOTE — CONSULT NOTE ADULT - SUBJECTIVE AND OBJECTIVE BOX
HISTORY OF PRESENT ILLNESS:  Patient is a 71y old  Male who presents with a chief complaint of COVID (18 Apr 2021 01:09)    HPI:  72 y/o M with hx of HTN, HLD, CAD s/p stents, DM type 2 presents with COVID. Patient has been having cough and myalgias since April 5th. He treated for allergies. He then developed SOB 1 week ago. He was tested positive for COVID about 2 days ago. He complains of worsening SOB and his daughter became worried called EMS. He was found to have O2 sat of 53% on RA when EMS arrived. He was placed on NRB. ED course: NRB placed on top of HFNC. His sat improved 100% . Cardiology called for new atrial fibrillation. Initial EKG NSR, repeat EKG Atrial fibrillation. Pt denied h/o Afib.   Denies chest pain, abdominal pain, nausea, vomiting, melena, hematochezia, dysuria or calf tenderness.     ED Course: In ER, patient found to have T max , HR , BP , SpO2 . Labs remarkable for Trop ,ck, ckmb 1.5, ckmb index 1.7, pBNP 1314, Ddimer 617, , Ferritin 4663, Procalcitonin 0.59, lact 4.7    Allergies    No Known Allergies    Intolerances    	    MEDICATIONS:  aspirin enteric coated 81 milliGRAM(s) Oral daily  clopidogrel Tablet 75 milliGRAM(s) Oral daily  tamsulosin 0.4 milliGRAM(s) Oral at bedtime  remdesivir  IVPB   IV Intermittent   ALBUTerol    90 MICROgram(s) HFA Inhaler 2 Puff(s) Inhalation every 4 hours PRN  guaiFENesin   Syrup  (Sugar-Free) 100 milliGRAM(s) Oral every 6 hours PRN  acetaminophen   Tablet .. 650 milliGRAM(s) Oral every 4 hours PRN  pantoprazole    Tablet 40 milliGRAM(s) Oral before breakfast  atorvastatin 40 milliGRAM(s) Oral at bedtime  dexAMETHasone  Injectable 6 milliGRAM(s) IV Push every 24 hours  dextrose 40% Gel 15 Gram(s) Oral once  dextrose 50% Injectable 25 Gram(s) IV Push once  dextrose 50% Injectable 12.5 Gram(s) IV Push once  dextrose 50% Injectable 25 Gram(s) IV Push once  glucagon  Injectable 1 milliGRAM(s) IntraMuscular once  insulin lispro (ADMELOG) corrective regimen sliding scale   SubCutaneous three times a day before meals  insulin lispro (ADMELOG) corrective regimen sliding scale   SubCutaneous at bedtime  dextrose 5%. 1000 milliLiter(s) IV Continuous <Continuous>  dextrose 5%. 1000 milliLiter(s) IV Continuous <Continuous>      PAST MEDICAL & SURGICAL HISTORY:  Hypertension  CAD (coronary artery disease)  s/p stents at [facility]  Diabetes mellitus, type 2  Hyperlipidemia  S/P coronary artery stent placement        FAMILY HISTORY:  Family history of diabetes mellitus (Father)        SOCIAL HISTORY:      Smoker: [ ] Active [ ] never  [ ] previous  Alcohol:  [ ] social [ ] daily [ ] never  Lives with:   Occupation:    REVIEW OF SYSTEMS  See HPI. Otherwise, 10 point ROS done and otherwise negative.  >>> <<<    PHYSICAL EXAM:  T(C): 36 (04-17-21 @ 22:21), Max: 37.7 (04-17-21 @ 17:40)  HR: 94 (04-17-21 @ 22:21) (89 - 115)  BP: 109/69 (04-17-21 @ 22:21) (109/69 - 157/70)  RR: 19 (04-17-21 @ 22:21) (19 - 26)  SpO2: 96% (04-17-21 @ 22:21) (89% - 99%)  Wt(kg): --    Appearance: Normal	  HEENT:   Normal oral mucosa, PERRL, EOMI	  Lymphatic: No lymphadenopathy  Cardiovascular: Normal S1 S2, No JVD, No murmurs, No edema  Respiratory: Lungs clear to auscultation	  Psychiatry: A & O x 3, Mood & affect appropriate  Gastrointestinal:  Soft, Non-tender, + BS	  Skin: No rashes, No ecchymoses, No cyanosis	  Neurologic: Non-focal, A&Ox3, nonfocal, VELASQUEZ x 4  Extremities: Normal range of motion, No clubbing, cyanosis or edema  Vascular: Peripheral pulses palpable 2+ bilaterally    I&O's Summary    	 	  LABS:	 	                          13.9   9.29  )-----------( 165      ( 17 Apr 2021 19:13 )             40.8     04-17    135  |  95<L>  |  29<H>  ----------------------------<  281<H>  4.4   |  22  |  0.97    Ca    8.8      17 Apr 2021 19:14    TPro  7.2  /  Alb  3.5  /  TBili  0.7  /  DBili  x   /  AST  55<H>  /  ALT  32  /  AlkPhos  94  04-17  LIVER FUNCTIONS - ( 17 Apr 2021 19:14 )  Alb: 3.5 g/dL / Pro: 7.2 g/dL / ALK PHOS: 94 U/L / ALT: 32 U/L / AST: 55 U/L / GGT: x         proBNP: Serum Pro-Brain Natriuretic Peptide: 1314 pg/mL (04-17 @ 19:15)  Lipid Profile:   HgA1c:   TSH:   CARDIAC MARKERS:  Creatine Kinase, Serum: 88 U/L (04-17 @ 19:15)  Blood Gas Venous - Lactate: 4.7 mmol/L (04-17 @ 19:13)  CAPILLARY BLOOD GLUCOSE                	                 HISTORY OF PRESENT ILLNESS:  Patient is a 71y old  Male who presents with a chief complaint of COVID (18 Apr 2021 01:09)    HPI:  72 y/o Portuguese speaking M (interpretoer # [id number]) with hx of HTN, HLD, CAD s/p stents, DM type 2 presents with COVID. Patient has been having cough and myalgias since April 5th.. He then developed SOB 1 week ago. He was tested positive for COVID about 2 days ago. He complains of worsening SOB and his daughter became worried called EMS. He was found to have O2 sat of 53% on RA when EMS arrived. He was placed on NRB. ED course: NRB placed on top of HFNC. His sat improved 100% . Cardiology called for new atrial fibrillation. Initial EKG NSR, repeat EKG Atrial fibrillation. Pt denied h/o Afib or Coumadin.  Denies chest pain, palpitations, syncope, near syncope, abdominal pain, nausea, vomiting, melena, dysuria or calf tenderness.  He received first covid vaccine March 28    ED Course: In ER, patient found to have T max , HR 89 - 106 , /69 - 157/70 , SpO2 96% on NRB over HFNC . resp 20. Labs remarkable for Trop ,ck, ckmb 1.5, ckmb index 1.7, pBNP 1314, Ddimer 617, , Ferritin 4663, Procalcitonin 0.59, lact 4.7    Allergies    No Known Allergies    Intolerances    	    MEDICATIONS:  aspirin enteric coated 81 milliGRAM(s) Oral daily  clopidogrel Tablet 75 milliGRAM(s) Oral daily  tamsulosin 0.4 milliGRAM(s) Oral at bedtime  remdesivir  IVPB   IV Intermittent   ALBUTerol    90 MICROgram(s) HFA Inhaler 2 Puff(s) Inhalation every 4 hours PRN  guaiFENesin   Syrup  (Sugar-Free) 100 milliGRAM(s) Oral every 6 hours PRN  acetaminophen   Tablet .. 650 milliGRAM(s) Oral every 4 hours PRN  pantoprazole    Tablet 40 milliGRAM(s) Oral before breakfast  atorvastatin 40 milliGRAM(s) Oral at bedtime  dexAMETHasone  Injectable 6 milliGRAM(s) IV Push every 24 hours  dextrose 40% Gel 15 Gram(s) Oral once  dextrose 50% Injectable 25 Gram(s) IV Push once  dextrose 50% Injectable 12.5 Gram(s) IV Push once  dextrose 50% Injectable 25 Gram(s) IV Push once  glucagon  Injectable 1 milliGRAM(s) IntraMuscular once  insulin lispro (ADMELOG) corrective regimen sliding scale   SubCutaneous three times a day before meals  insulin lispro (ADMELOG) corrective regimen sliding scale   SubCutaneous at bedtime  dextrose 5%. 1000 milliLiter(s) IV Continuous <Continuous>  dextrose 5%. 1000 milliLiter(s) IV Continuous <Continuous>      PAST MEDICAL & SURGICAL HISTORY:  Hypertension  CAD (coronary artery disease)  s/p stents at [facility]  Diabetes mellitus, type 2  Hyperlipidemia  S/P coronary artery stent placement        FAMILY HISTORY:  Family history of diabetes mellitus (Father)      REVIEW OF SYSTEMS  See HPI. Otherwise, 10 point ROS done and otherwise negative.  >>> <<<    PHYSICAL EXAM:  T(C): 36 (04-17-21 @ 22:21), Max: 37.7 (04-17-21 @ 17:40)  HR: 94 (04-17-21 @ 22:21) (89 - 115)  BP: 109/69 (04-17-21 @ 22:21) (109/69 - 157/70)  RR: 19 (04-17-21 @ 22:21) (19 - 26)  SpO2: 96% (04-17-21 @ 22:21) (89% - 99%)  Wt(kg): --    Appearance: Normal on NRB  Cardiovascular: irr/irr S1 S2, No JVD, No murmurs, No edema  Respiratory: Lungs clear to auscultation	  Psychiatry: A & O x 3, Mood & affect appropriate  Gastrointestinal:  Soft, Non-tender, + BS	  Skin: No rashes, No ecchymoses, No cyanosis	  Neurologic: Non-focal, A&Ox3, nonfocal, VELASQUEZ x 4  Extremities: Normal range of motion, No clubbing, cyanosis or edema  Vascular: Peripheral pulses palpable 2+ bilaterally    I&O's Summary    	 	  LABS:	 	                          13.9   9.29  )-----------( 165      ( 17 Apr 2021 19:13 )             40.8     04-17    135  |  95<L>  |  29<H>  ----------------------------<  281<H>  4.4   |  22  |  0.97    Ca    8.8      17 Apr 2021 19:14    TPro  7.2  /  Alb  3.5  /  TBili  0.7  /  DBili  x   /  AST  55<H>  /  ALT  32  /  AlkPhos  94  04-17  LIVER FUNCTIONS - ( 17 Apr 2021 19:14 )  Alb: 3.5 g/dL / Pro: 7.2 g/dL / ALK PHOS: 94 U/L / ALT: 32 U/L / AST: 55 U/L / GGT: x         proBNP: Serum Pro-Brain Natriuretic Peptide: 1314 pg/mL (04-17 @ 19:15)  Lipid Profile:   HgA1c:   TSH:   CARDIAC MARKERS:  Creatine Kinase, Serum: 88 U/L (04-17 @ 19:15)  Blood Gas Venous - Lactate: 4.7 mmol/L (04-17 @ 19:13)  CAPILLARY BLOOD GLUCOSE                	                 HISTORY OF PRESENT ILLNESS:  Patient is a 71y old  Male who presents with a chief complaint of COVID (18 Apr 2021 01:09)    HPI:  72 y/o Maltese speaking M (interpretoer # [id number]) with hx of HTN, HLD, CAD s/p stents, DM type 2 presents with COVID. Patient has been having cough and myalgias since April 5th.. He then developed SOB 1 week ago. He was tested positive for COVID about 2 days ago. He complains of worsening SOB and his daughter became worried called EMS. He was found to have O2 sat of 53% on RA when EMS arrived. He was placed on NRB. ED course: NRB placed on top of HFNC. His sat improved 100% . Cardiology called for new atrial fibrillation. Initial EKG NSR, repeat EKG Atrial fibrillation. Pt denied h/o Afib or Coumadin.  Denies chest pain, palpitations, syncope, near syncope, abdominal pain, nausea, vomiting, melena, dysuria or calf tenderness.  He received first covid vaccine March 28    ED Course: In ER, patient found to have T max , HR 89 - 106 , /69 - 157/70 , SpO2 96% on NRB over HFNC . resp 20. Labs remarkable for Trop 118 ,ck, ckmb 1.5, ckmb index 1.7, pBNP 1314, Ddimer 617, , Ferritin 4663, Procalcitonin 0.59, lact 4.7    Allergies    No Known Allergies    Intolerances    	    MEDICATIONS:  aspirin enteric coated 81 milliGRAM(s) Oral daily  clopidogrel Tablet 75 milliGRAM(s) Oral daily  tamsulosin 0.4 milliGRAM(s) Oral at bedtime  remdesivir  IVPB   IV Intermittent   ALBUTerol    90 MICROgram(s) HFA Inhaler 2 Puff(s) Inhalation every 4 hours PRN  guaiFENesin   Syrup  (Sugar-Free) 100 milliGRAM(s) Oral every 6 hours PRN  acetaminophen   Tablet .. 650 milliGRAM(s) Oral every 4 hours PRN  pantoprazole    Tablet 40 milliGRAM(s) Oral before breakfast  atorvastatin 40 milliGRAM(s) Oral at bedtime  dexAMETHasone  Injectable 6 milliGRAM(s) IV Push every 24 hours  dextrose 40% Gel 15 Gram(s) Oral once  dextrose 50% Injectable 25 Gram(s) IV Push once  dextrose 50% Injectable 12.5 Gram(s) IV Push once  dextrose 50% Injectable 25 Gram(s) IV Push once  glucagon  Injectable 1 milliGRAM(s) IntraMuscular once  insulin lispro (ADMELOG) corrective regimen sliding scale   SubCutaneous three times a day before meals  insulin lispro (ADMELOG) corrective regimen sliding scale   SubCutaneous at bedtime  dextrose 5%. 1000 milliLiter(s) IV Continuous <Continuous>  dextrose 5%. 1000 milliLiter(s) IV Continuous <Continuous>      PAST MEDICAL & SURGICAL HISTORY:  Hypertension  CAD (coronary artery disease)  s/p stents at [facility]  Diabetes mellitus, type 2  Hyperlipidemia  S/P coronary artery stent placement        FAMILY HISTORY:  Family history of diabetes mellitus (Father)      REVIEW OF SYSTEMS  See HPI. Otherwise, 10 point ROS done and otherwise negative.  >>> <<<    PHYSICAL EXAM:  T(C): 36 (04-17-21 @ 22:21), Max: 37.7 (04-17-21 @ 17:40)  HR: 94 (04-17-21 @ 22:21) (89 - 115)  BP: 109/69 (04-17-21 @ 22:21) (109/69 - 157/70)  RR: 19 (04-17-21 @ 22:21) (19 - 26)  SpO2: 96% (04-17-21 @ 22:21) (89% - 99%)  Wt(kg): --    Appearance: Normal on NRB  Cardiovascular: irr/irr S1 S2, No JVD, No murmurs, No edema  Respiratory: Lungs clear to auscultation	  Psychiatry: A & O x 3, Mood & affect appropriate  Gastrointestinal:  Soft, Non-tender, + BS	  Skin: No rashes, No ecchymoses, No cyanosis	  Neurologic: Non-focal, A&Ox3, nonfocal, VELASQUEZ x 4  Extremities: Normal range of motion, No clubbing, cyanosis or edema  Vascular: Peripheral pulses palpable 2+ bilaterally    I&O's Summary    	 	  LABS:	 	                          13.9   9.29  )-----------( 165      ( 17 Apr 2021 19:13 )             40.8     04-17    135  |  95<L>  |  29<H>  ----------------------------<  281<H>  4.4   |  22  |  0.97    Ca    8.8      17 Apr 2021 19:14    TPro  7.2  /  Alb  3.5  /  TBili  0.7  /  DBili  x   /  AST  55<H>  /  ALT  32  /  AlkPhos  94  04-17  LIVER FUNCTIONS - ( 17 Apr 2021 19:14 )  Alb: 3.5 g/dL / Pro: 7.2 g/dL / ALK PHOS: 94 U/L / ALT: 32 U/L / AST: 55 U/L / GGT: x         proBNP: Serum Pro-Brain Natriuretic Peptide: 1314 pg/mL (04-17 @ 19:15)  Lipid Profile:   HgA1c:   TSH:   CARDIAC MARKERS:  Creatine Kinase, Serum: 88 U/L (04-17 @ 19:15)  Blood Gas Venous - Lactate: 4.7 mmol/L (04-17 @ 19:13)  CAPILLARY BLOOD GLUCOSE

## 2021-04-18 NOTE — PROGRESS NOTE ADULT - PROBLEM SELECTOR PLAN 5
- C/w atorvastatin - On Metformin and Jardiance at home.  - Hold oral hypoglycemics.  - Start ISS and FS checks qAC and qhs  - F/u A1c

## 2021-04-18 NOTE — PROGRESS NOTE ADULT - PROBLEM SELECTOR PLAN 2
CXR with b/l patchy opacities.  - s/p decadron in ED --> continue decadron 6 mg daily x9 more days  - Start Remdesvir, pt agreeable  - supplemental O2 PRN  - Robitussin PRN for cough  - Albuterol PRN for SOB/wheezing  - Trend inflammatory markers q48 hrs  - Airborne and contact precautions    - Tylenol PRN for fever - CXR with b/l patchy opacities.  - Continue O2 supplementation via HFNC + NRB. Wean as tolerated.   - Continue Decadron 6 mg daily x9 more days 4/17- 4/26.   - Start Remdesvir, pt agreeable. 4/17- 4/21, can extend x5 days if not improved.  - Consider candidacy for Toci if not clinically improving.   - Robitussin PRN for cough  - Albuterol PRN for SOB/wheezing  - Trend inflammatory markers q48 hrs, repeat in AM.   - Airborne and contact precautions  - On full dose AC for Afib, also pending CTA to rule out acute PE.   - Tylenol PRN for fever

## 2021-04-18 NOTE — H&P ADULT - NSICDXFAMILYHX_GEN_ALL_CORE_FT
FAMILY HISTORY:  Father  Still living? Unknown  Family history of diabetes mellitus, Age at diagnosis: Age Unknown

## 2021-04-18 NOTE — ED ADULT NURSE REASSESSMENT NOTE - NS ED NURSE REASSESS COMMENT FT1
Received report from previous shift RN, pt currently being transported on non-rebreather mask as per RT with pulse ox to 6T. Received critical notification from lab, trop 118. MELISSA Phan aware. Pt stable at this time.

## 2021-04-18 NOTE — PROGRESS NOTE ADULT - PROBLEM SELECTOR PLAN 6
On Metformin and Jardiance at home.  - Hold oral hypoglycemics  - Start ISS and FS checks qAC and qhs  - F/u A1c - Given patient with hx of CAD on ASA and Plavix. Started on AC for afib.  - Need to confirm with patient's Cardiologist whether he should be still taking Plavix. He had stents placed in 2011.  - C/w ASA and Plavix for now. Continue Statin.

## 2021-04-18 NOTE — H&P ADULT - NEGATIVE RESPIRATORY AND THORAX SYMPTOMS
no wheezing/no cough/no hemoptysis/no pleuritic chest pain no wheezing/no hemoptysis/no pleuritic chest pain

## 2021-04-18 NOTE — H&P ADULT - PROBLEM SELECTOR PLAN 8
1.  Name of PCP:  2.  PCP Contacted on Admission: [ ] Y    [ ] N    3.  PCP contacted at Discharge: [ ] Y    [ ] N    [ ] N/A  4.  Post-Discharge Appointment Date and Location:  5.  Summary of Handoff given to PCP: Will hold amlodipine now. monitor BP  dash diet BPs relatively soft.   - Hold amlodipine for now

## 2021-04-18 NOTE — H&P ADULT - PROBLEM SELECTOR PLAN 4
cont statin Patient with Trop: 119, CK neg  patient denies chest pain, but patient with new afib  Given COVID status, elevated pro bnp --> herb obtain CTA chest   cards consulted  will also start hep gtt when PTT results Patient with Trop: 119, CK neg  patient denies chest pain, but patient with new afib  Given COVID status, elevated pro bnp --> herb obtain CTA chest   cards consulted  Hep gtt started Patient with Trop: 119, CK negative.  Patient denies chest pain, but patient with new afib.  - Given COVID status and elevated pro-BNP with tachycardia and tachypnea --> herb obtain CTA chest with IV to r/o PE.  - Cardiology consulted  - Hep gtt started  - F/u TTE

## 2021-04-18 NOTE — H&P ADULT - NSICDXPASTMEDICALHX_GEN_ALL_CORE_FT
PAST MEDICAL HISTORY:  CAD (coronary artery disease) s/p stents at [facility]    Diabetes mellitus, type 2     Hyperlipidemia     Hypertension

## 2021-04-18 NOTE — H&P ADULT - NSHPSOCIALHISTORY_GEN_ALL_CORE
Denies drug use  previous smoker: Quit 20 year ago  previous alcohol use; Quit 20 years ago    lives with wife    Retired Denies illicit drug use  Previous tobacco smoker: Quit >20 years ago  Previous alcohol use: Quit >20 years ago    Lives with wife    Retired

## 2021-04-18 NOTE — H&P ADULT - PROBLEM SELECTOR PLAN 1
Admit   check cbc, bmp, a1c, flp, tsh, trend CE, covid inflammatory markers  Patient was 53% on RA at home --> 89-95% on non-re breather --> upgraded to 100% HFNC. He is satting 100% with HFNC with non re-breather.   cont pulse ox  s/p decadron in ED --> cont decadron  Start Remdesvir Patient was 53% on RA at home --> 89-95% on 100% non-rebreather --> upgraded to 100% HFNC. Currently, he is satting 100% with HFNC with non-rebreather.   - continue pulse ox  - plan as below for Pneumonia

## 2021-04-18 NOTE — H&P ADULT - NEGATIVE NEUROLOGICAL SYMPTOMS
no transient paralysis/no weakness no transient paralysis/no paresthesias/no generalized seizures/no focal seizures/no syncope/no loss of sensation/no loss of consciousness/no facial palsy

## 2021-04-18 NOTE — H&P ADULT - ATTENDING COMMENTS
72 y/o M with hx of HTN, HLD, CAD s/p stents [2011], DM type 2 presents with SOB in setting of recent diagnosis of COVID as outpatient. Hypoxic to 80s on NRB, now on HFNC. Will start Decadron and Remdesivir. Also with possible new-onset afib and elevated trops. Cards eval obtained, will start hep gtt for afib. TTE pending.

## 2021-04-18 NOTE — H&P ADULT - NSHPPHYSICALEXAM_GEN_ALL_CORE
Vital Signs Last 24 Hrs  T(C): 36.6 (18 Apr 2021 09:36), Max: 37.7 (17 Apr 2021 17:40)  T(F): 97.9 (18 Apr 2021 09:36), Max: 99.8 (17 Apr 2021 17:40)  HR: 81 (18 Apr 2021 09:36) (74 - 115)  BP: 129/69 (18 Apr 2021 09:36) (109/69 - 157/70)  BP(mean): --  RR: 19 (18 Apr 2021 09:36) (18 - 26)  SpO2: 96% (18 Apr 2021 09:36) (89% - 99%)

## 2021-04-18 NOTE — CONSULT NOTE ADULT - PROBLEM SELECTOR RECOMMENDATION 2
Elevated troponin with normal ck/ckmb in the setting of covid infection. Trop 118 ,ck, ckmb 1.5, ckmb index 1.7, pBNP 1314,   EKG afib, HR 96, no ischemic changes. Denies chest pain  Plan   - No need to treat for ACS unless clinical story changes or patient develops chest pain, ekg changes, rising trops.   - Serial EKG, PRN chest pain  - repeat troponin, ck, ckmb  - risk factor profile to include TSH, HGBA1c, Lipid profile,   - Echo to evaluate LV function and wall motion abnormality  - Treat for CAD ( beta blocker, Acei/Arb, statin, risk modification)

## 2021-04-18 NOTE — H&P ADULT - NSHPLABSRESULTS_GEN_ALL_CORE
EKG NSR 89 TWI in AVR, Flat T in AVL, QTC: 457  Repeat EKG: afib 96 TWI in V1, AVR Flat T in AVL                          13.9   9.29  )-----------( 165      ( 17 Apr 2021 19:13 )             40.8       04-17    135  |  95<L>  |  29<H>  ----------------------------<  281<H>  4.4   |  22  |  0.97    Ca    8.8      17 Apr 2021 19:14    TPro  7.2  /  Alb  3.5  /  TBili  0.7  /  DBili  x   /  AST  55<H>  /  ALT  32  /  AlkPhos  94  04-17      < from: Xray Chest 1 View- PORTABLE-Urgent (04.17.21 @ 18:12) >    ******PRELIMINARY REPORT******    ******PRELIMINARY REPORT******            INTERPRETATION:  Extensive bilateral primarily peripheral hazy airspace opacities greater on the right than the left concerning for atypical infection including Covid 19.    < end of copied text > EKG personally reviewed.  Initial EKG: NSR 89 TWI in AVR, Flat T in AVL, QTC: 457  Repeat EKG: afib 96 TWI in V1, AVR Flat T in AVL                          13.9   9.29  )-----------( 165      ( 17 Apr 2021 19:13 )             40.8       04-17    135  |  95<L>  |  29<H>  ----------------------------<  281<H>  4.4   |  22  |  0.97    Ca    8.8      17 Apr 2021 19:14    TPro  7.2  /  Alb  3.5  /  TBili  0.7  /  DBili  x   /  AST  55<H>  /  ALT  32  /  AlkPhos  94  04-17    Imaging personally reviewed.  < from: Xray Chest 1 View- PORTABLE-Urgent (04.17.21 @ 18:12) >    ******PRELIMINARY REPORT******    ******PRELIMINARY REPORT******            INTERPRETATION:  Extensive bilateral primarily peripheral hazy airspace opacities greater on the right than the left concerning for atypical infection including Covid 19.    < end of copied text >

## 2021-04-18 NOTE — H&P ADULT - ASSESSMENT
72 y/o M with hx of HTN, HLD, CAD s/p stents, DM type 2 presents with COVID. 72 y/o M with hx of HTN, HLD, CAD s/p stents [2011, on ASA and Plavix], DM type 2 presents with SOB and hypoxia. Admitted for acute hypoxic respiratory failure 2/2 PNA likely 2/2 COVID-19.

## 2021-04-18 NOTE — PROGRESS NOTE ADULT - PROBLEM SELECTOR PLAN 4
Patient with Trop: 119, CK negative.  Patient denies chest pain, but patient with new afib.  - Given COVID status and elevated pro-BNP with tachycardia and tachypnea --> herb obtain CTA chest with IV to r/o PE.  - Cardiology consulted  - Hep gtt started  - F/u TTE - Patient with Trop: 119, CK negative. Not likely ACS per Cardio.  - Patient denies chest pain, but patient with new afib.  - Given COVID status and elevated pro-BNP with tachycardia and tachypnea --> herb obtain CTA chest with IV to r/o PE. On full dose AC.   - Cardiology consulted, appreciate recommendations.   - F/u TTE to evaluate for underlying LV thrombus, any wall motion/valvular defects.

## 2021-04-18 NOTE — PROGRESS NOTE ADULT - SUBJECTIVE AND OBJECTIVE BOX
PROGRESS NOTE:     Patient is a 71y old  Male who presents with a chief complaint of COVID (18 Apr 2021 01:55)    SUBJECTIVE / OVERNIGHT EVENTS: Patient seen and evaluated at bedside. No acute distress evident, no overnight events. Reports feeling better than when he came in, still SOB, on HFNC, comfortable appearing. Reports no CP, N/V, dizziness, palpitations.     ADDITIONAL REVIEW OF SYSTEMS:    MEDICATIONS  (STANDING):  aspirin enteric coated 81 milliGRAM(s) Oral daily  atorvastatin 40 milliGRAM(s) Oral at bedtime  clopidogrel Tablet 75 milliGRAM(s) Oral daily  dexAMETHasone  Injectable 6 milliGRAM(s) IV Push every 24 hours  dextrose 40% Gel 15 Gram(s) Oral once  dextrose 5%. 1000 milliLiter(s) (50 mL/Hr) IV Continuous <Continuous>  dextrose 5%. 1000 milliLiter(s) (100 mL/Hr) IV Continuous <Continuous>  dextrose 50% Injectable 25 Gram(s) IV Push once  dextrose 50% Injectable 12.5 Gram(s) IV Push once  dextrose 50% Injectable 25 Gram(s) IV Push once  glucagon  Injectable 1 milliGRAM(s) IntraMuscular once  heparin   Injectable 5500 Unit(s) IV Push once  heparin  Infusion.  Unit(s)/Hr (12 mL/Hr) IV Continuous <Continuous>  insulin lispro (ADMELOG) corrective regimen sliding scale   SubCutaneous three times a day before meals  insulin lispro (ADMELOG) corrective regimen sliding scale   SubCutaneous at bedtime  pantoprazole    Tablet 40 milliGRAM(s) Oral before breakfast  remdesivir  IVPB   IV Intermittent   tamsulosin 0.4 milliGRAM(s) Oral at bedtime    MEDICATIONS  (PRN):  acetaminophen   Tablet .. 650 milliGRAM(s) Oral every 4 hours PRN Temp greater or equal to 38.5C (101.3F)  ALBUTerol    90 MICROgram(s) HFA Inhaler 2 Puff(s) Inhalation every 4 hours PRN Shortness of Breath and/or Wheezing  guaiFENesin   Syrup  (Sugar-Free) 100 milliGRAM(s) Oral every 6 hours PRN Cough  heparin   Injectable 5500 Unit(s) IV Push every 6 hours PRN For aPTT less than 40  heparin   Injectable 2500 Unit(s) IV Push every 6 hours PRN For aPTT between 40 - 57    CAPILLARY BLOOD GLUCOSE  POCT Blood Glucose.: 182 mg/dL (18 Apr 2021 13:04)  POCT Blood Glucose.: 203 mg/dL (18 Apr 2021 09:34)  POCT Blood Glucose.: 222 mg/dL (18 Apr 2021 02:53)    I&O's Summary    PHYSICAL EXAM:  Vital Signs Last 24 Hrs  T(C): 36.6 (18 Apr 2021 09:36), Max: 37.7 (17 Apr 2021 17:40)  T(F): 97.9 (18 Apr 2021 09:36), Max: 99.8 (17 Apr 2021 17:40)  HR: 81 (18 Apr 2021 09:36) (74 - 115)  BP: 129/69 (18 Apr 2021 09:36) (109/69 - 157/70)  BP(mean): --  RR: 19 (18 Apr 2021 09:36) (18 - 26)  SpO2: 96% (18 Apr 2021 09:36) (89% - 99%)    CONSTITUTIONAL: NAD, well-developed, comfortable appearing on HFNC+  RESPIRATORY: Normal respiratory effort; lungs are clear to auscultation bilaterally  CARDIOVASCULAR: Regular rate and rhythm, normal S1 and S2  No lower extremity edema; Peripheral pulses are 2+ bilaterally  ABDOMEN: Nontender to palpation, normoactive bowel sounds,   MUSCLOSKELETAL: no clubbing or cyanosis of digits; no joint swelling or tenderness to palpation  PSYCH: A+O to person, place, and time; affect appropriate    LABS: reviewed                         13.6   8.97  )-----------( 155      ( 18 Apr 2021 11:33 )             41.1     04-18    136  |  101  |  32<H>  ----------------------------<  249<H>  4.5   |  21<L>  |  0.75    Ca    8.3<L>      18 Apr 2021 03:17  Phos  4.3     04-18  Mg     2.9     04-18    TPro  7.2  /  Alb  3.5  /  TBili  0.7  /  DBili  x   /  AST  55<H>  /  ALT  32  /  AlkPhos  94  04-17    PT/INR - ( 18 Apr 2021 03:17 )   PT: 11.8 sec;   INR: 1.04 ratio      PTT - ( 18 Apr 2021 11:33 )  PTT:161.1 sec  CARDIAC MARKERS ( 18 Apr 2021 03:17 )  x     / x     / 130 U/L / x     / 2.8 ng/mL  CARDIAC MARKERS ( 17 Apr 2021 19:15 )  x     / x     / 88 U/L / x     / 1.5 ng/mL    RADIOLOGY & ADDITIONAL TESTS:  Results Reviewed:   Imaging Personally Reviewed:  Electrocardiogram Personally Reviewed:    COORDINATION OF CARE:  Care Discussed with Consultants/Other Providers [Y/N]:  Prior or Outpatient Records Reviewed [Y/N]:   PROGRESS NOTE:     Patient is a 71y old  Male who presents with a chief complaint of COVID (18 Apr 2021 01:55)    SUBJECTIVE / OVERNIGHT EVENTS: Patient seen and evaluated at bedside. No acute distress evident, no overnight events. Reports feeling better than when he came in, still SOB, on HFNC, comfortable appearing. Reports no CP, N/V, dizziness, palpitations.     ADDITIONAL REVIEW OF SYSTEMS:    MEDICATIONS  (STANDING):  aspirin enteric coated 81 milliGRAM(s) Oral daily  atorvastatin 40 milliGRAM(s) Oral at bedtime  clopidogrel Tablet 75 milliGRAM(s) Oral daily  dexAMETHasone  Injectable 6 milliGRAM(s) IV Push every 24 hours  dextrose 40% Gel 15 Gram(s) Oral once  dextrose 5%. 1000 milliLiter(s) (50 mL/Hr) IV Continuous <Continuous>  dextrose 5%. 1000 milliLiter(s) (100 mL/Hr) IV Continuous <Continuous>  dextrose 50% Injectable 25 Gram(s) IV Push once  dextrose 50% Injectable 12.5 Gram(s) IV Push once  dextrose 50% Injectable 25 Gram(s) IV Push once  glucagon  Injectable 1 milliGRAM(s) IntraMuscular once  heparin   Injectable 5500 Unit(s) IV Push once  heparin  Infusion.  Unit(s)/Hr (12 mL/Hr) IV Continuous <Continuous>  insulin lispro (ADMELOG) corrective regimen sliding scale   SubCutaneous three times a day before meals  insulin lispro (ADMELOG) corrective regimen sliding scale   SubCutaneous at bedtime  pantoprazole    Tablet 40 milliGRAM(s) Oral before breakfast  remdesivir  IVPB   IV Intermittent   tamsulosin 0.4 milliGRAM(s) Oral at bedtime    MEDICATIONS  (PRN):  acetaminophen   Tablet .. 650 milliGRAM(s) Oral every 4 hours PRN Temp greater or equal to 38.5C (101.3F)  ALBUTerol    90 MICROgram(s) HFA Inhaler 2 Puff(s) Inhalation every 4 hours PRN Shortness of Breath and/or Wheezing  guaiFENesin   Syrup  (Sugar-Free) 100 milliGRAM(s) Oral every 6 hours PRN Cough  heparin   Injectable 5500 Unit(s) IV Push every 6 hours PRN For aPTT less than 40  heparin   Injectable 2500 Unit(s) IV Push every 6 hours PRN For aPTT between 40 - 57    CAPILLARY BLOOD GLUCOSE  POCT Blood Glucose.: 182 mg/dL (18 Apr 2021 13:04)  POCT Blood Glucose.: 203 mg/dL (18 Apr 2021 09:34)  POCT Blood Glucose.: 222 mg/dL (18 Apr 2021 02:53)    I&O's Summary    PHYSICAL EXAM:  Vital Signs Last 24 Hrs  T(C): 36.6 (18 Apr 2021 09:36), Max: 37.7 (17 Apr 2021 17:40)  T(F): 97.9 (18 Apr 2021 09:36), Max: 99.8 (17 Apr 2021 17:40)  HR: 81 (18 Apr 2021 09:36) (74 - 115)  BP: 129/69 (18 Apr 2021 09:36) (109/69 - 157/70)  BP(mean): --  RR: 19 (18 Apr 2021 09:36) (18 - 26)  SpO2: 96% (18 Apr 2021 09:36) (89% - 99%)    CONSTITUTIONAL: NAD, well-developed, comfortable appearing on HFNC+, not tachypneic.  RESPIRATORY: Normal respiratory effort; lungs are clear to auscultation bilaterally  CARDIOVASCULAR: Regular rate and rhythm, normal S1 and S2  No lower extremity edema; Peripheral pulses are 2+ bilaterally  ABDOMEN: Nontender to palpation, normoactive bowel sounds,   MUSCLOSKELETAL: no clubbing or cyanosis of digits; no joint swelling or tenderness to palpation  PSYCH: A+O to person, place, and time; affect appropriate    LABS: reviewed                         13.6   8.97  )-----------( 155      ( 18 Apr 2021 11:33 )             41.1     04-18    136  |  101  |  32<H>  ----------------------------<  249<H>  4.5   |  21<L>  |  0.75    Ca    8.3<L>      18 Apr 2021 03:17  Phos  4.3     04-18  Mg     2.9     04-18    TPro  7.2  /  Alb  3.5  /  TBili  0.7  /  DBili  x   /  AST  55<H>  /  ALT  32  /  AlkPhos  94  04-17    PT/INR - ( 18 Apr 2021 03:17 )   PT: 11.8 sec;   INR: 1.04 ratio      PTT - ( 18 Apr 2021 11:33 )  PTT:161.1 sec  CARDIAC MARKERS ( 18 Apr 2021 03:17 )  x     / x     / 130 U/L / x     / 2.8 ng/mL  CARDIAC MARKERS ( 17 Apr 2021 19:15 )  x     / x     / 88 U/L / x     / 1.5 ng/mL    RADIOLOGY & ADDITIONAL TESTS:  Results Reviewed:   Imaging Personally Reviewed:  Electrocardiogram Personally Reviewed:    COORDINATION OF CARE:  Care Discussed with Consultants/Other Providers [Y/N]:  Prior or Outpatient Records Reviewed [Y/N]:

## 2021-04-18 NOTE — PROGRESS NOTE ADULT - PROBLEM SELECTOR PLAN 7
Given patient with hx of CAD on ASA and Plavix. Patient is also being started on AC for afib.  - Need to confirm with patient's cardiologist whether he should be still taking Plavix. He had stents placed in 2011.  - C/w ASA and Plavix for now - BPs relatively soft.   - Hold Amlodipine for now.

## 2021-04-18 NOTE — PROGRESS NOTE ADULT - PROBLEM SELECTOR PLAN 3
Per patient, no previous hx of afib. Patient was in sinus tachycardia/NSR when he initially came to ED. Patient then converted to afib.   HR: 90s-100s.   [id number] score of 4  - Hep gtt started  - Now rate controlled, will hold off on rate-controlling agent for now given pt's relatively soft BPs on admission  - Monitor on tele  - Cardiology consult obtained Per patient, no previous hx of afib. Patient was in sinus tachycardia/NSR when he initially came to ED. Patient then converted to afib. HR: 90s-100s.   [id number] score of 4, therefore started on full dose AC.   - Hep gtt started, will transition to NOAC when stable.   - Started low dose Lopressor 12.5mg q12 , with HOLD parameters.   - Monitor on telemetry.   - Cardiology consult obtained, appreciate recommendations.

## 2021-04-18 NOTE — H&P ADULT - PROBLEM SELECTOR PLAN 3
Per patient, no previous hx of afib. patient was in sinus tachycardia/ NSR when he initially came to ED. Patient then converted to afib.   HR: 90s-100s.   [id number] score of 4 Per patient, no previous hx of afib. patient was in sinus tachycardia/ NSR when he initially came to ED. Patient then converted to afib.   HR: 90s-100s.   [id number] score of 4  Hep gtt to be started PTT results Per patient, no previous hx of afib. patient was in sinus tachycardia/ NSR when he initially came to ED. Patient then converted to afib.   HR: 90s-100s.   [id number] score of 4  Hep gtt started Per patient, no previous hx of afib. Patient was in sinus tachycardia/NSR when he initially came to ED. Patient then converted to afib.   HR: 90s-100s.   [id number] score of 4  - Hep gtt started  - Now rate controlled, will hold off on rate-controlling agent for now given pt's relatively soft BPs on admission  - Monitor on tele  - Cardiology consult obtained

## 2021-04-18 NOTE — PROGRESS NOTE ADULT - ASSESSMENT
A 72 y/o M with hx of HTN, HLD, CAD s/p stents [2011, on ASA and Plavix], DM type 2 presents with SOB and hypoxia. Admitted for acute hypoxic respiratory failure 2/2 PNA likely 2/2 COVID-19.

## 2021-04-18 NOTE — H&P ADULT - NEGATIVE GENERAL GENITOURINARY SYMPTOMS
no hematuria/no renal colic/no flank pain L/no flank pain R no hematuria/no renal colic/no flank pain L/no flank pain R/no dysuria

## 2021-04-18 NOTE — H&P ADULT - NEGATIVE ENMT SYMPTOMS
no hearing difficulty/no ear pain/no tinnitus no hearing difficulty/no ear pain/no tinnitus/no nasal congestion/no nasal discharge/no throat pain/no dysphagia

## 2021-04-18 NOTE — CONSULT NOTE ADULT - PROBLEM SELECTOR RECOMMENDATION 9
New onset atrial fibrillation. Initial EKG NSR, repeat EKG Atrial fibrillation. Pt denied h/o Afib.  CHADSVASC score:  3  Has Bled score: 2  Monitor on telemetry  Rate control Metoprolol   Heparin gtt full anticoagulation  Admission labs include serial cardiac enzymes, risk factor profile to include TSH, HGBA1c, Lipid profile,   EKG, PRN chest pain  Echo in AM to R/O thrombus and evaluate LV function and wall motion abnormality New onset atrial fibrillation. Initial EKG NSR, repeat EKG Atrial fibrillation. Pt denied h/o Afib or coumadin use.  CHADSVASC score:  3  Has Bled score: 2  Monitor on telemetry  Rate control low dose Metoprolol 12.5 BID and up titrate as tolerated  Heparin gtt full anticoagulation  Admission labs include serial cardiac enzymes, risk factor profile to include TSH, HGBA1c, Lipid profile,   EKG, PRN chest pain  Echo in AM to R/O thrombus and evaluate LV function and wall motion abnormality

## 2021-04-18 NOTE — H&P ADULT - PROBLEM SELECTOR PLAN 6
cont amlodipine with hold parameters  dash diet hold oral hypoglycemics  ISS  monitor fingersticks On Metformin and Jardiance at home.  - Hold oral hypoglycemics  - Start ISS and FS checks qAC and qhs  - F/u A1c

## 2021-04-18 NOTE — H&P ADULT - NEGATIVE OPHTHALMOLOGIC SYMPTOMS
no diplopia/no photophobia/no lacrimation L/no lacrimation R no diplopia/no photophobia/no lacrimation L/no lacrimation R/no blurred vision L/no blurred vision R

## 2021-04-18 NOTE — CONSULT NOTE ADULT - ATTENDING COMMENTS
Agree with above   71 year old man with history of CAD DM HTN HLD with admission for COVID and found to have AF  -agree with ac--can transition to NOAC when stable  -continue Metoprolol and uptitrate as tolerates  -FU TTE

## 2021-04-18 NOTE — PROGRESS NOTE ADULT - PROBLEM SELECTOR PLAN 8
BPs relatively soft.   - Hold amlodipine for now - VTE ppx: HSQ  - BPH: c/w Flomax  - Dispo: pending hospital course, anticipate 4-5 days.

## 2021-04-18 NOTE — H&P ADULT - PROBLEM SELECTOR PROBLEM 5
Type 2 diabetes mellitus with hyperglycemia, without long-term current use of insulin Hyperlipidemia

## 2021-04-18 NOTE — PROGRESS NOTE ADULT - PROBLEM SELECTOR PROBLEM 6
Type 2 diabetes mellitus with hyperglycemia, without long-term current use of insulin CAD (coronary artery disease)

## 2021-04-18 NOTE — H&P ADULT - PROBLEM SELECTOR PLAN 7
Given patient with hx of CAD on asa an dplavix. Patient is also being started on AC for afib  Need to confirm with patient's cardiologist whether he should be still taking plavix. He had stents placed in 2011.  Will continue asa, plavix for now Given patient with hx of CAD on asa and plavix. Patient is also being started on AC for afib  Need to confirm with patient's cardiologist whether he should be still taking Plavix. He had stents placed in 2011.  Will continue asa, Plavix for now Given patient with hx of CAD on ASA and Plavix. Patient is also being started on AC for afib.  - Need to confirm with patient's cardiologist whether he should be still taking Plavix. He had stents placed in 2011.  - C/w ASA and Plavix for now

## 2021-04-19 NOTE — PROGRESS NOTE ADULT - PROBLEM SELECTOR PLAN 3
Per patient, no previous hx of afib. Patient was in sinus tachycardia/NSR when he initially came to ED. Patient then converted to afib. HR: 90s-100s.   [id number] score of 4, therefore started on full dose AC.   - Hep gtt started, will transition to NOAC when stable.   - Started low dose Lopressor 12.5mg q12 , with HOLD parameters.   - Monitor on telemetry.   - Cardiology consult obtained, appreciate recommendations.

## 2021-04-19 NOTE — PROGRESS NOTE ADULT - PROBLEM SELECTOR PLAN 6
- Given patient with hx of CAD on ASA and Plavix. Started on AC for afib.  - Need to confirm with patient's Cardiologist whether he should be still taking Plavix. He had stents placed in 2011.  - C/w ASA and Plavix for now. Continue Statin.

## 2021-04-19 NOTE — PROGRESS NOTE ADULT - SUBJECTIVE AND OBJECTIVE BOX
Patient seen and examined at bedside.    Overnight Events: No acute events overnight, remains on HFNC, chest pain free, no events on telemetry.     Current Meds:  acetaminophen   Tablet .. 650 milliGRAM(s) Oral every 4 hours PRN  ALBUTerol    90 MICROgram(s) HFA Inhaler 2 Puff(s) Inhalation every 4 hours PRN  aspirin enteric coated 81 milliGRAM(s) Oral daily  atorvastatin 40 milliGRAM(s) Oral at bedtime  clopidogrel Tablet 75 milliGRAM(s) Oral daily  dexAMETHasone  Injectable 6 milliGRAM(s) IV Push every 24 hours  dextrose 40% Gel 15 Gram(s) Oral once  dextrose 5%. 1000 milliLiter(s) IV Continuous <Continuous>  dextrose 5%. 1000 milliLiter(s) IV Continuous <Continuous>  dextrose 50% Injectable 25 Gram(s) IV Push once  dextrose 50% Injectable 12.5 Gram(s) IV Push once  dextrose 50% Injectable 25 Gram(s) IV Push once  glucagon  Injectable 1 milliGRAM(s) IntraMuscular once  guaiFENesin   Syrup  (Sugar-Free) 100 milliGRAM(s) Oral every 6 hours PRN  heparin   Injectable 5500 Unit(s) IV Push once  heparin   Injectable 5500 Unit(s) IV Push every 6 hours PRN  heparin   Injectable 2500 Unit(s) IV Push every 6 hours PRN  heparin  Infusion.  Unit(s)/Hr IV Continuous <Continuous>  insulin lispro (ADMELOG) corrective regimen sliding scale   SubCutaneous three times a day before meals  insulin lispro (ADMELOG) corrective regimen sliding scale   SubCutaneous at bedtime  metoprolol tartrate 12.5 milliGRAM(s) Oral two times a day  pantoprazole    Tablet 40 milliGRAM(s) Oral before breakfast  remdesivir  IVPB   IV Intermittent   remdesivir  IVPB 100 milliGRAM(s) IV Intermittent every 24 hours  tamsulosin 0.4 milliGRAM(s) Oral at bedtime    Vitals:  T(F): 97.3 (04-19), Max: 98 (04-18)  HR: 94 (04-19) (73 - 96)  BP: 125/72 (04-19) (113/75 - 128/83)  RR: 20 (04-19)  SpO2: 97% (04-19)  I&O's Summary    18 Apr 2021 07:01  -  19 Apr 2021 07:00  --------------------------------------------------------  IN: 0 mL / OUT: 900 mL / NET: -900 mL    19 Apr 2021 07:01  -  19 Apr 2021 15:31  --------------------------------------------------------  IN: 0 mL / OUT: 475 mL / NET: -475 mL      Physical Exam:  Appearance: No acute distress; well appearing  Cardiovascular: RRR, S1, S2, no murmurs, rubs, or gallops; no edema; no JVD  Respiratory: remains on HFNC, b/l rhonchi  Gastrointestinal: soft, non-tender, non-distended with normal bowel sounds  Musculoskeletal: No clubbing; no joint deformity   Neurologic: Non-focal  Psychiatry: AAOx3, mood & affect appropriate  Skin: No rashes, ecchymoses, or cyanosis                          14.3   10.29 )-----------( 179      ( 19 Apr 2021 06:16 )             42.1     04-19    135  |  101  |  37<H>  ----------------------------<  260<H>  4.6   |  20<L>  |  0.68    Ca    8.3<L>      19 Apr 2021 06:16  Phos  4.3     04-18  Mg     2.9     04-18    TPro  6.4  /  Alb  2.9<L>  /  TBili  0.6  /  DBili  <0.2  /  AST  57<H>  /  ALT  44<H>  /  AlkPhos  125<H>  04-19    PT/INR - ( 19 Apr 2021 06:16 )   PT: 12.9 sec;   INR: 1.13 ratio       PTT - ( 19 Apr 2021 06:16 )  PTT:90.6 sec  CARDIAC MARKERS ( 18 Apr 2021 03:17 )  53 ng/L / x     / x     / 130 U/L / x     / 2.8 ng/mL  CARDIAC MARKERS ( 17 Apr 2021 19:15 )  118 ng/L / x     / x     / 88 U/L / x     / 1.5 ng/mL    Serum Pro-Brain Natriuretic Peptide: 1314 pg/mL (04-17 @ 19:15)    72 y/o M with hx of HTN, HLD, CAD s/p stents, DM type 2 presents with severe COVID infection, remains of HFNC, cardiology consulted for atrial fibrillation, rates fairly well controlled.     - agree with metoprolol as written, rates reasonably well controlled   - continue heparin drip, transition to apixaban when stable   - awaiting TTE, unlikely to    - low concern for ischemic disease, troponin likely represent demand in the setting of COVID   - agree with aspirin and statin  - COVID treatment per primary team   - monitor on tele   - cardiology will sign off, please re consult with further concerns

## 2021-04-19 NOTE — PROGRESS NOTE ADULT - ASSESSMENT
A 70 y/o M with hx of HTN, HLD, CAD s/p stents [2011, on ASA and Plavix], DM type 2 presents with SOB and hypoxia. Admitted for acute hypoxic respiratory failure 2/2 PNA likely 2/2 COVID-19.

## 2021-04-19 NOTE — PROGRESS NOTE ADULT - SUBJECTIVE AND OBJECTIVE BOX
Patient is a 71y old  Male who presents with a chief complaint of SOB and hypoxia, COVID (18 Apr 2021 15:16)        SUBJECTIVE / OVERNIGHT EVENTS:  no acute events o/n  pt Armenian speaking, called daughter Kasi for translation   pt denies complaints  feels comfortable    MEDICATIONS  (STANDING):  aspirin enteric coated 81 milliGRAM(s) Oral daily  atorvastatin 40 milliGRAM(s) Oral at bedtime  clopidogrel Tablet 75 milliGRAM(s) Oral daily  dexAMETHasone  Injectable 6 milliGRAM(s) IV Push every 24 hours  dextrose 40% Gel 15 Gram(s) Oral once  dextrose 5%. 1000 milliLiter(s) (50 mL/Hr) IV Continuous <Continuous>  dextrose 5%. 1000 milliLiter(s) (100 mL/Hr) IV Continuous <Continuous>  dextrose 50% Injectable 25 Gram(s) IV Push once  dextrose 50% Injectable 12.5 Gram(s) IV Push once  dextrose 50% Injectable 25 Gram(s) IV Push once  glucagon  Injectable 1 milliGRAM(s) IntraMuscular once  heparin   Injectable 5500 Unit(s) IV Push once  heparin  Infusion.  Unit(s)/Hr (12 mL/Hr) IV Continuous <Continuous>  insulin lispro (ADMELOG) corrective regimen sliding scale   SubCutaneous three times a day before meals  insulin lispro (ADMELOG) corrective regimen sliding scale   SubCutaneous at bedtime  metoprolol tartrate 12.5 milliGRAM(s) Oral two times a day  pantoprazole    Tablet 40 milliGRAM(s) Oral before breakfast  remdesivir  IVPB   IV Intermittent   remdesivir  IVPB 100 milliGRAM(s) IV Intermittent every 24 hours  tamsulosin 0.4 milliGRAM(s) Oral at bedtime    MEDICATIONS  (PRN):  acetaminophen   Tablet .. 650 milliGRAM(s) Oral every 4 hours PRN Temp greater or equal to 38.5C (101.3F)  ALBUTerol    90 MICROgram(s) HFA Inhaler 2 Puff(s) Inhalation every 4 hours PRN Shortness of Breath and/or Wheezing  guaiFENesin   Syrup  (Sugar-Free) 100 milliGRAM(s) Oral every 6 hours PRN Cough  heparin   Injectable 5500 Unit(s) IV Push every 6 hours PRN For aPTT less than 40  heparin   Injectable 2500 Unit(s) IV Push every 6 hours PRN For aPTT between 40 - 57      Vital Signs Last 24 Hrs  T(C): 36.3 (19 Apr 2021 09:45), Max: 36.7 (18 Apr 2021 17:20)  T(F): 97.3 (19 Apr 2021 09:45), Max: 98 (18 Apr 2021 17:20)  HR: 94 (19 Apr 2021 09:45) (73 - 96)  BP: 125/72 (19 Apr 2021 09:45) (113/75 - 128/83)  BP(mean): --  RR: 20 (19 Apr 2021 09:45) (18 - 22)  SpO2: 97% (19 Apr 2021 09:45) (90% - 99%)  CAPILLARY BLOOD GLUCOSE      POCT Blood Glucose.: 245 mg/dL (19 Apr 2021 13:26)  POCT Blood Glucose.: 234 mg/dL (19 Apr 2021 09:14)  POCT Blood Glucose.: 296 mg/dL (18 Apr 2021 21:17)  POCT Blood Glucose.: 288 mg/dL (18 Apr 2021 21:06)  POCT Blood Glucose.: 218 mg/dL (18 Apr 2021 18:16)    I&O's Summary    18 Apr 2021 07:01  -  19 Apr 2021 07:00  --------------------------------------------------------  IN: 0 mL / OUT: 900 mL / NET: -900 mL    19 Apr 2021 07:01  -  19 Apr 2021 14:41  --------------------------------------------------------  IN: 0 mL / OUT: 475 mL / NET: -475 mL          PHYSICAL EXAM  CONSTITUTIONAL: NAD, well-developed, comfortable appearing on HFNC+, not tachypneic.  RESPIRATORY: Normal respiratory effort; lungs are clear to auscultation bilaterally  CARDIOVASCULAR: Regular rate and rhythm, normal S1 and S2  No lower extremity edema; Peripheral pulses are 2+ bilaterally  ABDOMEN: Nontender to palpation, normoactive bowel sounds,   MUSCLOSKELETAL: no clubbing or cyanosis of digits; no joint swelling or tenderness to palpation      LABS:                        14.3   10.29 )-----------( 179      ( 19 Apr 2021 06:16 )             42.1     04-19    135  |  101  |  37<H>  ----------------------------<  260<H>  4.6   |  20<L>  |  0.68    Ca    8.3<L>      19 Apr 2021 06:16  Phos  4.3     04-18  Mg     2.9     04-18    TPro  6.4  /  Alb  2.9<L>  /  TBili  0.6  /  DBili  <0.2  /  AST  57<H>  /  ALT  44<H>  /  AlkPhos  125<H>  04-19    PT/INR - ( 19 Apr 2021 06:16 )   PT: 12.9 sec;   INR: 1.13 ratio         PTT - ( 19 Apr 2021 06:16 )  PTT:90.6 sec  CARDIAC MARKERS ( 18 Apr 2021 03:17 )  x     / x     / 130 U/L / x     / 2.8 ng/mL  CARDIAC MARKERS ( 17 Apr 2021 19:15 )  x     / x     / 88 U/L / x     / 1.5 ng/mL          RADIOLOGY & ADDITIONAL TESTS:    Imaging Personally Reviewed:  Consultant(s) Notes Reviewed:    Care Discussed with Consultants/Other Providers:

## 2021-04-19 NOTE — PROGRESS NOTE ADULT - PROBLEM SELECTOR PLAN 2
- CXR with b/l patchy opacities.  - Continue O2 supplementation via HFNC + NRB. Wean as tolerated.   - Continue Decadron 6 mg daily through 4/26.   - Continue Remdesivir through 4/21, can extend x5 days if not improved.  - Consider candidacy for Toci if not clinically improving.   - Robitussin PRN for cough  - Albuterol PRN for SOB/wheezing   - Airborne and contact precautions  - On full dose AC for Afib, also pending CTA to rule out acute PE.   - Tylenol PRN for fever

## 2021-04-19 NOTE — PROGRESS NOTE ADULT - PROBLEM SELECTOR PLAN 4
- Patient with Trop: 119, CK negative. Not likely ACS per Cardio.  - Patient denies chest pain, but patient with new afib.  - Given COVID status and elevated pro-BNP with tachycardia and tachypnea --> will obtain CTA chest with IV to r/o PE. On full dose AC.   - Cardiology consulted, appreciate recommendations.   - F/u TTE to evaluate for underlying LV thrombus, any wall motion/valvular defects.

## 2021-04-19 NOTE — PROGRESS NOTE ADULT - PROBLEM SELECTOR PLAN 5
- On Metformin and Jardiance at home.  - Hold oral hypoglycemics.  - Start ISS and FS checks qAC and qhs  - F/u A1c - 8.4  - Start Lantus 15 u QHS

## 2021-04-20 NOTE — PROGRESS NOTE ADULT - PROBLEM SELECTOR PLAN 7
- BPs relatively soft.   - Hold Amlodipine for now. - BPs relatively soft.   - Hold Amlodipine for now

## 2021-04-20 NOTE — PROGRESS NOTE ADULT - PROBLEM SELECTOR PLAN 4
- Patient with Trop: 119, CK negative. Not likely ACS per Cardio.  - Patient denies chest pain, but patient with new afib.  - Given COVID status and elevated pro-BNP with tachycardia and tachypnea --> will obtain CTA chest with IV to r/o PE. On full dose AC.   - Cardiology consulted, appreciate recommendations.   - TTE: no acute findings

## 2021-04-20 NOTE — PROGRESS NOTE ADULT - SUBJECTIVE AND OBJECTIVE BOX
Patient is a 71y old  Male who presents with a chief complaint of SOB and hypoxia, COVID (19 Apr 2021 15:31)        SUBJECTIVE / OVERNIGHT EVENTS:  no acute events o/n  pt remains on HFNC  no tachypnea  denies complaints    MEDICATIONS  (STANDING):  aspirin enteric coated 81 milliGRAM(s) Oral daily  atorvastatin 40 milliGRAM(s) Oral at bedtime  clopidogrel Tablet 75 milliGRAM(s) Oral daily  dexAMETHasone  Injectable 6 milliGRAM(s) IV Push every 24 hours  dextrose 40% Gel 15 Gram(s) Oral once  dextrose 5%. 1000 milliLiter(s) (50 mL/Hr) IV Continuous <Continuous>  dextrose 5%. 1000 milliLiter(s) (100 mL/Hr) IV Continuous <Continuous>  dextrose 50% Injectable 25 Gram(s) IV Push once  dextrose 50% Injectable 12.5 Gram(s) IV Push once  dextrose 50% Injectable 25 Gram(s) IV Push once  glucagon  Injectable 1 milliGRAM(s) IntraMuscular once  heparin   Injectable 5500 Unit(s) IV Push once  heparin  Infusion.  Unit(s)/Hr (12 mL/Hr) IV Continuous <Continuous>  insulin lispro (ADMELOG) corrective regimen sliding scale   SubCutaneous three times a day before meals  insulin lispro (ADMELOG) corrective regimen sliding scale   SubCutaneous at bedtime  metoprolol tartrate 12.5 milliGRAM(s) Oral two times a day  pantoprazole    Tablet 40 milliGRAM(s) Oral before breakfast  remdesivir  IVPB   IV Intermittent   remdesivir  IVPB 100 milliGRAM(s) IV Intermittent every 24 hours  tamsulosin 0.4 milliGRAM(s) Oral at bedtime    MEDICATIONS  (PRN):  acetaminophen   Tablet .. 650 milliGRAM(s) Oral every 4 hours PRN Temp greater or equal to 38.5C (101.3F)  ALBUTerol    90 MICROgram(s) HFA Inhaler 2 Puff(s) Inhalation every 4 hours PRN Shortness of Breath and/or Wheezing  guaiFENesin   Syrup  (Sugar-Free) 100 milliGRAM(s) Oral every 6 hours PRN Cough  heparin   Injectable 5500 Unit(s) IV Push every 6 hours PRN For aPTT less than 40  heparin   Injectable 2500 Unit(s) IV Push every 6 hours PRN For aPTT between 40 - 57      Vital Signs Last 24 Hrs  T(C): 37.2 (20 Apr 2021 09:49), Max: 37.2 (20 Apr 2021 09:49)  T(F): 99 (20 Apr 2021 09:49), Max: 99 (20 Apr 2021 09:49)  HR: 100 (20 Apr 2021 14:24) (75 - 100)  BP: 120/78 (20 Apr 2021 09:49) (120/78 - 139/79)  BP(mean): --  RR: 22 (20 Apr 2021 14:24) (19 - 22)  SpO2: 92% (20 Apr 2021 14:24) (92% - 100%)  CAPILLARY BLOOD GLUCOSE      POCT Blood Glucose.: 243 mg/dL (20 Apr 2021 13:35)  POCT Blood Glucose.: 240 mg/dL (20 Apr 2021 09:16)  POCT Blood Glucose.: 363 mg/dL (19 Apr 2021 23:08)  POCT Blood Glucose.: 251 mg/dL (19 Apr 2021 17:46)    I&O's Summary    19 Apr 2021 07:01  -  20 Apr 2021 07:00  --------------------------------------------------------  IN: 0 mL / OUT: 475 mL / NET: -475 mL          PHYSICAL EXAM  CONSTITUTIONAL: NAD, well-developed, comfortable appearing on HFNC+, not tachypneic.  RESPIRATORY: Normal respiratory effort; lungs are clear to auscultation bilaterally  CARDIOVASCULAR: Regular rate and rhythm, normal S1 and S2  No lower extremity edema; Peripheral pulses are 2+ bilaterally  ABDOMEN: Nontender to palpation, normoactive bowel sounds,   MUSCLOSKELETAL: no clubbing or cyanosis of digits; no joint swelling or tenderness to palpation      LABS:                        14.3   10.32 )-----------( 187      ( 20 Apr 2021 07:04 )             42.4     04-20    137  |  104  |  35<H>  ----------------------------<  272<H>  4.2   |  23  |  0.64    Ca    8.1<L>      20 Apr 2021 07:04  Phos  3.1     04-20  Mg     2.5     04-20    TPro  6.2  /  Alb  2.8<L>  /  TBili  0.7  /  DBili  x   /  AST  31  /  ALT  36  /  AlkPhos  144<H>  04-20    PT/INR - ( 19 Apr 2021 06:16 )   PT: 12.9 sec;   INR: 1.13 ratio         PTT - ( 20 Apr 2021 07:04 )  PTT:100.5 sec          RADIOLOGY & ADDITIONAL TESTS:    Imaging Personally Reviewed:  Consultant(s) Notes Reviewed:    Care Discussed with Consultants/Other Providers:

## 2021-04-20 NOTE — PROGRESS NOTE ADULT - PROBLEM SELECTOR PLAN 8
- VTE ppx: oh heparin gtt  - BPH: c/w Flomax    Daughter updated of plan on 4/19 - VTE ppx: oh heparin gtt  - BPH: c/w Flomax    Granddaughter updated of plan on 4/20

## 2021-04-20 NOTE — PROGRESS NOTE ADULT - PROBLEM SELECTOR PLAN 5
- On Metformin and Jardiance at home.  - Hold oral hypoglycemics.  - Start ISS and FS checks qAC and qhs  - F/u A1c - 8.4  -Uptitrate insulin regimen as FS elevated

## 2021-04-20 NOTE — PROGRESS NOTE ADULT - PROBLEM SELECTOR PLAN 2
- CXR with b/l patchy opacities.  - Continue O2 supplementation via HFNC + NRB. Wean as tolerated.   - Continue Decadron 6 mg daily through 4/26.   - Continue Remdesivir through 4/21, can extend x5 days if not improved.  - D/w ID, pt not candidate for toci at this time   - Robitussin PRN for cough  - Albuterol PRN for SOB/wheezing   - Airborne and contact precautions  - On full dose AC for Afib, also pending CTA to rule out acute PE.   - Tylenol PRN for fever

## 2021-04-20 NOTE — PROGRESS NOTE ADULT - PROBLEM SELECTOR PLAN 3
Per patient, no previous hx of afib. Patient was in sinus tachycardia/NSR when he initially came to ED. Patient then converted to afib. HR: 90s-100s.   [id number] score of 4, therefore started on full dose AC.   - Hep gtt started, will transition to NOAC when stable.   - Started low dose Lopressor 12.5mg q12 , with HOLD parameters.   - Monitor on telemetry.   - Cardiology consult obtained, appreciate recommendations.

## 2021-04-21 NOTE — PROGRESS NOTE ADULT - PROBLEM SELECTOR PLAN 3
Per patient, no previous hx of afib. Patient was in sinus tachycardia/NSR when he initially came to ED. Patient then converted to afib. HR: 90s-100s.   [id number] score of 4, therefore started on full dose AC.   - Hep gtt started, will transition to NOAC when stable.   - Started low dose Lopressor 12.5mg q12 , with HOLD parameters.   - Monitor on telemetry.   - Cardiology consult obtained, appreciate recommendations.

## 2021-04-21 NOTE — PROGRESS NOTE ADULT - SUBJECTIVE AND OBJECTIVE BOX
Patient is a 71y old  Male who presents with a chief complaint of SOB and hypoxia, COVID (20 Apr 2021 15:02)        SUBJECTIVE / OVERNIGHT EVENTS:  no acute events o/n  pt w/o complaints  comfortable      MEDICATIONS  (STANDING):  aspirin enteric coated 81 milliGRAM(s) Oral daily  atorvastatin 40 milliGRAM(s) Oral at bedtime  clopidogrel Tablet 75 milliGRAM(s) Oral daily  dexAMETHasone  Injectable 6 milliGRAM(s) IV Push every 24 hours  dextrose 40% Gel 15 Gram(s) Oral once  dextrose 5%. 1000 milliLiter(s) (50 mL/Hr) IV Continuous <Continuous>  dextrose 5%. 1000 milliLiter(s) (100 mL/Hr) IV Continuous <Continuous>  dextrose 50% Injectable 25 Gram(s) IV Push once  dextrose 50% Injectable 12.5 Gram(s) IV Push once  dextrose 50% Injectable 25 Gram(s) IV Push once  glucagon  Injectable 1 milliGRAM(s) IntraMuscular once  heparin   Injectable 5500 Unit(s) IV Push once  heparin  Infusion.  Unit(s)/Hr (12 mL/Hr) IV Continuous <Continuous>  insulin glargine Injectable (LANTUS) 15 Unit(s) SubCutaneous at bedtime  insulin lispro (ADMELOG) corrective regimen sliding scale   SubCutaneous three times a day before meals  insulin lispro (ADMELOG) corrective regimen sliding scale   SubCutaneous at bedtime  metoprolol tartrate 12.5 milliGRAM(s) Oral two times a day  pantoprazole    Tablet 40 milliGRAM(s) Oral before breakfast  remdesivir  IVPB   IV Intermittent   remdesivir  IVPB 100 milliGRAM(s) IV Intermittent every 24 hours  tamsulosin 0.4 milliGRAM(s) Oral at bedtime    MEDICATIONS  (PRN):  acetaminophen   Tablet .. 650 milliGRAM(s) Oral every 4 hours PRN Temp greater or equal to 38.5C (101.3F)  ALBUTerol    90 MICROgram(s) HFA Inhaler 2 Puff(s) Inhalation every 4 hours PRN Shortness of Breath and/or Wheezing  guaiFENesin   Syrup  (Sugar-Free) 100 milliGRAM(s) Oral every 6 hours PRN Cough  heparin   Injectable 5500 Unit(s) IV Push every 6 hours PRN For aPTT less than 40  heparin   Injectable 2500 Unit(s) IV Push every 6 hours PRN For aPTT between 40 - 57      Vital Signs Last 24 Hrs  T(C): 36.4 (21 Apr 2021 12:45), Max: 36.4 (20 Apr 2021 22:02)  T(F): 97.6 (21 Apr 2021 12:45), Max: 97.6 (21 Apr 2021 12:45)  HR: 86 (21 Apr 2021 12:45) (82 - 98)  BP: 139/98 (21 Apr 2021 12:45) (116/95 - 149/88)  BP(mean): --  RR: 24 (21 Apr 2021 12:45) (18 - 24)  SpO2: 95% (21 Apr 2021 12:45) (92% - 96%)  CAPILLARY BLOOD GLUCOSE      POCT Blood Glucose.: 206 mg/dL (21 Apr 2021 13:21)  POCT Blood Glucose.: 266 mg/dL (21 Apr 2021 09:19)  POCT Blood Glucose.: 278 mg/dL (20 Apr 2021 21:12)  POCT Blood Glucose.: 247 mg/dL (20 Apr 2021 18:52)  POCT Blood Glucose.: 260 mg/dL (20 Apr 2021 17:48)    I&O's Summary    20 Apr 2021 07:01  -  21 Apr 2021 07:00  --------------------------------------------------------  IN: 0 mL / OUT: 600 mL / NET: -600 mL          PHYSICAL EXAM  CONSTITUTIONAL: NAD, well-developed, comfortable appearing on HFNC+, not tachypneic.  RESPIRATORY: Normal respiratory effort; lungs are clear to auscultation bilaterally  CARDIOVASCULAR: Regular rate and rhythm, normal S1 and S2  No lower extremity edema; Peripheral pulses are 2+ bilaterally  ABDOMEN: Nontender to palpation, normoactive bowel sounds,   MUSCLOSKELETAL: no clubbing or cyanosis of digits; no joint swelling or tenderness to palpation      LABS:                        14.5   10.82 )-----------( 196      ( 21 Apr 2021 06:38 )             41.9     04-21    132<L>  |  101  |  30<H>  ----------------------------<  255<H>  4.5   |  21<L>  |  0.60    Ca    7.8<L>      21 Apr 2021 06:38  Phos  3.4     04-21  Mg     2.3     04-21    TPro  6.1  /  Alb  2.7<L>  /  TBili  0.9  /  DBili  0.2  /  AST  46<H>  /  ALT  47<H>  /  AlkPhos  148<H>  04-21    PT/INR - ( 21 Apr 2021 06:38 )   PT: 13.9 sec;   INR: 1.22 ratio         PTT - ( 21 Apr 2021 06:38 )  PTT:72.5 sec          RADIOLOGY & ADDITIONAL TESTS:    Imaging Personally Reviewed:  Consultant(s) Notes Reviewed:    Care Discussed with Consultants/Other Providers:

## 2021-04-21 NOTE — DISCHARGE NOTE PROVIDER - NSDCMRMEDTOKEN_GEN_ALL_CORE_FT
amLODIPine 5 mg oral tablet: 1 tab(s) orally once a day  Aspirin Enteric Coated 81 mg oral delayed release tablet: 1 tab(s) orally once a day  Eliquis Starter Pack for Treatment of DVT and PE 5 mg oral tablet: Take as directed.  Flomax 0.4 mg oral capsule: 1 cap(s) orally once a day  Jardiance 10 mg oral tablet: 1 tab(s) orally once a day (in the morning)  Lipitor 40 mg oral tablet: 1 tab(s) orally once a day  metFORMIN 850 mg oral tablet: 1 tab(s) orally once a day (in the morning)  omeprazole 40 mg oral delayed release capsule: 1 cap(s) orally once a day  Plavix 75 mg oral tablet: 1 tab(s) orally once a day  Vascepa 1 g oral capsule: 1 cap(s) orally 2 times a day

## 2021-04-21 NOTE — PROGRESS NOTE ADULT - PROBLEM SELECTOR PLAN 8
- VTE ppx: oh heparin gtt  - BPH: c/w Flomax    Granddaughter updated of plan on 4/20, tried calling on 4/21, no answer

## 2021-04-22 NOTE — PHYSICAL THERAPY INITIAL EVALUATION ADULT - LEVEL OF INDEPENDENCE: SIT/STAND, REHAB EVAL
pt. reporting fatigue seated at edge of bed (see above) further functional mobility to be assessed as feasible/unable to perform

## 2021-04-22 NOTE — PHYSICAL THERAPY INITIAL EVALUATION ADULT - PATIENT/FAMILY/SIGNIFICANT OTHER GOALS STATEMENT, PT EVAL
pt. presenting with some confusion throughout duration of therapy session, no goals provided at this time

## 2021-04-22 NOTE — PHYSICAL THERAPY INITIAL EVALUATION ADULT - ORIENTATION, REHAB EVAL
secondary to confusion to questions, pt however alert and able/willing to participate in PT session./unable to assess

## 2021-04-22 NOTE — PHYSICAL THERAPY INITIAL EVALUATION ADULT - LEVEL OF CONSCIOUSNESS, REHAB EVAL
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    -- no need for emergent surgical intervention   -- recommend heme/onc consult   -- recommend CT chest/abd/plevis with po and IV contrast     Plan:  -- f/u neurosurgery recs  -- keep npo   alert/confused

## 2021-04-22 NOTE — PROGRESS NOTE ADULT - PROBLEM SELECTOR PLAN 8
- VTE ppx: oh heparin gtt  - BPH: c/w Flomax    Spoke w/ granddaughter Ashely on 4/22 and updated plan of care  Reviewed GOC, pt is full code  Pt is high risk for intubation at this time, prognosis guarded

## 2021-04-22 NOTE — PHYSICAL THERAPY INITIAL EVALUATION ADULT - PERTINENT HX OF CURRENT PROBLEM, REHAB EVAL
Pt. is a 71 year old male admitted to [facility] with hypoxemia, (+) COVID-19. PMH: hyperlipidemia, CAD, HTN.

## 2021-04-22 NOTE — PROGRESS NOTE ADULT - PROBLEM SELECTOR PLAN 3
Per patient, no previous hx of afib. Patient was in sinus tachycardia/NSR when he initially came to ED. Patient then converted to afib. HR: 90s-100s.   [id number] score of 4, therefore started on full dose AC.   - Hep gtt started, will transition to NOAC when stable.   - Started low dose Lopressor 12.5mg q12 , with HOLD parameters.   - Monitor on telemetry.   - Cardiology consult obtained, appreciate recommendations.

## 2021-04-22 NOTE — PHYSICAL THERAPY INITIAL EVALUATION ADULT - ADDITIONAL COMMENTS
upon entering room, spo2 assessed to be 92% on high flow oxygen. upon sitting at edge of bed Spo2 decreased to 88%; pt. reporting fatigue and wishing to return to bed. Pt. instructed on deep breathing exercises in which Spo2 returned to above 90%. Pt. left comfortable in bed with all tubes/lines intact, call bell in reach and in NAD.

## 2021-04-22 NOTE — PROGRESS NOTE ADULT - SUBJECTIVE AND OBJECTIVE BOX
Patient is a 71y old  Male who presents with a chief complaint of SOB and hypoxia, COVID (21 Apr 2021 15:11)        SUBJECTIVE / OVERNIGHT EVENTS:    no acute events overnight  pt remains comfortable on 60/100 HFNC + NRB  trialed off NRB, pt desats to high 80s  no tachypnea, comfortable appearing, denies complaints        MEDICATIONS  (STANDING):  aspirin enteric coated 81 milliGRAM(s) Oral daily  atorvastatin 40 milliGRAM(s) Oral at bedtime  clopidogrel Tablet 75 milliGRAM(s) Oral daily  dexAMETHasone  Injectable 6 milliGRAM(s) IV Push every 24 hours  dextrose 40% Gel 15 Gram(s) Oral once  dextrose 5%. 1000 milliLiter(s) (50 mL/Hr) IV Continuous <Continuous>  dextrose 5%. 1000 milliLiter(s) (100 mL/Hr) IV Continuous <Continuous>  dextrose 50% Injectable 25 Gram(s) IV Push once  dextrose 50% Injectable 12.5 Gram(s) IV Push once  dextrose 50% Injectable 25 Gram(s) IV Push once  glucagon  Injectable 1 milliGRAM(s) IntraMuscular once  heparin   Injectable 5500 Unit(s) IV Push once  heparin  Infusion.  Unit(s)/Hr (12 mL/Hr) IV Continuous <Continuous>  insulin glargine Injectable (LANTUS) 18 Unit(s) SubCutaneous at bedtime  insulin lispro (ADMELOG) corrective regimen sliding scale   SubCutaneous three times a day before meals  insulin lispro (ADMELOG) corrective regimen sliding scale   SubCutaneous at bedtime  metoprolol tartrate 12.5 milliGRAM(s) Oral two times a day  pantoprazole    Tablet 40 milliGRAM(s) Oral before breakfast  tamsulosin 0.4 milliGRAM(s) Oral at bedtime    MEDICATIONS  (PRN):  acetaminophen   Tablet .. 650 milliGRAM(s) Oral every 4 hours PRN Temp greater or equal to 38.5C (101.3F)  ALBUTerol    90 MICROgram(s) HFA Inhaler 2 Puff(s) Inhalation every 4 hours PRN Shortness of Breath and/or Wheezing  guaiFENesin   Syrup  (Sugar-Free) 100 milliGRAM(s) Oral every 6 hours PRN Cough  heparin   Injectable 5500 Unit(s) IV Push every 6 hours PRN For aPTT less than 40  heparin   Injectable 2500 Unit(s) IV Push every 6 hours PRN For aPTT between 40 - 57      Vital Signs Last 24 Hrs  T(C): 36.5 (22 Apr 2021 06:09), Max: 36.8 (21 Apr 2021 17:58)  T(F): 97.7 (22 Apr 2021 06:09), Max: 98.2 (21 Apr 2021 17:58)  HR: 93 (22 Apr 2021 11:52) (72 - 93)  BP: 132/93 (22 Apr 2021 06:09) (131/92 - 138/96)  BP(mean): --  RR: 20 (22 Apr 2021 11:52) (18 - 22)  SpO2: 90% (22 Apr 2021 11:52) (90% - 98%)  CAPILLARY BLOOD GLUCOSE      POCT Blood Glucose.: 216 mg/dL (22 Apr 2021 10:01)  POCT Blood Glucose.: 257 mg/dL (22 Apr 2021 09:44)  POCT Blood Glucose.: 210 mg/dL (21 Apr 2021 22:41)  POCT Blood Glucose.: 223 mg/dL (21 Apr 2021 21:23)  POCT Blood Glucose.: 195 mg/dL (21 Apr 2021 18:08)    I&O's Summary        PHYSICAL EXAM  CONSTITUTIONAL: NAD, well-developed, comfortable appearing on HFNC+, not tachypneic  RESPIRATORY: Normal respiratory effort; lungs are clear to auscultation bilaterally  CARDIOVASCULAR: Regular rate and rhythm, normal S1 and S2  No lower extremity edema; Peripheral pulses are 2+ bilaterally  ABDOMEN: Nontender to palpation, normoactive bowel sounds,   MUSCLOSKELETAL: no clubbing or cyanosis of digits; no joint swelling or tenderness to palpation    LABS:                        16.3   11.55 )-----------( 255      ( 22 Apr 2021 07:18 )             46.6     04-22    133<L>  |  97<L>  |  25<H>  ----------------------------<  242<H>  4.7   |  22  |  0.69    Ca    8.5      22 Apr 2021 07:18  Phos  3.4     04-21  Mg     2.3     04-21    TPro  7.0  /  Alb  3.4  /  TBili  1.3<H>  /  DBili  0.3<H>  /  AST  42<H>  /  ALT  55<H>  /  AlkPhos  179<H>  04-22    PT/INR - ( 22 Apr 2021 07:18 )   PT: 13.5 sec;   INR: 1.19 ratio         PTT - ( 22 Apr 2021 07:18 )  PTT:78.5 sec          RADIOLOGY & ADDITIONAL TESTS:    Imaging Personally Reviewed:  Consultant(s) Notes Reviewed:    Care Discussed with Consultants/Other Providers:

## 2021-04-22 NOTE — PROGRESS NOTE ADULT - PROBLEM SELECTOR PLAN 2
- CXR with b/l patchy opacities.  - Continue O2 supplementation via HFNC + NRB. Wean as tolerated.   - Continue Decadron 6 mg daily through 4/26.   - Completed remdesivir on 4/21, will extend today for another 5 day course.   - D/w ID, pt not candidate for toci at this time   - Robitussin PRN for cough  - Albuterol PRN for SOB/wheezing   - Airborne and contact precautions  - On full dose AC for Afib, also pending CTA to rule out acute PE.   - Tylenol PRN for fever

## 2021-04-23 NOTE — CHART NOTE - NSCHARTNOTEFT_GEN_A_CORE
Alerted by RN that patient is lethargic, RN did not use . Patient seen and examined at bedside. Patient found to be sleeping comfortably in his bed in no acute distress. English  used #[id number]. Patient found to be A&Ox3, patient denying any pain or complaints at this time. Denies SOB, fatigue, chest pain, weakness, LOC, blurred vision, HA, abdominal pain. Will continue to monitor patient closely overnight. Patient found to be at his baseline using the  phone,  will be encouraged to be used more frequently. Miscommunication likely 2/2 to language barrier.

## 2021-04-23 NOTE — PROGRESS NOTE ADULT - PROBLEM SELECTOR PLAN 3
Per patient, no previous hx of afib. Patient was in sinus tachycardia/NSR when he initially came to ED. Patient then converted to afib. HR: 90s-100s.   [id number] score of 4, therefore started on full dose AC.   - Hep gtt started, will transition to NOAC when stable.   - Started low dose Lopressor 12.5mg q12 , with HOLD parameters.   - Monitor on telemetry.   - Cardiology consult obtained, appreciate recommendations.

## 2021-04-23 NOTE — PROGRESS NOTE ADULT - PROBLEM SELECTOR PLAN 2
- CXR with b/l patchy opacities.  - Continue O2 supplementation via HFNC + NRB. Wean as tolerated.   - Continue Decadron 6 mg daily through 4/26.   - Completed remdesivir on 4/21, will extend today for another 5 day course.   - D/w ID, pt not candidate for toci at this time   - Robitussin PRN for cough  - Albuterol PRN for SOB/wheezing   - Airborne and contact precautions  - On full dose AC for Afib  - Tylenol PRN for fever

## 2021-04-23 NOTE — PROGRESS NOTE ADULT - PROBLEM SELECTOR PLAN 8
- VTE ppx: on heparin gtt  - BPH: c/w Flomax    Spoke w/ granddaughter Ashely on 4/22 and updated plan of care  Reviewed GOC, pt is full code  Pt is high risk for intubation at this time, prognosis guarded

## 2021-04-23 NOTE — PROGRESS NOTE ADULT - SUBJECTIVE AND OBJECTIVE BOX
Medicine Progress Note    Patient is a 71y old  Male who presents with a chief complaint of SOB and hypoxia, COVID (22 Apr 2021 13:24)      SUBJECTIVE / OVERNIGHT EVENTS: pt reports breathing is unchanged from yesterday    ADDITIONAL REVIEW OF SYSTEMS:    MEDICATIONS  (STANDING):  aspirin enteric coated 81 milliGRAM(s) Oral daily  atorvastatin 40 milliGRAM(s) Oral at bedtime  clopidogrel Tablet 75 milliGRAM(s) Oral daily  dexAMETHasone  Injectable 6 milliGRAM(s) IV Push every 24 hours  dextrose 40% Gel 15 Gram(s) Oral once  dextrose 5%. 1000 milliLiter(s) (50 mL/Hr) IV Continuous <Continuous>  dextrose 5%. 1000 milliLiter(s) (100 mL/Hr) IV Continuous <Continuous>  dextrose 50% Injectable 25 Gram(s) IV Push once  dextrose 50% Injectable 12.5 Gram(s) IV Push once  dextrose 50% Injectable 25 Gram(s) IV Push once  glucagon  Injectable 1 milliGRAM(s) IntraMuscular once  heparin   Injectable 5500 Unit(s) IV Push once  heparin  Infusion.  Unit(s)/Hr (12 mL/Hr) IV Continuous <Continuous>  insulin glargine Injectable (LANTUS) 18 Unit(s) SubCutaneous at bedtime  insulin lispro (ADMELOG) corrective regimen sliding scale   SubCutaneous three times a day before meals  insulin lispro (ADMELOG) corrective regimen sliding scale   SubCutaneous at bedtime  metoprolol tartrate 12.5 milliGRAM(s) Oral two times a day  pantoprazole    Tablet 40 milliGRAM(s) Oral before breakfast  remdesivir  IVPB 100 milliGRAM(s) IV Intermittent every 24 hours  tamsulosin 0.4 milliGRAM(s) Oral at bedtime    MEDICATIONS  (PRN):  acetaminophen   Tablet .. 650 milliGRAM(s) Oral every 4 hours PRN Temp greater or equal to 38.5C (101.3F)  ALBUTerol    90 MICROgram(s) HFA Inhaler 2 Puff(s) Inhalation every 4 hours PRN Shortness of Breath and/or Wheezing  guaiFENesin   Syrup  (Sugar-Free) 100 milliGRAM(s) Oral every 6 hours PRN Cough  heparin   Injectable 5500 Unit(s) IV Push every 6 hours PRN For aPTT less than 40  heparin   Injectable 2500 Unit(s) IV Push every 6 hours PRN For aPTT between 40 - 57    CAPILLARY BLOOD GLUCOSE      POCT Blood Glucose.: 246 mg/dL (23 Apr 2021 17:39)  POCT Blood Glucose.: 227 mg/dL (23 Apr 2021 13:14)  POCT Blood Glucose.: 202 mg/dL (23 Apr 2021 09:22)  POCT Blood Glucose.: 192 mg/dL (23 Apr 2021 00:40)  POCT Blood Glucose.: 150 mg/dL (22 Apr 2021 21:18)    I&O's Summary    22 Apr 2021 07:01  -  23 Apr 2021 07:00  --------------------------------------------------------  IN: 358 mL / OUT: 0 mL / NET: 358 mL    23 Apr 2021 07:01  -  23 Apr 2021 19:55  --------------------------------------------------------  IN: 800 mL / OUT: 450 mL / NET: 350 mL        PHYSICAL EXAM:  Vital Signs Last 24 Hrs  T(C): 36.5 (23 Apr 2021 08:52), Max: 36.7 (23 Apr 2021 05:14)  T(F): 97.7 (23 Apr 2021 08:52), Max: 98.1 (23 Apr 2021 05:14)  HR: 80 (23 Apr 2021 18:24) (68 - 90)  BP: 141/71 (23 Apr 2021 18:24) (104/79 - 141/71)  BP(mean): --  RR: 22 (23 Apr 2021 18:24) (18 - 22)  SpO2: 91% (23 Apr 2021 18:24) (90% - 98%)  CONSTITUTIONAL: NAD, well-developed, well-groomed  ENMT: Moist oral mucosa, no pharyngeal injection or exudates; normal dentition  RESPIRATORY: Normal respiratory effort; no accessory muscle use, decreased bs  CARDIOVASCULAR: Regular rate and rhythm, normal S1 and S2, no murmur/rub/gallop; No lower extremity edema; Peripheral pulses are 2+ bilaterally  ABDOMEN: Nontender to palpation, normoactive bowel sounds, no rebound/guarding; No hepatosplenomegaly  PSYCH: A+O to person, place, and time; affect appropriate  NEUROLOGY: CN 2-12 are intact and symmetric; no gross sensory deficits   SKIN: No rashes; no palpable lesions    LABS:                        14.0   8.16  )-----------( 185      ( 23 Apr 2021 07:03 )             40.6     04-23    132<L>  |  101  |  31<H>  ----------------------------<  241<H>  4.6   |  21<L>  |  0.59    Ca    8.0<L>      23 Apr 2021 07:03y  Phos  3.6     04-23  Mg     2.3     04-23    TPro  5.7<L>  /  Alb  2.6<L>  /  TBili  1.1  /  DBili  0.4<H>  /  AST  28  /  ALT  33  /  AlkPhos  133<H>  04-23    PT/INR - ( 23 Apr 2021 07:03 )   PT: 15.3 sec;   INR: 1.35 ratio         PTT - ( 23 Apr 2021 07:03 )  PTT:85.8 sec          COVID-19 PCR: Detected (17 Apr 2021 19:13)      RADIOLOGY & ADDITIONAL TESTS:  Imaging from Last 24 Hours:    Electrocardiogram/QTc Interval:    COORDINATION OF CARE:  Care Discussed with Consultants/Other Providers:

## 2021-04-23 NOTE — PROGRESS NOTE ADULT - PROBLEM SELECTOR PLAN 4
- Patient with Trop: 119, CK negative. Not likely ACS per Cardio.  - Patient denies chest pain, but patient with new afib.  - Given COVID status and elevated pro-BNP with tachycardia and tachypnea --> will obtain CTA chest with IV to r/o PE. On full dose AC. CT with positive pe;  -if remains stable can transition to DOAC  - Cardiology consulted, appreciate recommendations.   - TTE: no acute findings - Patient with Trop: 119, CK negative. Not likely ACS per Cardio.  - Patient denies chest pain, but patient with new afib.  - Given COVID status and elevated pro-BNP with tachycardia and tachypnea --> will obtain CTA chest with IV to r/o PE. On full dose AC.  -if remains stable can transition to DOAC  - Cardiology consulted, appreciate recommendations.   - TTE: no acute findings

## 2021-04-24 NOTE — CHART NOTE - NSCHARTNOTEFT_GEN_A_CORE
70 y/o M with hx of HTN, HLD, CAD s/p stents [2011, on ASA and Plavix], DM type 2 presents with SOB and hypoxia. Admitted for acute hypoxic respiratory failure 2/2 PNA likely 2/2 COVID-19. Completed a course of redemsivir 4/21. On decadron for COVID. Not a candidate for toci now. Patient has been on high flow with 70 y/o M with hx of HTN, HLD, CAD s/p stents [2011, on ASA and Plavix], DM type 2 presents with SOB and hypoxia. Admitted for acute hypoxic respiratory failure 2/2 PNA likely 2/2 COVID-19. Completed a course of redemsivir 4/21. On decadron for COVID. Not a candidate for toci now. Patient has been on high flow with NRB. MICU was call to evaluate for intubation. Patient was seen and examined with MICU fellow Dr. Cuevas. A full physical exam was not performed. At this time, patient is breathing okay on high flow and NRB. Denies worsening sob, chest pain, fever, chills. Mentating well.    Recommendation:  -Patient doesn't require intubation at this time  -Would NOT recommend bipap for the patient. If respiratory status worsened, would recommend intubation. Patient is amenable to intubation.  -f/u ABG  -f/u CXR  -Please consult if patient's clinical status worsened

## 2021-04-24 NOTE — CHART NOTE - NSCHARTNOTEFT_GEN_A_CORE
Contacted patient's family (granddaughter Ashely) as per request. Discussed patient condition and plan of care. All questions answered and concerns addressed. Specifically, we discussed patient's continued high oxygen requirements, and the potential for intubation. The patient's family verbalized understanding, and echoed the patient's wishes for him to be intubated if needed. MICU was consulted and assessed the patient at the bedside this afternoon. GOC were addressed with the patient, with the use of  services,  by the MICU resident (MD Gutierrez). The patient agrees to intubation if needed.   Plan to continue to monitor patient condition, ABG in am or sooner if patient condition warrants. Will contact MICU for intubation if patient's respiratory status declines, continue medications (including heparin gtt), follow blood culture results. Discussed with Dr. Mohan, who agrees with plan of care.

## 2021-04-24 NOTE — PROGRESS NOTE ADULT - PROBLEM SELECTOR PLAN 3
Per patient, no previous hx of afib. Patient was in sinus tachycardia/NSR when he initially came to ED. Patient then converted to afib. HR: 90s-100s.   [id number] score of 4, therefore started on full dose AC.   - Hep gtt started, will transition to NOAC when stable.   - Started low dose Lopressor 12.5mg q12 , with HOLD parameters.   - Monitor on telemetry.   - Cardiology consult obtained, appreciate recommendations.

## 2021-04-24 NOTE — PROGRESS NOTE ADULT - ASSESSMENT
A 70 y/o M with hx of HTN, HLD, CAD s/p stents [2011, on ASA and Plavix], DM type 2 presents with SOB and hypoxia. Admitted for acute hypoxic respiratory failure 2/2 PNA likely 2/2 COVID-19.  no history of blood product transfusion

## 2021-04-24 NOTE — PROGRESS NOTE ADULT - PROBLEM SELECTOR PLAN 4
- Patient with Trop: 119, CK negative. Not likely ACS per Cardio.  - Patient denies chest pain, but patient with new afib.  - Given COVID status and elevated pro-BNP with tachycardia and tachypnea --> will obtain CTA chest with IV to r/o PE. On full dose AC.  -if remains stable can transition to DOAC  - Cardiology consulted, appreciate recommendations.   - TTE: no acute findings

## 2021-04-24 NOTE — PROGRESS NOTE ADULT - SUBJECTIVE AND OBJECTIVE BOX
Medicine Progress Note    Patient is a 71y old  Male who presents with a chief complaint of SOB and hypoxia, COVID (23 Apr 2021 19:54)      SUBJECTIVE / OVERNIGHT EVENTS: pt appears more short of breath today    ADDITIONAL REVIEW OF SYSTEMS:    MEDICATIONS  (STANDING):  aspirin enteric coated 81 milliGRAM(s) Oral daily  atorvastatin 40 milliGRAM(s) Oral at bedtime  clopidogrel Tablet 75 milliGRAM(s) Oral daily  dexAMETHasone  Injectable 6 milliGRAM(s) IV Push every 24 hours  dextrose 40% Gel 15 Gram(s) Oral once  dextrose 5%. 1000 milliLiter(s) (50 mL/Hr) IV Continuous <Continuous>  dextrose 5%. 1000 milliLiter(s) (100 mL/Hr) IV Continuous <Continuous>  dextrose 50% Injectable 25 Gram(s) IV Push once  dextrose 50% Injectable 12.5 Gram(s) IV Push once  dextrose 50% Injectable 25 Gram(s) IV Push once  glucagon  Injectable 1 milliGRAM(s) IntraMuscular once  heparin   Injectable 5500 Unit(s) IV Push once  heparin  Infusion.  Unit(s)/Hr (12 mL/Hr) IV Continuous <Continuous>  insulin glargine Injectable (LANTUS) 18 Unit(s) SubCutaneous at bedtime  insulin lispro (ADMELOG) corrective regimen sliding scale   SubCutaneous three times a day before meals  insulin lispro (ADMELOG) corrective regimen sliding scale   SubCutaneous at bedtime  metoprolol tartrate 12.5 milliGRAM(s) Oral two times a day  pantoprazole    Tablet 40 milliGRAM(s) Oral before breakfast  remdesivir  IVPB 100 milliGRAM(s) IV Intermittent every 24 hours  tamsulosin 0.4 milliGRAM(s) Oral at bedtime    MEDICATIONS  (PRN):  acetaminophen   Tablet .. 650 milliGRAM(s) Oral every 4 hours PRN Temp greater or equal to 38.5C (101.3F)  ALBUTerol    90 MICROgram(s) HFA Inhaler 2 Puff(s) Inhalation every 4 hours PRN Shortness of Breath and/or Wheezing  guaiFENesin   Syrup  (Sugar-Free) 100 milliGRAM(s) Oral every 6 hours PRN Cough  heparin   Injectable 5500 Unit(s) IV Push every 6 hours PRN For aPTT less than 40  heparin   Injectable 2500 Unit(s) IV Push every 6 hours PRN For aPTT between 40 - 57    CAPILLARY BLOOD GLUCOSE      POCT Blood Glucose.: 170 mg/dL (24 Apr 2021 13:16)  POCT Blood Glucose.: 175 mg/dL (24 Apr 2021 09:28)  POCT Blood Glucose.: 256 mg/dL (23 Apr 2021 22:18)  POCT Blood Glucose.: 246 mg/dL (23 Apr 2021 17:39)    I&O's Summary    23 Apr 2021 07:01  -  24 Apr 2021 07:00  --------------------------------------------------------  IN: 1135 mL / OUT: 885 mL / NET: 250 mL        PHYSICAL EXAM:  Vital Signs Last 24 Hrs  T(C): 36.4 (24 Apr 2021 12:20), Max: 36.6 (24 Apr 2021 05:16)  T(F): 97.6 (24 Apr 2021 12:20), Max: 97.8 (24 Apr 2021 05:16)  HR: 79 (24 Apr 2021 12:20) (70 - 86)  BP: 133/82 (24 Apr 2021 12:20) (133/82 - 141/71)  BP(mean): --  RR: 20 (24 Apr 2021 12:20) (20 - 22)  SpO2: 95% (24 Apr 2021 12:20) (91% - 98%)  CONSTITUTIONAL: NAD, well-developed, well-groomed  ENMT: Moist oral mucosa, no pharyngeal injection or exudates; normal dentition  RESPIRATORY: Normal respiratory effort; increased accessory muscle use, bibasilar crackles  CARDIOVASCULAR: Regular rate and rhythm, normal S1 and S2, no murmur/rub/gallop; No lower extremity edema; Peripheral pulses are 2+ bilaterally  ABDOMEN: Nontender to palpation, normoactive bowel sounds, no rebound/guarding; No hepatosplenomegaly  PSYCH: A+O to person, place, and time; affect appropriate  NEUROLOGY: CN 2-12 are intact and symmetric; no gross sensory deficits   SKIN: No rashes; no palpable lesions    LABS:                        14.3   11.15 )-----------( 175      ( 24 Apr 2021 06:33 )             42.3     04-24    x   |  x   |  x   ----------------------------<  x   x    |  x   |  0.76    Ca    8.0<L>      23 Apr 2021 07:03  Phos  3.6     04-23  Mg     2.3     04-23    TPro  6.0  /  Alb  2.7<L>  /  TBili  1.2  /  DBili  0.4<H>  /  AST  21  /  ALT  26  /  AlkPhos  158<H>  04-24    PT/INR - ( 24 Apr 2021 06:33 )   PT: 14.8 sec;   INR: 1.32 ratio         PTT - ( 23 Apr 2021 07:03 )  PTT:85.8 sec          COVID-19 PCR: Detected (17 Apr 2021 19:13)      RADIOLOGY & ADDITIONAL TESTS:  Imaging from Last 24 Hours:    Electrocardiogram/QTc Interval:    COORDINATION OF CARE:  Care Discussed with Consultants/Other Providers:

## 2021-04-25 NOTE — PROGRESS NOTE ADULT - SUBJECTIVE AND OBJECTIVE BOX
Medicine Progress Note    Patient is a 71y old  Male who presents with a chief complaint of SOB and hypoxia, COVID (2021 14:22)      SUBJECTIVE / OVERNIGHT EVENTS: no changes overnight, remains on high flow; appears comfortable, minimal accessory muscle use    ADDITIONAL REVIEW OF SYSTEMS:    MEDICATIONS  (STANDING):  aspirin enteric coated 81 milliGRAM(s) Oral daily  atorvastatin 40 milliGRAM(s) Oral at bedtime  clopidogrel Tablet 75 milliGRAM(s) Oral daily  dexAMETHasone  Injectable 6 milliGRAM(s) IV Push every 24 hours  dextrose 40% Gel 15 Gram(s) Oral once  dextrose 5%. 1000 milliLiter(s) (50 mL/Hr) IV Continuous <Continuous>  dextrose 5%. 1000 milliLiter(s) (100 mL/Hr) IV Continuous <Continuous>  dextrose 50% Injectable 25 Gram(s) IV Push once  dextrose 50% Injectable 12.5 Gram(s) IV Push once  dextrose 50% Injectable 25 Gram(s) IV Push once  glucagon  Injectable 1 milliGRAM(s) IntraMuscular once  heparin   Injectable 5500 Unit(s) IV Push once  heparin  Infusion.  Unit(s)/Hr (12 mL/Hr) IV Continuous <Continuous>  insulin glargine Injectable (LANTUS) 18 Unit(s) SubCutaneous at bedtime  insulin lispro (ADMELOG) corrective regimen sliding scale   SubCutaneous three times a day before meals  insulin lispro (ADMELOG) corrective regimen sliding scale   SubCutaneous at bedtime  metoprolol tartrate 12.5 milliGRAM(s) Oral two times a day  pantoprazole    Tablet 40 milliGRAM(s) Oral before breakfast  remdesivir  IVPB 100 milliGRAM(s) IV Intermittent every 24 hours  tamsulosin 0.4 milliGRAM(s) Oral at bedtime    MEDICATIONS  (PRN):  acetaminophen   Tablet .. 650 milliGRAM(s) Oral every 4 hours PRN Temp greater or equal to 38.5C (101.3F)  ALBUTerol    90 MICROgram(s) HFA Inhaler 2 Puff(s) Inhalation every 4 hours PRN Shortness of Breath and/or Wheezing  guaiFENesin   Syrup  (Sugar-Free) 100 milliGRAM(s) Oral every 6 hours PRN Cough  heparin   Injectable 5500 Unit(s) IV Push every 6 hours PRN For aPTT less than 40  heparin   Injectable 2500 Unit(s) IV Push every 6 hours PRN For aPTT between 40 - 57    CAPILLARY BLOOD GLUCOSE      POCT Blood Glucose.: 132 mg/dL (2021 13:09)  POCT Blood Glucose.: 160 mg/dL (2021 09:25)  POCT Blood Glucose.: 210 mg/dL (2021 22:41)  POCT Blood Glucose.: 263 mg/dL (2021 21:26)    I&O's Summary      PHYSICAL EXAM:  Vital Signs Last 24 Hrs  T(C): 36.6 (2021 18:18), Max: 37.4 (2021 22:51)  T(F): 97.8 (2021 18:18), Max: 99.4 (2021 07:19)  HR: 81 (2021 18:18) (71 - 874)  BP: 137/75 (2021 18:18) (121/69 - 148/87)  BP(mean): --  RR: 24 (2021 18:18) (18 - 24)  SpO2: 92% (2021 18:18) (92% - 100%)  CONSTITUTIONAL: NAD, well-developed, well-groomed  ENMT: Moist oral mucosa, no pharyngeal injection or exudates; normal dentition  RESPIRATORY: decreased bs b/l  CARDIOVASCULAR: Regular rate and rhythm, normal S1 and S2, no murmur/rub/gallop; No lower extremity edema; Peripheral pulses are 2+ bilaterally  ABDOMEN: Nontender to palpation, normoactive bowel sounds, no rebound/guarding; No hepatosplenomegaly  PSYCH: A+O to person, place, and time; affect appropriate  NEUROLOGY: CN 2-12 are intact and symmetric; no gross sensory deficits   SKIN: No rashes; no palpable lesions    LABS:                        14.4   11.19 )-----------( 174      ( 2021 07:05 )             42.5     04-25    134<L>  |  100  |  30<H>  ----------------------------<  194<H>  4.5   |  21<L>  |  0.58    Ca    8.1<L>      2021 07:05  Phos  3.1     04-25  Mg     2.3     04-25    TPro  5.9<L>  /  Alb  2.6<L>  /  TBili  1.4<H>  /  DBili  0.6<H>  /  AST  20  /  ALT  23  /  AlkPhos  150<H>  -    PT/INR - ( 2021 07:05 )   PT: 15.5 sec;   INR: 1.37 ratio         PTT - ( 2021 07:05 )  PTT:96.7 sec      Urinalysis Basic - ( 2021 16:30 )    Color: Yellow / Appearance: Clear / S.031 / pH: x  Gluc: x / Ketone: Negative  / Bili: Negative / Urobili: 3 mg/dL   Blood: x / Protein: Trace / Nitrite: Negative   Leuk Esterase: Negative / RBC: 3 /HPF / WBC 4 /HPF   Sq Epi: x / Non Sq Epi: 1 /HPF / Bacteria: Negative        COVID-19 PCR: Detected (2021 19:13)      RADIOLOGY & ADDITIONAL TESTS:  Imaging from Last 24 Hours:    Electrocardiogram/QTc Interval:    COORDINATION OF CARE:  Care Discussed with Consultants/Other Providers:

## 2021-04-25 NOTE — PROGRESS NOTE ADULT - PROBLEM SELECTOR PLAN 2
- CXR with b/l patchy opacities.  - Continue O2 supplementation via HFNC + NRB. Wean as tolerated.   - Continue Decadron 6 mg daily through 4/26.   - Completed remdesivir on 4/21, complete additional 5 day course.   - D/w ID, pt not candidate for toci at this time   - Robitussin PRN for cough  - Albuterol PRN for SOB/wheezing   - Airborne and contact precautions  - On full dose AC for Afib with hep gtt  - Tylenol PRN for fever

## 2021-04-26 NOTE — PROVIDER CONTACT NOTE (OTHER) - SITUATION
Patient desat while on hiflow, NRB, Provider notified. Instructed to place patient on bipap. Respiratory therapist contacted immediately, provided informed RN/RT on bipap setting. Pt on bipap at 100%

## 2021-04-26 NOTE — PROGRESS NOTE ADULT - SUBJECTIVE AND OBJECTIVE BOX
Medicine Progress Note    Patient is a 71y old  Male who presents with a chief complaint of SOB and hypoxia, COVID (2021 18:22)      SUBJECTIVE / OVERNIGHT EVENTS: remains on high flow and NRB, appears comfortable    ADDITIONAL REVIEW OF SYSTEMS:    MEDICATIONS  (STANDING):  aspirin enteric coated 81 milliGRAM(s) Oral daily  atorvastatin 40 milliGRAM(s) Oral at bedtime  clopidogrel Tablet 75 milliGRAM(s) Oral daily  dexAMETHasone  Injectable 6 milliGRAM(s) IV Push every 24 hours  dextrose 40% Gel 15 Gram(s) Oral once  dextrose 5%. 1000 milliLiter(s) (50 mL/Hr) IV Continuous <Continuous>  dextrose 5%. 1000 milliLiter(s) (100 mL/Hr) IV Continuous <Continuous>  dextrose 50% Injectable 25 Gram(s) IV Push once  dextrose 50% Injectable 25 Gram(s) IV Push once  dextrose 50% Injectable 12.5 Gram(s) IV Push once  glucagon  Injectable 1 milliGRAM(s) IntraMuscular once  heparin   Injectable 5500 Unit(s) IV Push once  heparin  Infusion.  Unit(s)/Hr (12 mL/Hr) IV Continuous <Continuous>  insulin glargine Injectable (LANTUS) 18 Unit(s) SubCutaneous at bedtime  insulin lispro (ADMELOG) corrective regimen sliding scale   SubCutaneous three times a day before meals  insulin lispro (ADMELOG) corrective regimen sliding scale   SubCutaneous at bedtime  metoprolol tartrate 12.5 milliGRAM(s) Oral two times a day  pantoprazole    Tablet 40 milliGRAM(s) Oral before breakfast  remdesivir  IVPB 100 milliGRAM(s) IV Intermittent every 24 hours  tamsulosin 0.4 milliGRAM(s) Oral at bedtime    MEDICATIONS  (PRN):  acetaminophen   Tablet .. 650 milliGRAM(s) Oral every 4 hours PRN Temp greater or equal to 38.5C (101.3F)  ALBUTerol    90 MICROgram(s) HFA Inhaler 2 Puff(s) Inhalation every 4 hours PRN Shortness of Breath and/or Wheezing  guaiFENesin   Syrup  (Sugar-Free) 100 milliGRAM(s) Oral every 6 hours PRN Cough  heparin   Injectable 5500 Unit(s) IV Push every 6 hours PRN For aPTT less than 40  heparin   Injectable 2500 Unit(s) IV Push every 6 hours PRN For aPTT between 40 - 57    CAPILLARY BLOOD GLUCOSE      POCT Blood Glucose.: 176 mg/dL (2021 09:24)  POCT Blood Glucose.: 216 mg/dL (2021 22:18)  POCT Blood Glucose.: 207 mg/dL (2021 21:53)  POCT Blood Glucose.: 168 mg/dL (2021 18:35)  POCT Blood Glucose.: 132 mg/dL (2021 13:09)    I&O's Summary    2021 07:01  -  2021 07:00  --------------------------------------------------------  IN: 0 mL / OUT: 650 mL / NET: -650 mL        PHYSICAL EXAM:  Vital Signs Last 24 Hrs  T(C): 37.2 (2021 11:14), Max: 37.2 (2021 11:14)  T(F): 98.9 (2021 11:14), Max: 98.9 (2021 11:14)  HR: 87 (2021 11:55) (69 - 874)  BP: 126/82 (2021 11:14) (126/82 - 148/87)  BP(mean): 94 (2021 22:08) (94 - 94)  RR: 22 (2021 11:53) (18 - 24)  SpO2: 93% (2021 11:55) (92% - 100%)  CONSTITUTIONAL: NAD, well-developed, well-groomed  ENMT: Moist oral mucosa, no pharyngeal injection or exudates; normal dentition  RESPIRATORY: no accessory muscle use, decreased bs b/l  CARDIOVASCULAR: +s1s2  ABDOMEN: Nontender to palpation, normoactive bowel sounds, no rebound/guarding; No hepatosplenomegaly  PSYCH: A+O to person, place, and time; affect appropriate  NEUROLOGY: CN 2-12 are intact and symmetric; no gross sensory deficits   SKIN: No rashes; no palpable lesions    LABS:                        14.6   10.60 )-----------( 161      ( 2021 06:55 )             43.1         133<L>  |  102  |  34<H>  ----------------------------<  212<H>  4.6   |  21<L>  |  0.54    Ca    8.0<L>      2021 06:55  Phos  3.1       Mg     2.4         TPro  6.0  /  Alb  2.3<L>  /  TBili  1.3<H>  /  DBili  0.4<H>  /  AST  20  /  ALT  22  /  AlkPhos  166<H>      PT/INR - ( 2021 06:55 )   PT: 16.9 sec;   INR: 1.51 ratio         PTT - ( 2021 06:55 )  PTT:169.9 sec      Urinalysis Basic - ( 2021 16:30 )    Color: Yellow / Appearance: Clear / S.031 / pH: x  Gluc: x / Ketone: Negative  / Bili: Negative / Urobili: 3 mg/dL   Blood: x / Protein: Trace / Nitrite: Negative   Leuk Esterase: Negative / RBC: 3 /HPF / WBC 4 /HPF   Sq Epi: x / Non Sq Epi: 1 /HPF / Bacteria: Negative        Culture - Blood (collected 2021 18:16)  Source: .Blood Blood  Preliminary Report (2021 19:02):    No growth to date.      COVID-19 PCR: Detected (2021 19:13)      RADIOLOGY & ADDITIONAL TESTS:  Imaging from Last 24 Hours:    Electrocardiogram/QTc Interval:    COORDINATION OF CARE:  Care Discussed with Consultants/Other Providers:

## 2021-04-26 NOTE — PROGRESS NOTE ADULT - PROBLEM SELECTOR PLAN 3
Per patient, no previous hx of afib. Patient was in sinus tachycardia/NSR when he initially came to ED. Patient then converted to afib. HR: 90s-100s.   [id number] score of 4, therefore started on full dose AC.   - Hep gtt started, will transition to NOAC when stable.   - Started low dose Lopressor 12.5mg q12 , with HOLD parameters.   - Monitor on telemetry.   - Cardiology consult obtained, appreciate recommendations.

## 2021-04-26 NOTE — PROGRESS NOTE ADULT - PROBLEM SELECTOR PLAN 2
- CXR with b/l patchy opacities.  - Continue O2 supplementation via HFNC + NRB. Wean as tolerated.   - Continue Decadron 6 mg daily through 4/26.   - Completed remdesivir on 4/21, complete additional 5 day course.   - Albuterol PRN for SOB/wheezing   - Airborne and contact precautions  - On full dose AC for Afib with hep gtt  - Tylenol PRN for fever

## 2021-04-26 NOTE — PROVIDER CONTACT NOTE (OTHER) - ACTION/TREATMENT ORDERED:
Respiratory therapist contacted immediately, provided informed RN/RT on bipap setting. Pt on bipap at 100%

## 2021-04-27 NOTE — PROGRESS NOTE ADULT - PROBLEM SELECTOR PLAN 8
- VTE ppx: on heparin gtt  - BPH: c/w Flomax  Spoke w/ granddaughter Ashely on 4/27 and updated plan of care  Reviewed GOC, pt is full code  Pt is high risk for intubation at this time, prognosis guarded

## 2021-04-27 NOTE — PROGRESS NOTE ADULT - PROBLEM SELECTOR PLAN 1
Patient was 53% on RA at home --> 89-95% on 100% non-rebreather --> upgraded to 100% HFNC. Currently, he is satting 100% with HFNC with non-rebreather.   - Continue pulse ox monitoring.   - Continue to attempt to wean off oxygen as tolerated.   - Plan as below for Pneumonia 2/2 COVID 19.
Patient was 53% on RA at home --> 89-95% on 100% non-rebreather --> upgraded to 60/100% HFNC. Currently, he is satting 100% with HFNC with non-rebreather.   - Continue pulse ox monitoring.   - Continue to attempt to wean off oxygen as tolerated.   - Plan as below for Pneumonia 2/2 COVID 19.
Patient was 53% on RA at home --> 89-95% on 100% non-rebreather --> upgraded to 60/100% HFNC. Currently, he is satting 100% with HFNC with non-rebreather.   - Continue pulse ox monitoring.   -follow ABG, MICU following, appears unchanged
Patient was 53% on RA at home --> 89-95% on 100% non-rebreather --> upgraded to 60/100% HFNC. Currently, he is satting 100% with HFNC with non-rebreather.   - Continue pulse ox monitoring.   - Continue to attempt to wean off oxygen as tolerated.   - Plan as below for Pneumonia 2/2 COVID 19.
Pt remains on HFNC with non-rebreather.   - Continue pulse ox monitoring.   -follow ABG, MICU following, appears unchanged
Pt remains on HFNC, able to decreased to 50% FIo2, also removed NRB  - Continue pulse ox monitoring.   -follow ABG, MICU following, appears unchanged
Patient was 53% on RA at home --> 89-95% on 100% non-rebreather --> upgraded to 60/100% HFNC. Currently, he is satting 100% with HFNC with non-rebreather.   - Continue pulse ox monitoring.   - Continue to attempt to wean off oxygen as tolerated.   - Plan as below for Pneumonia 2/2 COVID 19.  -pt appears comfortable. no accessory muscle use
Patient was 53% on RA at home --> 89-95% on 100% non-rebreather --> upgraded to 60/100% HFNC. Currently, he is satting 100% with HFNC with non-rebreather.   - Continue pulse ox monitoring.   -check ABG, cxr, MICU consult, check Bcx

## 2021-04-27 NOTE — DIETITIAN INITIAL EVALUATION ADULT. - ORAL INTAKE PTA/DIET HISTORY
Attempted to visit pt twice, sleeping on both occasions; comprehensive chart review completed. Pt with NKFA, no noted micronutrient supplementation PTA per H&P. Pt with history of type 2 DM, on metformin/jardiance PTA, HbA1c 8.4%. Unable to assess adherence to therapeutic diet.

## 2021-04-27 NOTE — PROGRESS NOTE ADULT - PROBLEM SELECTOR PROBLEM 1
Acute respiratory failure with hypoxemia

## 2021-04-27 NOTE — DIETITIAN INITIAL EVALUATION ADULT. - OTHER INFO
Pt weaned from NRB to HFNC today, s/p dexamethasone and remdesivir. BGs currently managed with Lantus 18U/ISS and therapeutic diet.  No noted GI distress, last BM 4/24 per flowsheets, no active bowel regimen ordered.  Dosing weight (4/18) 155 lbs, no history available via chart.

## 2021-04-27 NOTE — PROGRESS NOTE ADULT - PROBLEM SELECTOR PLAN 3
Per patient, no previous hx of afib. Patient was in sinus tachycardia/NSR when he initially came to ED. Patient then converted to afib. HR: 90s-100s.   [id number] score of 4, therefore started on full dose AC.   - Hep gtt started, will transition to NOAC when stable.   - Started low dose Lopressor 12.5mg q12 , with HOLD parameters.   - Monitor on telemetry.

## 2021-04-27 NOTE — DIETITIAN INITIAL EVALUATION ADULT. - PERTINENT LABORATORY DATA
(4/27) Na 133<L>, BUN 43<H>, Cr 0.59, <H>, K+ 4.9, Alk Phos 197<H>, ALT/SGPT 21, AST/SGOT 15. (4/19) HbA1c 8.4%<H>. POCT: (4/27) 206, (4/26) 138-176.

## 2021-04-27 NOTE — PROGRESS NOTE ADULT - PROBLEM SELECTOR PLAN 2
- CXR with b/l patchy opacities.  Completed 10 day course of remdesivir on 4/21, completed 10 days of steroids   - Albuterol PRN for SOB/wheezing   - Airborne and contact precautions  - On full dose AC for Afib with hep gtt  - Tylenol PRN for fever

## 2021-04-27 NOTE — PROGRESS NOTE ADULT - SUBJECTIVE AND OBJECTIVE BOX
Medicine Progress Note    Patient is a 71y old  Male who presents with a chief complaint of SOB and hypoxia, COVID (26 Apr 2021 12:52)      SUBJECTIVE / OVERNIGHT EVENTS: able to wean off the NRB, no acute overnight events    ADDITIONAL REVIEW OF SYSTEMS:    MEDICATIONS  (STANDING):  aspirin enteric coated 81 milliGRAM(s) Oral daily  atorvastatin 40 milliGRAM(s) Oral at bedtime  clopidogrel Tablet 75 milliGRAM(s) Oral daily  dextrose 40% Gel 15 Gram(s) Oral once  dextrose 5%. 1000 milliLiter(s) (50 mL/Hr) IV Continuous <Continuous>  dextrose 5%. 1000 milliLiter(s) (100 mL/Hr) IV Continuous <Continuous>  dextrose 50% Injectable 25 Gram(s) IV Push once  dextrose 50% Injectable 12.5 Gram(s) IV Push once  dextrose 50% Injectable 25 Gram(s) IV Push once  glucagon  Injectable 1 milliGRAM(s) IntraMuscular once  heparin   Injectable 5500 Unit(s) IV Push once  heparin  Infusion.  Unit(s)/Hr (12 mL/Hr) IV Continuous <Continuous>  insulin glargine Injectable (LANTUS) 18 Unit(s) SubCutaneous at bedtime  insulin lispro (ADMELOG) corrective regimen sliding scale   SubCutaneous three times a day before meals  insulin lispro (ADMELOG) corrective regimen sliding scale   SubCutaneous at bedtime  metoprolol tartrate 12.5 milliGRAM(s) Oral two times a day  pantoprazole    Tablet 40 milliGRAM(s) Oral before breakfast  tamsulosin 0.4 milliGRAM(s) Oral at bedtime    MEDICATIONS  (PRN):  acetaminophen   Tablet .. 650 milliGRAM(s) Oral every 4 hours PRN Temp greater or equal to 38.5C (101.3F)  ALBUTerol    90 MICROgram(s) HFA Inhaler 2 Puff(s) Inhalation every 4 hours PRN Shortness of Breath and/or Wheezing  guaiFENesin   Syrup  (Sugar-Free) 100 milliGRAM(s) Oral every 6 hours PRN Cough  heparin   Injectable 5500 Unit(s) IV Push every 6 hours PRN For aPTT less than 40  heparin   Injectable 2500 Unit(s) IV Push every 6 hours PRN For aPTT between 40 - 57    CAPILLARY BLOOD GLUCOSE      POCT Blood Glucose.: 206 mg/dL (27 Apr 2021 09:25)  POCT Blood Glucose.: 151 mg/dL (26 Apr 2021 21:12)  POCT Blood Glucose.: 138 mg/dL (26 Apr 2021 17:40)  POCT Blood Glucose.: 156 mg/dL (26 Apr 2021 13:25)    I&O's Summary      PHYSICAL EXAM:  Vital Signs Last 24 Hrs  T(C): 37 (27 Apr 2021 04:00), Max: 37 (26 Apr 2021 22:01)  T(F): 98.6 (27 Apr 2021 04:00), Max: 98.6 (26 Apr 2021 22:01)  HR: 76 (27 Apr 2021 07:45) (71 - 87)  BP: 132/77 (27 Apr 2021 04:00) (126/77 - 132/77)  BP(mean): --  RR: 18 (27 Apr 2021 10:26) (18 - 22)  SpO2: 99% (27 Apr 2021 10:26) (93% - 100%)  CONSTITUTIONAL: NAD, well-developed, well-groomed  ENMT: Moist oral mucosa, no pharyngeal injection or exudates; normal dentition  RESPIRATORY: coarse breath sounds, no accessory muscle use  CARDIOVASCULAR: Regular rate and rhythm, normal S1 and S2, no murmur/rub/gallop; No lower extremity edema;  ABDOMEN: Nontender to palpation, normoactive bowel sounds, no rebound/guarding; No hepatosplenomegaly  PSYCH: A+O to person, place, and time; affect appropriate  NEUROLOGY: CN 2-12 are intact and symmetric; no gross sensory deficits   SKIN: No rashes; no palpable lesions    LABS:                        15.0   11.48 )-----------( 223      ( 27 Apr 2021 08:03 )             44.3     04-27    133<L>  |  101  |  43<H>  ----------------------------<  213<H>  4.9   |  20<L>  |  0.59    Ca    8.3<L>      27 Apr 2021 08:03  Phos  3.1     04-26  Mg     2.4     04-26    TPro  6.2  /  Alb  2.4<L>  /  TBili  1.2  /  DBili  0.6<H>  /  AST  15  /  ALT  21  /  AlkPhos  197<H>  04-27    PT/INR - ( 27 Apr 2021 01:08 )   PT: 15.3 sec;   INR: 1.36 ratio         PTT - ( 27 Apr 2021 09:14 )  PTT:33.9 sec          Culture - Blood (collected 24 Apr 2021 18:16)  Source: .Blood Blood  Preliminary Report (25 Apr 2021 19:02):    No growth to date.      COVID-19 PCR: Detected (17 Apr 2021 19:13)      RADIOLOGY & ADDITIONAL TESTS:  Imaging from Last 24 Hours:    Electrocardiogram/QTc Interval:    COORDINATION OF CARE:  Care Discussed with Consultants/Other Providers:

## 2021-04-27 NOTE — DIETITIAN INITIAL EVALUATION ADULT. - PHYSCIAL ASSESSMENT
No pressure injuries noted at this time.   Pt with no overt signs of muscle wasting/fat loss upon visualization.

## 2021-04-27 NOTE — DIETITIAN INITIAL EVALUATION ADULT. - ADD RECOMMEND
3) Nutrition education deferred in setting of clinical condition, RDN remains available to provide as appropriate.                                                                             4) Monitor BMs, provide bowel regimen as appropriate.                                                                                           5) Monitor PO intake, tolerance to nutrition supplement, weight trends, GI distress, hydration status, skin integrity.

## 2021-04-27 NOTE — DIETITIAN INITIAL EVALUATION ADULT. - REASON FOR ADMISSION
Per chart, pt is 71 year old Yi speaking male PMHx HTN, HLD, CAD s/p stents (2011, on ASA and Plavix), DM presenting with SOB, hypoxia admitted for acute hypoxic respiratory failure secondary to PNA likely secondary to COVID-19.

## 2021-04-27 NOTE — DIETITIAN INITIAL EVALUATION ADULT. - ORAL NUTRITION SUPPLEMENTS
2) Recommend addition of Glucerna 1 PO 2x daily (provides 220 kcal, 10 gm protein per 8oz serving) to assist pt in meeting estimated needs.

## 2021-04-27 NOTE — PROGRESS NOTE ADULT - PROBLEM SELECTOR PROBLEM 2
Pneumonia due to COVID-19 virus

## 2021-04-27 NOTE — DIETITIAN INITIAL EVALUATION ADULT. - PERTINENT MEDS FT
atorvastatin, LANTUS 18 Unit(s) at bedtime, ADMELOG corrective regimen sliding scale, pantoprazole Tablet

## 2021-04-27 NOTE — DIETITIAN INITIAL EVALUATION ADULT. - CONTINUE CURRENT NUTRITION CARE PLAN
1) Recommend liberalized diet to maximize menu item availability- Consistent Carbohydrate with evening snack, Low Sodium.

## 2021-04-27 NOTE — PROGRESS NOTE ADULT - PROBLEM SELECTOR PROBLEM 4
Elevated troponin

## 2021-04-28 NOTE — PROGRESS NOTE ADULT - SUBJECTIVE AND OBJECTIVE BOX
ICU Daily PROGRESS NOTE  ===============================  HPI  71y Male  (Insert from previous note)***    Interval Events: ***    VITAL SIGNS, INS/OUTS (last 24 hours):  --------------------------------------------------------------------------------------  T(C): 35.8 (21 @ 12:00), Max: 37.4 (21 @ 04:15)  HR: 85 (21 @ 15:29) (77 - 115)  BP: 102/76 (21 @ 04:30) (91/70 - 146/79)  BP(mean): 85 (21 @ 04:30) (77 - 85)  ABP: 112/56 (21 @ 15:00) (94/45 - 144/87)  ABP(mean): 76 (21 @ 15:00) (63 - 108)  RR: 24 (21 @ 15:00) (17 - 27)  SpO2: 100% (21 @ 15:29) (95% - 100%)  CAPILLARY BLOOD GLUCOSE      POCT Blood Glucose.: 232 mg/dL (2021 12:10)  POCT Blood Glucose.: 196 mg/dL (2021 05:51)  POCT Blood Glucose.: 208 mg/dL (2021 03:01)  POCT Blood Glucose.: 169 mg/dL (2021 21:43)  POCT Blood Glucose.: 159 mg/dL (2021 18:09)   N/A       @ 07:01  -   @ 07:00  --------------------------------------------------------  IN:    Enteral Tube Flush: 60 mL    FentaNYL: 8.4 mL    Heparin Infusion: 14 mL    IV PiggyBack: 1250 mL    Norepinephrine: 208.2 mL    Propofol: 42.2 mL  Total IN: 1582.8 mL    OUT:    Indwelling Catheter - Urethral (mL): 350 mL  Total OUT: 350 mL    Total NET: 1232.8 mL       @ 07:01  -   @ 16:10  --------------------------------------------------------  IN:  Total IN: 0 mL    OUT:    Indwelling Catheter - Urethral (mL): 470 mL  Total OUT: 470 mL    Total NET: -470 mL  --------------------------------------------------------------------------------------    EXAM  NEUROLOGY  Exam: Normal, NAD, alert, oriented x3, no focal deficits. PERRLA.   ***    RESPIRATORY  Exam: Lungs clear to auscultation, Normal expansion/effort.  ***  [] Tracheostomy   [] Intubated  Mechanical Ventilation: Mode: AC/ CMV (Assist Control/ Continuous Mandatory Ventilation), RR (machine): 24, TV (machine): 380, FiO2: 80, PEEP: 10, ITime: 0.76, MAP: 17, PIP: 32    CARDIOVASCULAR  Exam: S1, S2.  Regular rate and rhythm ****    GI/NUTRITION  Exam: Abdomen soft, Non-tender, Non-distended.  Gastrostomy / Jejunostomy tube in place.  Nasogastric tube in place.  Colostomy / Ileostomy.  ***  Wound: ***  Current Diet:  ***    METABOLIC/FLUIDS/ELECTROLYTES  dextrose 5%. 1000 milliLiter(s) IV Continuous <Continuous>  dextrose 5%. 1000 milliLiter(s) IV Continuous <Continuous>      HEMATOLOGIC  [x] DVT Prophylaxis: aspirin  chewable 81 milliGRAM(s) Oral daily  clopidogrel Tablet 75 milliGRAM(s) Oral daily  heparin   Injectable 5500 Unit(s) IV Push once  heparin  Infusion.  Unit(s)/Hr IV Continuous <Continuous>    Transfusions:	[] PRBC	[] Platelets		[] FFP	[] Cryoprecipitate    INFECTIOUS DISEASE  Antimicrobials/Immunologic Medications:  piperacillin/tazobactam IVPB.. 3.375 Gram(s) IV Intermittent every 8 hours  vancomycin  IVPB 1000 milliGRAM(s) IV Intermittent every 12 hours  vancomycin  IVPB        Day# of  ***    VASCULAR  Exam: Extremities warm, pink, well-perfused.  ****    MUSCULOSKELETAL  Exam: All extremities moving spontaneously without limitations.  ***    SKIN  Exam: Good skin turgor, no skin breakdown.  ***    LABS  --------------------------------------------------------------------------------------  CBC ( @ 05:57)                              13.6                           26.57<H>  )----------------(  290        99.1<H>% Neutrophils, 0.9<L>% Lymphocytes, ANC: 26.33<H>                              40.0    CBC ( @ 08:03)                              15.0                           11.48<H>  )----------------(  223        --    % Neutrophils, --    % Lymphocytes, ANC: --                                  44.3      BMP ( @ 05:57)             135     |  102     |  52<H> 		Ca++ --      Ca 7.3<L>             ---------------------------------( 212<H>		Mg 2.3                4.3     |  24      |  0.72  			Ph 4.7<H>  BMP ( @ 08:03)             133<L>  |  101     |  43<H> 		Ca++ --      Ca 8.3<L>             ---------------------------------( 213<H>		Mg --                 4.9     |  20<L>   |  0.59  			Ph --        LFTs ( @ 05:57)      TPro 5.4<L> / Alb 2.3<L> / TBili 1.0 / DBili -- / AST 17 / ALT 18 / AlkPhos 176<H>  LFTs ( @ 08:03)      TPro 6.2 / Alb 2.4<L> / TBili 1.2 / DBili 0.6<H> / AST 15 / ALT 21 / AlkPhos 197<H>    Coags ( @ 12:31)  aPTT 98.4<H> / INR -- / PT --  Coags ( @ 05:57)  aPTT 102.7<H> / INR 1.43<H> / PT 16.2<H>      ABG ( @ 15:24)     7.25<L> / 60<H> / 105 / 22 / -0.9 / 95.6%     Lactate:     ABG ( @ 11:09)     7.23<L> / 59<H> / 73<L> / 21<L> / -2.6<L> / 89.7<L>%     Lactate:         Urinalysis ( @ 05:57):     Color: Yellow / Appearance: Slightly Turbid<!> / S.036<H> / pH: 5.5 / Gluc: 100 mg/dL<!> / Ketones: Trace<!> / Bili: Negative / Urobili: 3 mg/dL<!> / Protein :30 mg/dL<!> / Nitrites: Negative / Leuk.Est: Negative / RBC: <5<H> / WBC: <5 / Sq Epi:  / Non Sq Epi: MANY / Bacteria Negative       -> .Sputum Sputum Culture ( @ 09:10)       Numerous polymorphonuclear leukocytes per low power field  Few Squamous epithelial cells per low power field  Few Gram Positive Cocci in Pairs and Chains per oil power field  Few Gram Positive Cocci in Clusters per oil power field  Moderate Gram Negative Rods per oil power field  Rare Yeast like cells per oil power field    NG  NG    -> .Blood Blood Culture (04-24 @ 18:16)     NG    NG    No growth to date.      --------------------------------------------------------------------------------------    IMAGING STUDIES    ASSESSMENT  71y Male ((insert from previous note))***    PLAN  Neurologic:   Respiratory:   Cardiovascular:   Gastrointestinal/Nutrition:   Renal/Genitourinary:   Hematologic:   Infectious Disease:   Endocrine:   Disposition:     --------------------------------------------------------------------------------------    Critical Care Diagnoses:

## 2021-04-28 NOTE — RAPID RESPONSE TEAM SUMMARY - NSSITUATIONBACKGROUNDRRT_GEN_ALL_CORE
72 yo M with PMH/ HTN, HLD, CAD s/p stents [2011, on ASA and Plavix], DM type 2, who was admitted for acute hypoxic respiratory failure 2/2 COVID PNA. Pt completed 10 day course of remdesivir on 4/21, completed 10 days of dexamethasone.    RRT was called for hypoxia to the 80s. On arrival, patient was saturating in the low 80s on 100% HiFlo NC and 15L NRB with increased WOB. Patient was afebrile, SBP 100s and  mg/dL. The patient's family confirmed that he was full code. He was also more altered in his hypoxic state. Anesthesia was called. Labs could not be drawn due to poor veins. Patient was sedated and intubated.

## 2021-04-28 NOTE — CHART NOTE - NSCHARTNOTEFT_GEN_A_CORE
Surge B Acceptance Note    ICU Accept Note  ---------------------------  Transfer from:   Accepted by: MICU  Accepting Physician:    HOSPITAL COURSE      OBJECTIVE --  Vital Signs Last 24 Hrs  T(C): 36.9 (27 Apr 2021 20:15), Max: 36.9 (27 Apr 2021 17:47)  T(F): 98.5 (27 Apr 2021 20:15), Max: 98.5 (27 Apr 2021 17:47)  HR: 104 (27 Apr 2021 23:26) (76 - 104)  BP: 141/76 (27 Apr 2021 20:15) (139/82 - 146/79)  BP(mean): --  RR: 20 (27 Apr 2021 23:26) (18 - 20)  SpO2: 97% (27 Apr 2021 23:26) (89% - 100%)    I&O's Summary      MEDICATIONS  (STANDING):  acetaminophen  IVPB .. 650 milliGRAM(s) IV Intermittent once  aspirin enteric coated 81 milliGRAM(s) Oral daily  atorvastatin 40 milliGRAM(s) Oral at bedtime  clopidogrel Tablet 75 milliGRAM(s) Oral daily  dextrose 40% Gel 15 Gram(s) Oral once  dextrose 5%. 1000 milliLiter(s) (50 mL/Hr) IV Continuous <Continuous>  dextrose 5%. 1000 milliLiter(s) (100 mL/Hr) IV Continuous <Continuous>  dextrose 50% Injectable 25 Gram(s) IV Push once  dextrose 50% Injectable 12.5 Gram(s) IV Push once  dextrose 50% Injectable 25 Gram(s) IV Push once  glucagon  Injectable 1 milliGRAM(s) IntraMuscular once  heparin   Injectable 5500 Unit(s) IV Push once  heparin  Infusion.  Unit(s)/Hr (12 mL/Hr) IV Continuous <Continuous>  insulin glargine Injectable (LANTUS) 18 Unit(s) SubCutaneous at bedtime  insulin lispro (ADMELOG) corrective regimen sliding scale   SubCutaneous three times a day before meals  insulin lispro (ADMELOG) corrective regimen sliding scale   SubCutaneous at bedtime  metoprolol tartrate 12.5 milliGRAM(s) Oral two times a day  pantoprazole    Tablet 40 milliGRAM(s) Oral before breakfast  sodium bicarbonate  Injectable 50 milliEquivalent(s) IV Push once  tamsulosin 0.4 milliGRAM(s) Oral at bedtime    MEDICATIONS  (PRN):  acetaminophen   Tablet .. 650 milliGRAM(s) Oral every 4 hours PRN Temp greater or equal to 38.5C (101.3F)  ALBUTerol    90 MICROgram(s) HFA Inhaler 2 Puff(s) Inhalation every 4 hours PRN Shortness of Breath and/or Wheezing  guaiFENesin   Syrup  (Sugar-Free) 100 milliGRAM(s) Oral every 6 hours PRN Cough  heparin   Injectable 5500 Unit(s) IV Push every 6 hours PRN For aPTT less than 40  heparin   Injectable 2500 Unit(s) IV Push every 6 hours PRN For aPTT between 40 - 57  sodium chloride 0.65% Nasal 1 Spray(s) Both Nostrils every 4 hours PRN Nasal Congestion      LABS                                            15.0                  Neurophils% (auto):   x      (04-27 @ 08:03):    11.48)-----------(223          Lymphocytes% (auto):  x                                             44.3                   Eosinphils% (auto):   x        Manual%: Neutrophils x    ; Lymphocytes x    ; Eosinophils x    ; Bands%: x    ; Blasts x                                    133    |  101    |  43                  Calcium: 8.3   / iCa: x      (04-27 @ 08:03)    ----------------------------<  213       Magnesium: x                                4.9     |  20     |  0.59             Phosphorous: x        TPro  6.2    /  Alb  2.4    /  TBili  1.2    /  DBili  0.6    /  AST  15     /  ALT  21     /  AlkPhos  197    27 Apr 2021 08:03    ( 04-27 @ 18:08 )   PT: x    ;   INR: x      aPTT: 31.7 sec      ASSESSMENT & PLAN:   71y-year-old Male with a past medical history of ______ , admitted for ___, now with _____, admitted to ICU for further assessment and management.     NEUROLOGY:  -   -    PULMONARY:  -  -    CARDIOLOGY:  -  -    GASTROINTESTINAL:  -  -    RENAL/:  -  -    INFECTIOUS DISEASE:  -   -    HEMATOLOGY:  -   -      ENDOCRINE  -    ETHICS/DISPOSITION:  -    - Transfer to ____ .     LINES/DRAINS/TUBES/DEVICES:  -  -    Above assesment and plan of care performed, created, and reviewed in real time with MICU attending physician  ____.     Conchis Philip PA-C [facility] COVID ICU Acceptance Note    ICU Accept Note  ---------------------------  Transfer from: [facility]  Accepted by: MICU  Accepting Physician: Dr Denson     [facility] COURSE: 70 yo M with PMH/ HTN, HLD, CAD s/p stents [2011, on ASA and Plavix], DM type 2, who was admitted for acute hypoxic respiratory failure 2/2 COVID PNA. Pt completed 10 day course of remdesivir on 4/21, completed 10 days of dexamethasone. RRT was called on 04/28/21 due to pt decompensated on HFNC and required intubation in the setting of ARDS/COVID PNA. Pt was transferred to [facility] unit for further assessment and management.       OBJECTIVE --  Vital Signs Last 24 Hrs  T(C): 36.9 (27 Apr 2021 20:15), Max: 36.9 (27 Apr 2021 17:47)  T(F): 98.5 (27 Apr 2021 20:15), Max: 98.5 (27 Apr 2021 17:47)  HR: 104 (27 Apr 2021 23:26) (76 - 104)  BP: 141/76 (27 Apr 2021 20:15) (139/82 - 146/79)  BP(mean): --  RR: 20 (27 Apr 2021 23:26) (18 - 20)  SpO2: 97% (27 Apr 2021 23:26) (89% - 100%)    I&O's Summary      MEDICATIONS  (STANDING):  acetaminophen  IVPB .. 650 milliGRAM(s) IV Intermittent once  aspirin enteric coated 81 milliGRAM(s) Oral daily  atorvastatin 40 milliGRAM(s) Oral at bedtime  clopidogrel Tablet 75 milliGRAM(s) Oral daily  dextrose 40% Gel 15 Gram(s) Oral once  dextrose 5%. 1000 milliLiter(s) (50 mL/Hr) IV Continuous <Continuous>  dextrose 5%. 1000 milliLiter(s) (100 mL/Hr) IV Continuous <Continuous>  dextrose 50% Injectable 25 Gram(s) IV Push once  dextrose 50% Injectable 12.5 Gram(s) IV Push once  dextrose 50% Injectable 25 Gram(s) IV Push once  glucagon  Injectable 1 milliGRAM(s) IntraMuscular once  heparin   Injectable 5500 Unit(s) IV Push once  heparin  Infusion.  Unit(s)/Hr (12 mL/Hr) IV Continuous <Continuous>  insulin glargine Injectable (LANTUS) 18 Unit(s) SubCutaneous at bedtime  insulin lispro (ADMELOG) corrective regimen sliding scale   SubCutaneous three times a day before meals  insulin lispro (ADMELOG) corrective regimen sliding scale   SubCutaneous at bedtime  metoprolol tartrate 12.5 milliGRAM(s) Oral two times a day  pantoprazole    Tablet 40 milliGRAM(s) Oral before breakfast  sodium bicarbonate  Injectable 50 milliEquivalent(s) IV Push once  tamsulosin 0.4 milliGRAM(s) Oral at bedtime    MEDICATIONS  (PRN):  acetaminophen   Tablet .. 650 milliGRAM(s) Oral every 4 hours PRN Temp greater or equal to 38.5C (101.3F)  ALBUTerol    90 MICROgram(s) HFA Inhaler 2 Puff(s) Inhalation every 4 hours PRN Shortness of Breath and/or Wheezing  guaiFENesin   Syrup  (Sugar-Free) 100 milliGRAM(s) Oral every 6 hours PRN Cough  heparin   Injectable 5500 Unit(s) IV Push every 6 hours PRN For aPTT less than 40  heparin   Injectable 2500 Unit(s) IV Push every 6 hours PRN For aPTT between 40 - 57  sodium chloride 0.65% Nasal 1 Spray(s) Both Nostrils every 4 hours PRN Nasal Congestion      LABS                                            15.0                  Neurophils% (auto):   x      (04-27 @ 08:03):    11.48)-----------(223          Lymphocytes% (auto):  x                                             44.3                   Eosinphils% (auto):   x        Manual%: Neutrophils x    ; Lymphocytes x    ; Eosinophils x    ; Bands%: x    ; Blasts x                                    133    |  101    |  43                  Calcium: 8.3   / iCa: x      (04-27 @ 08:03)    ----------------------------<  213       Magnesium: x                                4.9     |  20     |  0.59             Phosphorous: x        TPro  6.2    /  Alb  2.4    /  TBili  1.2    /  DBili  0.6    /  AST  15     /  ALT  21     /  AlkPhos  197    27 Apr 2021 08:03    ( 04-27 @ 18:08 )   PT: x    ;   INR: x      aPTT: 31.7 sec    70 yo M with PMH/ HTN, HLD, CAD s/p stents [2011, on ASA and Plavix], DM type 2, who was admitted for acute hypoxic respiratory failure 2/2 COVID PNA. Pt completed 10 day course of remdesivir on 4/21, completed 10 days of dexamethasone. RRT was called on 04/28/21 due to pt decompensated on HFNC and required intubation in the setting of ARDS/COVID PNA. Pt was placed on mechanical ventilation and was transferred to [facility] B unit for further assessment and management.     NEUROLOGY:  -   -    PULMONARY:  - mechanical ventilation   -    CARDIOLOGY:  -  -    GASTROINTESTINAL:  -  -    RENAL/:  -  -    INFECTIOUS DISEASE:  -   -    HEMATOLOGY:  -   -      ENDOCRINE  -    ETHICS/DISPOSITION:  -    - Transfer to ____ .     LINES/DRAINS/TUBES/DEVICES:  -  -    Above assesment and plan of care performed, created, and reviewed in real time with MICU attending physician  ____.     Conchis Philip PA-C [facility] COVID ICU Acceptance Note    ICU Accept Note  ---------------------------  Transfer from: [facility]  Accepted by: MICU  Accepting Physician: Dr Denson     [facility] COURSE: 72 yo M with PMH/ HTN, HLD, CAD s/p stents [2011, on ASA and Plavix], DM type 2, who was admitted for acute hypoxic respiratory failure 2/2 COVID PNA. Pt completed 10 day course of remdesivir on 4/21, completed 10 days of dexamethasone. RRT was called on 04/28/21 due to pt decompensated on HFNC and required intubation in the setting of ARDS/COVID PNA. Pt was transferred to [facility] unit for further assessment and management.       OBJECTIVE --  Vital Signs Last 24 Hrs  T(C): 36.9 (27 Apr 2021 20:15), Max: 36.9 (27 Apr 2021 17:47)  T(F): 98.5 (27 Apr 2021 20:15), Max: 98.5 (27 Apr 2021 17:47)  HR: 104 (27 Apr 2021 23:26) (76 - 104)  BP: 141/76 (27 Apr 2021 20:15) (139/82 - 146/79)  BP(mean): --  RR: 20 (27 Apr 2021 23:26) (18 - 20)  SpO2: 97% (27 Apr 2021 23:26) (89% - 100%)    I&O's Summary    ROS unable to obtain as pt intubated/sedated    PE:  NC/AT, anicteric sclera  Resp: intubated. b/l air entry  CV: RRR  Abd: soft, no distention.   Ext: no sig edema  Neuro: sedated     MEDICATIONS  (STANDING):  acetaminophen  IVPB .. 650 milliGRAM(s) IV Intermittent once  aspirin enteric coated 81 milliGRAM(s) Oral daily  atorvastatin 40 milliGRAM(s) Oral at bedtime  clopidogrel Tablet 75 milliGRAM(s) Oral daily  dextrose 40% Gel 15 Gram(s) Oral once  dextrose 5%. 1000 milliLiter(s) (50 mL/Hr) IV Continuous <Continuous>  dextrose 5%. 1000 milliLiter(s) (100 mL/Hr) IV Continuous <Continuous>  dextrose 50% Injectable 25 Gram(s) IV Push once  dextrose 50% Injectable 12.5 Gram(s) IV Push once  dextrose 50% Injectable 25 Gram(s) IV Push once  glucagon  Injectable 1 milliGRAM(s) IntraMuscular once  heparin   Injectable 5500 Unit(s) IV Push once  heparin  Infusion.  Unit(s)/Hr (12 mL/Hr) IV Continuous <Continuous>  insulin glargine Injectable (LANTUS) 18 Unit(s) SubCutaneous at bedtime  insulin lispro (ADMELOG) corrective regimen sliding scale   SubCutaneous three times a day before meals  insulin lispro (ADMELOG) corrective regimen sliding scale   SubCutaneous at bedtime  metoprolol tartrate 12.5 milliGRAM(s) Oral two times a day  pantoprazole    Tablet 40 milliGRAM(s) Oral before breakfast  sodium bicarbonate  Injectable 50 milliEquivalent(s) IV Push once  tamsulosin 0.4 milliGRAM(s) Oral at bedtime    MEDICATIONS  (PRN):  acetaminophen   Tablet .. 650 milliGRAM(s) Oral every 4 hours PRN Temp greater or equal to 38.5C (101.3F)  ALBUTerol    90 MICROgram(s) HFA Inhaler 2 Puff(s) Inhalation every 4 hours PRN Shortness of Breath and/or Wheezing  guaiFENesin   Syrup  (Sugar-Free) 100 milliGRAM(s) Oral every 6 hours PRN Cough  heparin   Injectable 5500 Unit(s) IV Push every 6 hours PRN For aPTT less than 40  heparin   Injectable 2500 Unit(s) IV Push every 6 hours PRN For aPTT between 40 - 57  sodium chloride 0.65% Nasal 1 Spray(s) Both Nostrils every 4 hours PRN Nasal Congestion      LABS                                            15.0                  Neurophils% (auto):   x      (04-27 @ 08:03):    11.48)-----------(223          Lymphocytes% (auto):  x                                             44.3                   Eosinphils% (auto):   x        Manual%: Neutrophils x    ; Lymphocytes x    ; Eosinophils x    ; Bands%: x    ; Blasts x                                    133    |  101    |  43                  Calcium: 8.3   / iCa: x      (04-27 @ 08:03)    ----------------------------<  213       Magnesium: x                                4.9     |  20     |  0.59             Phosphorous: x        TPro  6.2    /  Alb  2.4    /  TBili  1.2    /  DBili  0.6    /  AST  15     /  ALT  21     /  AlkPhos  197    27 Apr 2021 08:03    ( 04-27 @ 18:08 )   PT: x    ;   INR: x      aPTT: 31.7 sec    72 yo M with PMH/ HTN, HLD, CAD s/p stents [2011, on ASA and Plavix], DM type 2, who was admitted for acute hypoxic respiratory failure 2/2 COVID PNA. Pt completed 10 day course of remdesivir on 4/21, completed 10 days of dexamethasone. RRT was called on 04/28/21 due to pt decompensated on HFNC and required intubation in the setting of ARDS/COVID PNA. Pt was placed on mechanical ventilation and was transferred to [facility] B unit for further assessment and management.     ARF s/p COVID 19     NEUROLOGY:  - Vent synchrony with prop/fent  -    PULMONARY:  - mechanical ventilation   f/u abg  Completed 10 day course of remdesivir on 4/21, completed 10 days of steroids        CARDIOLOGY:  -on Hep drip for Afibb   c/w plavix, asa for CAD s/p stent in 2011  -rate control on lopressor   on Levo  Keep MAP >65    GASTROINTESTINAL:  -NPO for now.   -PPI for GI ppx    RENAL/:  -Cr stable  -Monitor I/Os  Replete electrolytes prn    INFECTIOUS DISEASE:  - WBC 10, afebrile  -no abx     HEMATOLOGY:  - on hep drip  -      ENDOCRINE  -ISS    ETHICS/DISPOSITION:  -    - Full CODE         Above assesment and plan of care performed, created, and reviewed in real time with MICU attending physician  ____. [facility] COVID ICU Acceptance Note    ICU Accept Note  ---------------------------  Transfer from: [facility]  Accepted by: MICU  Accepting Physician: Dr Denson     [facility] COURSE: 72 yo M with PMH/ HTN, HLD, CAD s/p stents [2011, on ASA and Plavix], DM type 2, who was admitted for acute hypoxic respiratory failure 2/2 COVID PNA. Pt completed 10 day course of remdesivir on 4/21, completed 10 days of dexamethasone. RRT was called on 04/28/21 due to pt decompensated on HFNC and required intubation in the setting of ARDS/COVID PNA. Pt was transferred to [facility] unit for further assessment and management.       OBJECTIVE --  Vital Signs Last 24 Hrs  T(C): 36.9 (27 Apr 2021 20:15), Max: 36.9 (27 Apr 2021 17:47)  T(F): 98.5 (27 Apr 2021 20:15), Max: 98.5 (27 Apr 2021 17:47)  HR: 104 (27 Apr 2021 23:26) (76 - 104)  BP: 141/76 (27 Apr 2021 20:15) (139/82 - 146/79)  BP(mean): --  RR: 20 (27 Apr 2021 23:26) (18 - 20)  SpO2: 97% (27 Apr 2021 23:26) (89% - 100%)    I&O's Summary    ROS unable to obtain as pt intubated/sedated    PE:  NC/AT, anicteric sclera  Resp: intubated. b/l air entry  CV: RRR  Abd: soft, no distention.   Ext: no sig edema  Neuro: sedated     MEDICATIONS  (STANDING):  acetaminophen  IVPB .. 650 milliGRAM(s) IV Intermittent once  aspirin enteric coated 81 milliGRAM(s) Oral daily  atorvastatin 40 milliGRAM(s) Oral at bedtime  clopidogrel Tablet 75 milliGRAM(s) Oral daily  dextrose 40% Gel 15 Gram(s) Oral once  dextrose 5%. 1000 milliLiter(s) (50 mL/Hr) IV Continuous <Continuous>  dextrose 5%. 1000 milliLiter(s) (100 mL/Hr) IV Continuous <Continuous>  dextrose 50% Injectable 25 Gram(s) IV Push once  dextrose 50% Injectable 12.5 Gram(s) IV Push once  dextrose 50% Injectable 25 Gram(s) IV Push once  glucagon  Injectable 1 milliGRAM(s) IntraMuscular once  heparin   Injectable 5500 Unit(s) IV Push once  heparin  Infusion.  Unit(s)/Hr (12 mL/Hr) IV Continuous <Continuous>  insulin glargine Injectable (LANTUS) 18 Unit(s) SubCutaneous at bedtime  insulin lispro (ADMELOG) corrective regimen sliding scale   SubCutaneous three times a day before meals  insulin lispro (ADMELOG) corrective regimen sliding scale   SubCutaneous at bedtime  metoprolol tartrate 12.5 milliGRAM(s) Oral two times a day  pantoprazole    Tablet 40 milliGRAM(s) Oral before breakfast  sodium bicarbonate  Injectable 50 milliEquivalent(s) IV Push once  tamsulosin 0.4 milliGRAM(s) Oral at bedtime    MEDICATIONS  (PRN):  acetaminophen   Tablet .. 650 milliGRAM(s) Oral every 4 hours PRN Temp greater or equal to 38.5C (101.3F)  ALBUTerol    90 MICROgram(s) HFA Inhaler 2 Puff(s) Inhalation every 4 hours PRN Shortness of Breath and/or Wheezing  guaiFENesin   Syrup  (Sugar-Free) 100 milliGRAM(s) Oral every 6 hours PRN Cough  heparin   Injectable 5500 Unit(s) IV Push every 6 hours PRN For aPTT less than 40  heparin   Injectable 2500 Unit(s) IV Push every 6 hours PRN For aPTT between 40 - 57  sodium chloride 0.65% Nasal 1 Spray(s) Both Nostrils every 4 hours PRN Nasal Congestion      LABS                                            15.0                  Neurophils% (auto):   x      (04-27 @ 08:03):    11.48)-----------(223          Lymphocytes% (auto):  x                                             44.3                   Eosinphils% (auto):   x        Manual%: Neutrophils x    ; Lymphocytes x    ; Eosinophils x    ; Bands%: x    ; Blasts x                                    133    |  101    |  43                  Calcium: 8.3   / iCa: x      (04-27 @ 08:03)    ----------------------------<  213       Magnesium: x                                4.9     |  20     |  0.59             Phosphorous: x        TPro  6.2    /  Alb  2.4    /  TBili  1.2    /  DBili  0.6    /  AST  15     /  ALT  21     /  AlkPhos  197    27 Apr 2021 08:03    ( 04-27 @ 18:08 )   PT: x    ;   INR: x      aPTT: 31.7 sec    72 yo M with PMH/ HTN, HLD, CAD s/p stents [2011, on ASA and Plavix], DM type 2, who was admitted for acute hypoxic respiratory failure 2/2 COVID PNA. Pt completed 10 day course of remdesivir on 4/21, completed 10 days of dexamethasone. RRT was called on 04/28/21 due to pt decompensated on HFNC and required intubation in the setting of ARDS/COVID PNA. Pt was placed on mechanical ventilation and was transferred to [facility] B unit for further assessment and management.     ARF s/p COVID 19     NEUROLOGY:  - Vent synchrony with prop/fent, titrate as needed   -no paralytics requirement at the present time     PULMONARY:  - mechanical ventilation -- VC 24/350/10/100  f/u abg  Completed 10 day course of remdesivir on 4/21, completed 10 days of steroids        CARDIOLOGY:  -on Hep drip for Afib__ monitor aPTT    c/w plavix, asa for CAD s/p stent in 2011  -rate control on lopressor __ hold for now   on Levo-- 0.3, Keep MAP >65  TTE-(04/21) - min AR, LV EF 57 %, nl LV and RV fx,     GASTROINTESTINAL:  -NPO for now.   -PPI for GI ppx    RENAL/:  -Cr stable  -Monitor I/Os  Replete electrolytes prn    INFECTIOUS DISEASE:  - WBC 10, afebrile  -observe off abx     HEMATOLOGY:  - on hep drip(DVT proph)   -      ENDOCRINE  -ISS    ETHICS/DISPOSITION:  -    - Full CODE         Above assessment and plan of care performed, created, and reviewed in real time with MICU attending physician Dr. Denson.

## 2021-04-28 NOTE — CHART NOTE - NSCHARTNOTEFT_GEN_A_CORE
Notified by RN, pt desatting to 50s w/ HFNC 50/100 only. NRB placed with improvement to 80s- low 90s,Pt seen and examined at bedside rectal temp obtained 99F and was given IV tylneol and increased to max settings of HFNC + NRBM. He appears more somnolent on examination than prior evaluation, mildly tachypneic, satting in the 80s at bedside monitor. Pt placed on L Lateral Decub and Chest PT administered by team and RN  however, unable to tolerate and began to desaturate even further. MICU called to inform of situation, made aware. Family called, Ashely (granddaughter) [phone], to confirm code status and update on patient's clinical status . Pt remains FULL Code as per family. Pt remains tachypneic, hypoxic to 88%, with increased work of breathing. Per MICU on  4-24 if patient's clinical status worsens would intubate. RRT called for hypoxia s/p interventions mentioned above. Anesthesia called overhead, and pt intubated and tx to surge b .Ashely (granddaughter) [phone] called, updated and all questions/concerns answered.     General: Somnolent, easily arousable to voice.   Heart: Tachycardic, irregularly irregular   Lungs: CTA b/l A +P, using accessory muscles, tachypneic   Abdomen: Soft, non tender + BS     Vital Signs Last 24 Hrs  T(C): 36.9 (27 Apr 2021 20:15), Max: 36.9 (27 Apr 2021 17:47)  T(F): 98.5 (27 Apr 2021 20:15), Max: 98.5 (27 Apr 2021 17:47)  HR: 104 (27 Apr 2021 23:26) (76 - 104)  BP: 141/76 (27 Apr 2021 20:15) (139/82 - 146/79)  RR: 20 (27 Apr 2021 23:26) (18 - 20)  SpO2: 97% (27 Apr 2021 23:26) (89% - 100%)

## 2021-04-29 NOTE — CHART NOTE - NSCHARTNOTEFT_GEN_A_CORE
NUTRITION FOLLOW-UP:  Pt. w PMH HTN, HLD, CAD, s/p stents, DM2, presenting with PNA due to COVID19.  Currently intubated/sedated.  Previously prescribed for Glucerna 1.5 @ 40mL/hr, which provided excessive caloric intake, considering additional 557 non-nutritive lipid calories from Propofol (rate of 21.1mL/hr x 24hrs).  However advised decreasing to 20mL/hr + 5 NoCarb Prosource per day to more appropriately meet estimated calorie & [increased] protein needs.  Enteral calories 720 + Propofol calories 557= 1277 total daily calories.   (Provides ~18 Kcals/kg & ~1.6g protein/kg actual body weight).  Verbalized enteral recommendation to PA... appreciate order change.  Also suggest Multivitamin for micronutrient coverage.    Hypoactive bowel sounds, per flow sheets. Last noted BM (?).... no vomiting/diarrhea/constipation documented @ this time.          Weight:                           Height: 68"                  Ideal Body Weight (IBW): 70kg   70.3kg (4/17)     Re-Calculated Estimated Calorie/Protein needs based on actual/admission weight.  15-20 Kcals/kg = 3549-7049 Kcals/day  1.6-1.8g protein/kg = 112-126g protein/day      Edema:  1+ dependent.     Skin:  No noted pressure injuries.       Pertinent Medications: MEDICATIONS  (STANDING):  aspirin  chewable 81 milliGRAM(s) Oral daily  atorvastatin 40 milliGRAM(s) Oral at bedtime  chlorhexidine 0.12% Liquid 15 milliLiter(s) Oral Mucosa every 12 hours  chlorhexidine 4% Liquid 1 Application(s) Topical <User Schedule>  cisatracurium Infusion 3 MICROgram(s)/kG/Min (12.7 mL/Hr) IV Continuous <Continuous>  clopidogrel Tablet 75 milliGRAM(s) Oral daily  dextrose 40% Gel 15 Gram(s) Oral once  dextrose 5%. 1000 milliLiter(s) (50 mL/Hr) IV Continuous <Continuous>  dextrose 5%. 1000 milliLiter(s) (100 mL/Hr) IV Continuous <Continuous>  dextrose 50% Injectable 25 Gram(s) IV Push once  dextrose 50% Injectable 12.5 Gram(s) IV Push once  dextrose 50% Injectable 25 Gram(s) IV Push once  enoxaparin Injectable 40 milliGRAM(s) SubCutaneous daily  fentaNYL   Infusion..... 0.5 MICROgram(s)/kG/Hr (0.7 mL/Hr) IV Continuous <Continuous>  glucagon  Injectable 1 milliGRAM(s) IntraMuscular once  hydrocortisone sodium succinate Injectable 50 milliGRAM(s) IV Push every 6 hours  insulin lispro (ADMELOG) corrective regimen sliding scale   SubCutaneous every 6 hours  insulin NPH human recombinant 4 Unit(s) SubCutaneous every 6 hours  norepinephrine Infusion 0.05 MICROgram(s)/kG/Min (3.3 mL/Hr) IV Continuous <Continuous>  pantoprazole  Injectable 40 milliGRAM(s) IV Push daily  piperacillin/tazobactam IVPB.. 3.375 Gram(s) IV Intermittent every 8 hours  polyethylene glycol 3350 17 Gram(s) Oral daily  propofol Infusion 20 MICROgram(s)/kG/Min (8.44 mL/Hr) IV Continuous <Continuous>  senna 2 Tablet(s) Oral at bedtime  vancomycin  IVPB      vancomycin  IVPB 1000 milliGRAM(s) IV Intermittent every 12 hours  vasopressin Infusion 0.04 Unit(s)/Min (2.4 mL/Hr) IV Continuous <Continuous>    MEDICATIONS  (PRN):  acetaminophen   Tablet .. 650 milliGRAM(s) Oral every 4 hours PRN Temp greater or equal to 38.5C (101.3F)  sodium chloride 0.65% Nasal 1 Spray(s) Both Nostrils every 4 hours PRN Nasal Congestion    Pertinent Labs:  04-29 Na131 mmol/L<L> Glu 153 mg/dL<H> K+ 4.9 mmol/L Cr  0.66 mg/dL BUN 35 mg/dL<H> 04-29 Phos 3.7 mg/dL 04-29 Alb 2.3 g/dL<L> 04-18 Chol 101 mg/dL LDL --    HDL 31 mg/dL<L> Trig 145 mg/dL      CAPILLARY BLOOD GLUCOSE      POCT Blood Glucose.: 232 mg/dL (29 Apr 2021 10:49)  POCT Blood Glucose.: 153 mg/dL (29 Apr 2021 05:17)  POCT Blood Glucose.: 142 mg/dL (28 Apr 2021 23:53)  POCT Blood Glucose.: 201 mg/dL (28 Apr 2021 17:10)        Diet, NPO with Tube Feed:   Tube Feeding Modality: Orogastric  Glucerna 1.5 Carlos (GLUCERNA1.5RTH)  Total Volume for 24 Hours (mL): 480  Continuous  Starting Tube Feed Rate {mL per Hour}: 10  Increase Tube Feed Rate by (mL): 10  Until Goal Tube Feed Rate (mL per Hour): 20  Tube Feed Duration (in Hours): 24  Tube Feed Start Time: 11:00  No Carb Prosource (1pkg = 15gms Protein)     Qty per Day:  5 (04-29-21 @ 10:21)              [provides 480mL total volume, 720 Kcals, total of 115g protein & 364mL free water, daily]           PLAN/RECOMMENDATIONS:    1) Continue current enteral regimen, as tolerated.   2) Obtain current & daily weights, as feasible.  3) Order Multivitamin     RDN remains available and will f/u PRN.          Layla Acevedo RDN, CDN pager [phone]

## 2021-04-29 NOTE — PROGRESS NOTE ADULT - SUBJECTIVE AND OBJECTIVE BOX
Subjective: Intubated, sedated, and medically-paralyzed w/ nimbex, fentanyl, and propofol drips.    Vital Signs:  Vital Signs Last 24 Hrs  T(C): 37.4 (04-29-21 @ 13:00), Max: 37.4 (04-29-21 @ 13:00)  T(F): 99.3 (04-29-21 @ 13:00), Max: 99.3 (04-29-21 @ 13:00)  HR: 91 (04-29-21 @ 13:00) (84 - 109)  BP: 144/62  RR: 28 (04-29-21 @ 13:00) (24 - 28)  SpO2: 97% (04-29-21 @ 13:00) (94% - 100%) on (O2)      General: Intubated, sedated, and medically-paralyzed  Eyes: Scleras clear, PERRLA  Neck: Neck supple, trachea midline, No JVD  Respiratory: coarse breath sounds B/L  CV: S1S2; no audible murmurs  Abdominal: +BS's x4, soft, ND/NT  Extremities: 1-2+ edema B/L LE's, + peripheral pulses  Skin: No Rashes, Hematoma, Ecchymosis                      13.3   20.18 )-----------( 188      ( 29 Apr 2021 00:50 )             40.5       131<L>  |  101  |  35<H>  ----------------------------<  153<H>  4.9   |  22  |  0.66    Ca    7.9<L>    Phos  3.7     Mg     2.3        TPro  5.4<L>  /  Alb  2.3<L>  /  TBili  1.0  /  DBili  x   /  AST  14  /  ALT  15  /  AlkPhos  120    PT: 13.8 sec;   INR: 1.22 ratio      PTT:98.4 sec      MEDICATIONS  (STANDING):  aspirin  chewable 81 milliGRAM(s) Oral daily  atorvastatin 40 milliGRAM(s) Oral at bedtime  chlorhexidine 0.12% Liquid 15 milliLiter(s) Oral Mucosa every 12 hours  chlorhexidine 4% Liquid 1 Application(s) Topical <User Schedule>  cisatracurium Infusion 3 MICROgram(s)/kG/Min (12.7 mL/Hr) IV Continuous <Continuous>  clopidogrel Tablet 75 milliGRAM(s) Oral daily  dextrose 40% Gel 15 Gram(s) Oral once  dextrose 5%. 1000 milliLiter(s) (50 mL/Hr) IV Continuous <Continuous>  dextrose 5%. 1000 milliLiter(s) (100 mL/Hr) IV Continuous <Continuous>  dextrose 50% Injectable 25 Gram(s) IV Push once  dextrose 50% Injectable 12.5 Gram(s) IV Push once  dextrose 50% Injectable 25 Gram(s) IV Push once  enoxaparin Injectable 40 milliGRAM(s) SubCutaneous daily  fentaNYL   Infusion..... 0.5 MICROgram(s)/kG/Hr (0.7 mL/Hr) IV Continuous <Continuous>  glucagon  Injectable 1 milliGRAM(s) IntraMuscular once  hydrocortisone sodium succinate Injectable 50 milliGRAM(s) IV Push every 6 hours  insulin lispro (ADMELOG) corrective regimen sliding scale   SubCutaneous every 6 hours  insulin NPH human recombinant 4 Unit(s) SubCutaneous every 6 hours  norepinephrine Infusion 0.05 MICROgram(s)/kG/Min (3.3 mL/Hr) IV Continuous <Continuous>  pantoprazole  Injectable 40 milliGRAM(s) IV Push daily  piperacillin/tazobactam IVPB.. 3.375 Gram(s) IV Intermittent every 8 hours  polyethylene glycol 3350 17 Gram(s) Oral daily  propofol Infusion 20 MICROgram(s)/kG/Min (8.44 mL/Hr) IV Continuous <Continuous>  senna 2 Tablet(s) Oral at bedtime  vancomycin  IVPB      vancomycin  IVPB 1000 milliGRAM(s) IV Intermittent every 12 hours  vasopressin Infusion 0.04 Unit(s)/Min (2.4 mL/Hr) IV Continuous <Continuous>    MEDICATIONS  (PRN):  acetaminophen   Tablet .. 650 milliGRAM(s) Oral every 4 hours PRN Temp greater or equal to 38.5C (101.3F)  sodium chloride 0.65% Nasal 1 Spray(s) Both Nostrils every 4 hours PRN Nasal Congestion      Assessment  72 y/o male w/ PAST MEDICAL & SURGICAL HISTORY:  Hypertension  CAD (coronary artery disease) - s/p stents at [facility] [place]  Diabetes mellitus, type 2  Hyperlipidemia    Admitted on 4/18/21 w/ COVID+ ARDS, and intubated 4/28    PLAN  Proned last night, and turned to supine position at 6am  Vent settings: FIO2 50%, PEEP 10, , RR 28  ABG at 11am on above settings: 7.27/51/73/21/92%  PF Ratio 146    Fentanyl and propofol for sedation  Paralysis w/ nimbex    Wean levophed and vasopressin as tolerated  500ml albumin given    Hydrocortisone 50mg IV Q6hrs (Day 1)    Cr 0.66  Urine output last 24hrs: 1,335ml  Fluid balance last 24hrs: +1,201ml  Fluid balance since admission: +417ml    LFT's within normal limits    Lactate persistently 2-3    WBC 20 (down from 26 yesterday)  Afebrile  Sputum culture 4/28 +Enterobacter, E coli  Blood culture 4/24 negative  Zosyn (Day 2)  Vanco (Day 2); Vanco trough at 5am tomorrow    AFib earlier in admission  Now in SR  Heparin, which had been for AFib, stopped  Lovenox 40 QD for DVT prophylaxis    Finger-sticks:  153 at 5am, 142 at midnight, 201 at 5pm  NPH 4u Q6hrs  Lispro sliding-scale    Glucerna OGT feeds  Protonix, miralax    Patient seen and discussed on bedside rounds with Dr. Bowers

## 2021-04-30 NOTE — PROGRESS NOTE ADULT - ASSESSMENT
Assessment  72 y/o male w/ PAST MEDICAL & SURGICAL HISTORY:  Hypertension  CAD (coronary artery disease) - s/p stents at [facility]  Diabetes mellitus, type 2  Hyperlipidemia    Admitted on 4/18/21 w/ COVID+ ARDS, and intubated 4/28    PLAN  Proned last night, and turned to supine position at 6am  Vent settings: FIO2 50%, PEEP 10, , RR 28  ABG at 11am on above settings: 7.27/51/73/21/92%  PF Ratio 146    Fentanyl and propofol for sedation  Paralysis w/ nimbex    Wean levophed and vasopressin as tolerated  500ml albumin given    Hydrocortisone 50mg IV Q6hrs (Day 1)    Cr 0.66  Urine output last 24hrs: 1,335ml  Fluid balance last 24hrs: +1,201ml  Fluid balance since admission: +417ml    LFT's within normal limits    Lactate persistently 2-3    WBC 20 (down from 26 yesterday)  Afebrile  Sputum culture 4/28 +Enterobacter, E coli  Blood culture 4/24 negative  Zosyn (Day 2)  Vanco (Day 2); Vanco trough at 5am tomorrow    AFib earlier in admission  Now in SR  Heparin, which had been for AFib, stopped  Lovenox 40 QD for DVT prophylaxis    Finger-sticks:  153 at 5am, 142 at midnight, 201 at 5pm  NPH 4u Q6hrs  Lispro sliding-scale    Glucerna OGT feeds  Protonix, miralax    Patient seen and discussed on bedside rounds with Dr. Bowers                                         Patient is a 71 year old male with PMH of HTN, HLD, DM, CAD s/p stents presnts with covid, intubated    NEURO:  -sedated with propofol 50 and fentanyl 4  -nimbex discontinued    RESP:  -acute hypoxic respiratory failure  -volume AC 28, FIO2 increased to 60, then went to 50%  -7.36/53/69/28/93.2    CARD:  CAD:  -remains on ASA and plavix  Vasoplegic hypotension:  -continue hydrocortisone for possible adrenal insufficiency  -continue on levo and vaso  -maintain MAP >65     Problem/Plan - 3:  ·  Problem: Atrial fibrillation.  Plan: Per patient, no previous hx of afib. patient was in sinus tachycardia/ NSR when he initially came to ED. Patient then converted to afib.   HR: 90s-100s.   [id number] score of 4.   GI:  -glucerna tube feeds  -bowel regimen  -protonix    :  -net negative 855cc  -BUN and creatinine remain stable    ID:  -remains on zosyn for pi  -Ecoli/enterobacter PNA,   -vanco d/c    ENDOCRINE:  steroid induced hyperglycemia  -NPH increased to 10 every 6 hours  -glucose remains elevated    VTE  -lovenox 40 sq qd

## 2021-04-30 NOTE — PROGRESS NOTE ADULT - SUBJECTIVE AND OBJECTIVE BOX
FOLLOW UP:  COVID  SUBJECTIVE/OBSERVATIONS: Patient intubated and sedated. Supine,  OVERNIGHT EVENTS:  TELE EVENTS:     Vital Signs Last 24 Hrs  T(C): 36.9 (30 Apr 2021 07:00), Max: 37.5 (29 Apr 2021 16:00)  T(F): 98.4 (30 Apr 2021 07:00), Max: 99.5 (29 Apr 2021 16:00)  HR: 70 (30 Apr 2021 09:00) (66 - 109)  BP: --  BP(mean): --  RR: 28 (30 Apr 2021 09:00) (27 - 28)  SpO2: 97% (30 Apr 2021 09:00) (94% - 98%)  I&O's Summary    29 Apr 2021 07:01  -  30 Apr 2021 07:00  --------------------------------------------------------  IN: 2414.8 mL / OUT: 1075 mL / NET: 1339.8 mL    30 Apr 2021 07:01  -  30 Apr 2021 09:09  --------------------------------------------------------  IN: 0 mL / OUT: 60 mL / NET: -60 mL        MEDICATIONS:  aspirin  chewable 81 milliGRAM(s) Oral daily  clopidogrel Tablet 75 milliGRAM(s) Oral daily  enoxaparin Injectable 40 milliGRAM(s) SubCutaneous daily  norepinephrine Infusion 0.05 MICROgram(s)/kG/Min IV Continuous <Continuous>    piperacillin/tazobactam IVPB.. 3.375 Gram(s) IV Intermittent every 8 hours  vancomycin  IVPB      vancomycin  IVPB 1000 milliGRAM(s) IV Intermittent every 12 hours      acetaminophen   Tablet .. 650 milliGRAM(s) Oral every 4 hours PRN  cisatracurium Infusion 3 MICROgram(s)/kG/Min IV Continuous <Continuous>  fentaNYL   Infusion..... 0.5 MICROgram(s)/kG/Hr IV Continuous <Continuous>  propofol Infusion 20 MICROgram(s)/kG/Min IV Continuous <Continuous>    pantoprazole  Injectable 40 milliGRAM(s) IV Push daily  polyethylene glycol 3350 17 Gram(s) Oral daily  senna 2 Tablet(s) Oral at bedtime    atorvastatin 40 milliGRAM(s) Oral at bedtime  dextrose 40% Gel 15 Gram(s) Oral once  dextrose 50% Injectable 25 Gram(s) IV Push once  dextrose 50% Injectable 12.5 Gram(s) IV Push once  dextrose 50% Injectable 25 Gram(s) IV Push once  glucagon  Injectable 1 milliGRAM(s) IntraMuscular once  hydrocortisone sodium succinate Injectable 50 milliGRAM(s) IV Push every 6 hours  insulin lispro (ADMELOG) corrective regimen sliding scale   SubCutaneous every 6 hours  insulin NPH human recombinant 4 Unit(s) SubCutaneous every 6 hours  vasopressin Infusion 0.04 Unit(s)/Min IV Continuous <Continuous>    chlorhexidine 0.12% Liquid 15 milliLiter(s) Oral Mucosa every 12 hours  chlorhexidine 4% Liquid 1 Application(s) Topical <User Schedule>  dextrose 5%. 1000 milliLiter(s) IV Continuous <Continuous>  dextrose 5%. 1000 milliLiter(s) IV Continuous <Continuous>  sodium chloride 0.65% Nasal 1 Spray(s) Both Nostrils every 4 hours PRN      REVIEW OF SYSTEMS:  Complete 10point ROS negative.    PHYSICAL EXAM:  General: NAD  Cardiovascular: Normal S1 S2, No JVD, No murmurs, No edema  Respiratory: mechanical breath sounds  Gastrointestinal:  Soft, Non-tender, + BS	  Skin: warm and dry, No rashes, No ecchymoses, No cyanosis	  Extremities: Normal range of motion, No clubbing, cyanosis or edema  Vascular: Peripheral pulses palpable 2+ bilaterally    LABS:	 	    CBC Full  -  ( 30 Apr 2021 03:07 )  WBC Count : 13.87 K/uL  Hemoglobin : 10.9 g/dL  Hematocrit : 33.2 %  Platelet Count - Automated : 100 K/uL  Mean Cell Volume : 94.6 fL  Mean Cell Hemoglobin : 31.1 pg  Mean Cell Hemoglobin Concentration : 32.8 gm/dL  Auto Neutrophil # : x  Auto Lymphocyte # : x  Auto Monocyte # : x  Auto Eosinophil # : x  Auto Basophil # : x  Auto Neutrophil % : x  Auto Lymphocyte % : x  Auto Monocyte % : x  Auto Eosinophil % : x  Auto Basophil % : x    04-30    128<L>  |  97<L>  |  35<H>  ----------------------------<  258<H>  4.7   |  26  |  0.58  04-29    131<L>  |  101  |  35<H>  ----------------------------<  153<H>  4.9   |  22  |  0.66    Ca    7.8<L>      30 Apr 2021 03:07  Ca    7.9<L>      29 Apr 2021 00:50  Phos  2.1     04-30  Phos  3.7     04-29  Mg     2.5     04-30  Mg     2.3     04-29    TPro  5.3<L>  /  Alb  2.3<L>  /  TBili  0.8  /  DBili  x   /  AST  17  /  ALT  14  /  AlkPhos  93  04-30  TPro  5.4<L>  /  Alb  2.3<L>  /  TBili  1.0  /  DBili  x   /  AST  14  /  ALT  15  /  AlkPhos  120  04-29      proBNP:   Lipid Profile:   HgA1c:   TSH:       CARDIAC MARKERS:            	   FOLLOW UP:  COVID  SUBJECTIVE/OBSERVATIONS: Patient intubated and sedated.     Vital Signs Last 24 Hrs  T(C): 36.9 (30 Apr 2021 07:00), Max: 37.5 (29 Apr 2021 16:00)  T(F): 98.4 (30 Apr 2021 07:00), Max: 99.5 (29 Apr 2021 16:00)  HR: 70 (30 Apr 2021 09:00) (66 - 109)  BP: --  BP(mean): --  RR: 28 (30 Apr 2021 09:00) (27 - 28)  SpO2: 97% (30 Apr 2021 09:00) (94% - 98%)  I&O's Summary    29 Apr 2021 07:01  -  30 Apr 2021 07:00  --------------------------------------------------------  IN: 2414.8 mL / OUT: 1075 mL / NET: 1339.8 mL    30 Apr 2021 07:01  -  30 Apr 2021 09:09  --------------------------------------------------------  IN: 0 mL / OUT: 60 mL / NET: -60 mL        MEDICATIONS:  aspirin  chewable 81 milliGRAM(s) Oral daily  clopidogrel Tablet 75 milliGRAM(s) Oral daily  enoxaparin Injectable 40 milliGRAM(s) SubCutaneous daily  norepinephrine Infusion 0.05 MICROgram(s)/kG/Min IV Continuous <Continuous>    piperacillin/tazobactam IVPB.. 3.375 Gram(s) IV Intermittent every 8 hours  vancomycin  IVPB      vancomycin  IVPB 1000 milliGRAM(s) IV Intermittent every 12 hours      acetaminophen   Tablet .. 650 milliGRAM(s) Oral every 4 hours PRN  cisatracurium Infusion 3 MICROgram(s)/kG/Min IV Continuous <Continuous>  fentaNYL   Infusion..... 0.5 MICROgram(s)/kG/Hr IV Continuous <Continuous>  propofol Infusion 20 MICROgram(s)/kG/Min IV Continuous <Continuous>    pantoprazole  Injectable 40 milliGRAM(s) IV Push daily  polyethylene glycol 3350 17 Gram(s) Oral daily  senna 2 Tablet(s) Oral at bedtime    atorvastatin 40 milliGRAM(s) Oral at bedtime  dextrose 40% Gel 15 Gram(s) Oral once  dextrose 50% Injectable 25 Gram(s) IV Push once  dextrose 50% Injectable 12.5 Gram(s) IV Push once  dextrose 50% Injectable 25 Gram(s) IV Push once  glucagon  Injectable 1 milliGRAM(s) IntraMuscular once  hydrocortisone sodium succinate Injectable 50 milliGRAM(s) IV Push every 6 hours  insulin lispro (ADMELOG) corrective regimen sliding scale   SubCutaneous every 6 hours  insulin NPH human recombinant 4 Unit(s) SubCutaneous every 6 hours  vasopressin Infusion 0.04 Unit(s)/Min IV Continuous <Continuous>    chlorhexidine 0.12% Liquid 15 milliLiter(s) Oral Mucosa every 12 hours  chlorhexidine 4% Liquid 1 Application(s) Topical <User Schedule>  dextrose 5%. 1000 milliLiter(s) IV Continuous <Continuous>  dextrose 5%. 1000 milliLiter(s) IV Continuous <Continuous>  sodium chloride 0.65% Nasal 1 Spray(s) Both Nostrils every 4 hours PRN      REVIEW OF SYSTEMS:  Complete 10point ROS negative.    PHYSICAL EXAM:  General: NAD  Cardiovascular: Normal S1 S2, No JVD, No murmurs, No edema  Respiratory: mechanical breath sounds  Gastrointestinal:  Soft, Non-tender, + BS	  Skin: warm and dry, No rashes, No ecchymoses, No cyanosis	  Extremities: Normal range of motion, No clubbing, cyanosis or edema  Vascular: Peripheral pulses palpable 2+ bilaterally    LABS:	 	    CBC Full  -  ( 30 Apr 2021 03:07 )  WBC Count : 13.87 K/uL  Hemoglobin : 10.9 g/dL  Hematocrit : 33.2 %  Platelet Count - Automated : 100 K/uL  Mean Cell Volume : 94.6 fL  Mean Cell Hemoglobin : 31.1 pg  Mean Cell Hemoglobin Concentration : 32.8 gm/dL  Auto Neutrophil # : x  Auto Lymphocyte # : x  Auto Monocyte # : x  Auto Eosinophil # : x  Auto Basophil # : x  Auto Neutrophil % : x  Auto Lymphocyte % : x  Auto Monocyte % : x  Auto Eosinophil % : x  Auto Basophil % : x    04-30    128<L>  |  97<L>  |  35<H>  ----------------------------<  258<H>  4.7   |  26  |  0.58  04-29    131<L>  |  101  |  35<H>  ----------------------------<  153<H>  4.9   |  22  |  0.66    Ca    7.8<L>      30 Apr 2021 03:07  Ca    7.9<L>      29 Apr 2021 00:50  Phos  2.1     04-30  Phos  3.7     04-29  Mg     2.5     04-30  Mg     2.3     04-29    TPro  5.3<L>  /  Alb  2.3<L>  /  TBili  0.8  /  DBili  x   /  AST  17  /  ALT  14  /  AlkPhos  93  04-30  TPro  5.4<L>  /  Alb  2.3<L>  /  TBili  1.0  /  DBili  x   /  AST  14  /  ALT  15  /  AlkPhos  120  04-29

## 2021-05-01 NOTE — PROGRESS NOTE ADULT - ASSESSMENT
Patient is a 71 year old male with PMH of HTN, HLD, DM, CAD s/p stents presnts with covid, intubated    NEURO:  -Sedated with propofol and Fent gtt's. Will wean as tolerated  -Will trial off NImbex    RESP:  -Acute hypoxic respiratory failure  -Last proned 2030 on 4/30, will supine today at 1430  -Follow up ABG when supine and off Nimbex    CV:  -C/w  on ASA and Plavix  -D/c'ed hydrocortisone  -C/w levophed. Will wean as tolerated. Vasopressin d/c'ed      GI:  -Glucerna tube feeds, pt tolerating  -bowel regimen in place  -Protonix ppx    :  -BUN and creatinine remain stable  -Strict I&O    ID:  -4/28 Ecoli/enterobacter PNA,  -C/w Zosyn   -Vanco d/c'ed    ENDOCRINE:  steroid induced hyperglycemia  -NPH 10 every 6 hours  -ISS    VTE  -lovenox 40 sq qd

## 2021-05-01 NOTE — PROGRESS NOTE ADULT - SUBJECTIVE AND OBJECTIVE BOX
Significant recent/past 24 hr events: No overnight events    Subjective:    Review of Systems       Unable to obtain due to: intubated & sedated       HPI:  71 year old Male with a PMH of HTN, HLD, CAD s/p stents [2011, on ASA and Plavix], DM type 2 presents with SOB and hypoxia. Patient has been having nonproductive cough and myalgias since April 5th. He was initially treated for allergies without any symptomatic improvement. He then developed SOB 1 week ago that has progressively worsened during the week. He was tested positive for COVID about 2 days ago. On Saturday, he was faring even worse, having difficulty breathing, so his daughter became worried and called EMS. He was found to have O2 sat of 53% on RA when EMS arrived. He was placed on NRB. Denies falls, LOC, chest pain, abdominal pain, nausea, vomiting, melena, hematochezia, dysuria or calf tenderness.     Patient any hx of afib. He states he only had stents placed in the past at [facility].     ED course: Patient had sinus tachycardia/NSR when initially arrived to ED. He then converted to afib. Patient also was hypoxemic in ED. His NRB was upgraded to HFNC. He was desatting to 89 on HFNC, so he also had NRB placed on top of HFNC. His sat improved 100% after that.    Patient received 1 dose of Pfizer vaccine in March and was supposed to get second dose this month.     PAST MEDICAL & SURGICAL HISTORY:  Hypertension    CAD (coronary artery disease)  s/p stents at [facility]    Diabetes mellitus, type 2    Hyperlipidemia    S/P coronary artery stent placement      FAMILY HISTORY:  Family history of diabetes mellitus (Father)        Vitals   ICU Vital Signs Last 24 Hrs  T(C): 36.6 (01 May 2021 12:00), Max: 37.2 (30 Apr 2021 16:00)  T(F): 97.9 (01 May 2021 12:00), Max: 99 (30 Apr 2021 16:00)  HR: 85 (01 May 2021 12:00) (55 - 85)  ABP: 119/50 (01 May 2021 12:00) (96/48 - 144/57)  ABP(mean): 72 (01 May 2021 12:00) (65 - 89)  RR: 30 (01 May 2021 12:00) (30 - 30)  SpO2: 98% (01 May 2021 12:00) (96% - 100%)      Physical Exam:   Constitutional: NAD, well-groomed, well-developed  Respiratory: vented Breath Sounds equal & clear bilaterally to auscultation  Cardiovascular: Regular rate, regular rhythm, normal S1, S2; no murmurs or rub  Gastrointestinal: Soft, non-tender, non distended, normal bowel sounds  Extremities: no peripheral edema, no cyanosis, no clubbing   Vascular: Equal and normal pulses: 2+ peripheral pulses throughout  Neurological: Sedated  Skin: warm, dry, well perfused, no rashes    VENT SETTINGS   Mode: AC/ CMV (Assist Control/ Continuous Mandatory Ventilation)  RR (machine): 26  TV (machine): 400  FiO2: 50  PEEP: 8  ITime: 0.78  MAP: 16  PIP: 32    ABG - ( 01 May 2021 06:23 )  pH, Arterial: 7.44  pH, Blood: x     /  pCO2: 45    /  pO2: 73    / HCO3: 30    / Base Excess: 6.3   /  SaO2: 95.1                I&O's Detail    30 Apr 2021 07:01  -  01 May 2021 07:00  --------------------------------------------------------  IN:    Cisatracurium: 22 mL    Cisatracurium: 67.7 mL    Enteral Tube Flush: 120 mL    FentaNYL: 34.4 mL    Glucerna: 220 mL    IV PiggyBack: 750 mL    Norepinephrine: 95.4 mL    Propofol: 494 mL    Vasopressin: 39 mL  Total IN: 1842.5 mL    OUT:    Indwelling Catheter - Urethral (mL): 1575 mL  Total OUT: 1575 mL    Total NET: 267.5 mL      01 May 2021 07:01  -  01 May 2021 13:04  --------------------------------------------------------  IN:    Cisatracurium: 85 mL    FentaNYL: 15 mL    Norepinephrine: 19.5 mL    Propofol: 95 mL    Vasopressin: 4.8 mL  Total IN: 219.3 mL    OUT:    Indwelling Catheter - Urethral (mL): 250 mL  Total OUT: 250 mL    Total NET: -30.7 mL          LABS                        10.2   11.28 )-----------( 100      ( 01 May 2021 02:07 )             30.0     05-01    128<L>  |  92<L>  |  42<H>  ----------------------------<  244<H>  4.3   |  30  |  0.58    Ca    7.7<L>      01 May 2021 02:07  Phos  2.6     05-01  Mg     2.5     05-01    TPro  5.2<L>  /  Alb  2.1<L>  /  TBili  0.6  /  DBili  x   /  AST  19  /  ALT  16  /  AlkPhos  115  05-01    LIVER FUNCTIONS - ( 01 May 2021 02:07 )  Alb: 2.1 g/dL / Pro: 5.2 g/dL / ALK PHOS: 115 U/L / ALT: 16 U/L / AST: 19 U/L / GGT: x           PT/INR - ( 30 Apr 2021 03:07 )   PT: 16.9 sec;   INR: 1.50 ratio         PTT - ( 01 May 2021 02:07 )  PTT:42.3 sec          POCT Blood Glucose.: 129 mg/dL *H* (05-01-21 @ 11:13)  POCT Blood Glucose.: 208 mg/dL *H* (05-01-21 @ 05:15)  POCT Blood Glucose.: 240 mg/dL *H* (05-01-21 @ 01:13)  POCT Blood Glucose.: 257 mg/dL *H* (04-30-21 @ 17:15)        MEDICATIONS  (STANDING):  aspirin  chewable 81 milliGRAM(s) Oral daily  atorvastatin 40 milliGRAM(s) Oral at bedtime  chlorhexidine 0.12% Liquid 15 milliLiter(s) Oral Mucosa every 12 hours  chlorhexidine 4% Liquid 1 Application(s) Topical <User Schedule>  cisatracurium Infusion 3 MICROgram(s)/kG/Min (12.7 mL/Hr) IV Continuous <Continuous>  clopidogrel Tablet 75 milliGRAM(s) Oral daily  enoxaparin Injectable 40 milliGRAM(s) SubCutaneous daily  fentaNYL   Infusion..... 0.5 MICROgram(s)/kG/Hr (0.7 mL/Hr) IV Continuous <Continuous>  glucagon  Injectable 1 milliGRAM(s) IntraMuscular once  insulin lispro (ADMELOG) corrective regimen sliding scale   SubCutaneous every 6 hours  insulin NPH human recombinant 10 Unit(s) SubCutaneous every 6 hours  norepinephrine Infusion 0.05 MICROgram(s)/kG/Min (3.3 mL/Hr) IV Continuous <Continuous>  pantoprazole  Injectable 40 milliGRAM(s) IV Push daily  piperacillin/tazobactam IVPB.. 3.375 Gram(s) IV Intermittent every 8 hours  polyethylene glycol 3350 17 Gram(s) Oral daily  propofol Infusion 20 MICROgram(s)/kG/Min (8.44 mL/Hr) IV Continuous <Continuous>  senna 2 Tablet(s) Oral at bedtime    MEDICATIONS  (PRN):  acetaminophen   Tablet .. 650 milliGRAM(s) Oral every 4 hours PRN Temp greater or equal to 38.5C (101.3F)  sodium chloride 0.65% Nasal 1 Spray(s) Both Nostrils every 4 hours PRN Nasal Congestion      Allergies:  No Known Allergies

## 2021-05-02 NOTE — PROGRESS NOTE ADULT - SUBJECTIVE AND OBJECTIVE BOX
HPI: 71 year old Male with a PMH of HTN, HLD, CAD s/p stents [2011, on ASA and Plavix], DM type 2 presents with SOB and hypoxia. Patient has been having nonproductive cough and myalgias since April 5th. COVID+ on admission on 4/17 with gradual increase in oxygen requirement and subsequently intubated on 4/28 and transferred to [facility]. Hospital course has been c/b new onset afib now on AC. S/p decadron/remdesevir prior to intubation.     INTERVAL OVERNIGHT EVENTS:  Proned at 830pm, ABG with only 12mmHg improvement. Solucortef stress dose steroids completed yesterday.     Allergies:  No Known Allergies    SUBJECTIVE:    Review of Systems: Unable to obtain as patient is intubated & sedated    OBJECTIVE:  Vitals:  ICU Vital Signs Last 24 Hrs  T(C): 36.8 (02 May 2021 12:00), Max: 37.2 (02 May 2021 00:00)  T(F): 98.2 (02 May 2021 12:00), Max: 99 (02 May 2021 00:00)  HR: 73 (02 May 2021 12:00) (70 - 88)  BP: --  BP(mean): --  ABP: 101/52 (02 May 2021 12:00) (99/48 - 131/59)  ABP(mean): 70 (02 May 2021 12:00) (60 - 83)  RR: 30 (02 May 2021 12:00) (25 - 30)  SpO2: 93% (02 May 2021 12:00) (92% - 100%)    Physical Exam:   Constitutional: NAD  HEENT: NC  Respiratory: Intubated  Cardiovascular: Regular rate  Gastrointestinal: Soft, ND  Extremities: No LE edema   Neurological: Intubated, sedated     Vent Settings:  Mode: AC/ CMV (Assist Control/ Continuous Mandatory Ventilation)  RR (machine): 26  TV (machine): 400  FiO2: 65  PEEP: 8  ITime: 0.8  MAP: 15  PIP: 31    ABG - ( 02 May 2021 03:59 )  pH, Arterial: 7.35  pH, Blood: x     /  pCO2: 61    /  pO2: 67    / HCO3: 30    / Base Excess: 7.3   /  SaO2: 91.0              I&O:  I&O's Detail    01 May 2021 07:01  -  02 May 2021 07:00  --------------------------------------------------------  IN:    Cisatracurium: 221 mL    FentaNYL: 69.4 mL    IV PiggyBack: 100 mL    Norepinephrine: 87 mL    Propofol: 380 mL    Vasopressin: 4.8 mL    Vasopressin: 14.4 mL  Total IN: 876.6 mL    OUT:    Indwelling Catheter - Urethral (mL): 2325 mL  Total OUT: 2325 mL    Total NET: -1448.4 mL      02 May 2021 07:01  -  02 May 2021 12:16  --------------------------------------------------------  IN:    Cisatracurium: 68 mL    FentaNYL: 16 mL    Norepinephrine: 20 mL    Propofol: 76 mL    Vasopressin: 4.8 mL  Total IN: 184.8 mL    OUT:    Indwelling Catheter - Urethral (mL): 235 mL  Total OUT: 235 mL    Total NET: -50.2 mL        Labs:                        11.3   9.50  )-----------( 113      ( 02 May 2021 03:59 )             33.8     05-02    132<L>  |  95<L>  |  31<H>  ----------------------------<  95  4.1   |  31  |  0.60    Ca    8.0<L>      02 May 2021 03:59  Phos  2.6     05-02  Mg     2.5     05-02    TPro  5.4<L>  /  Alb  2.1<L>  /  TBili  0.7  /  DBili  x   /  AST  18  /  ALT  16  /  AlkPhos  97  05-02    LIVER FUNCTIONS - ( 02 May 2021 03:59 )  Alb: 2.1 g/dL / Pro: 5.4 g/dL / ALK PHOS: 97 U/L / ALT: 16 U/L / AST: 18 U/L / GGT: x           PT/INR - ( 02 May 2021 03:59 )   PT: 13.4 sec;   INR: 1.18 ratio         PTT - ( 01 May 2021 02:07 )  PTT:42.3 sec        POCT Blood Glucose.: 104 mg/dL *H* (05-02-21 @ 10:47)  POCT Blood Glucose.: 93 mg/dL (05-02-21 @ 06:27)  POCT Blood Glucose.: 88 mg/dL (05-02-21 @ 01:25)  POCT Blood Glucose.: 74 mg/dL (05-01-21 @ 21:34)  POCT Blood Glucose.: 138 mg/dL *H* (05-01-21 @ 17:18)  POCT Blood Glucose.: 245 mg/dL *H* (05-01-21 @ 17:07)  POCT Blood Glucose.: 67 mg/dL *L* (05-01-21 @ 17:02)    Micro:    Culture - Blood (collected 04-28-21 @ 08:26)  Source: .Blood Blood  Preliminary Report (04-29-21 @ 09:01):    No growth to date.    Culture - Blood (collected 04-28-21 @ 08:26)  Source: .Blood Blood  Preliminary Report (04-29-21 @ 09:01):    No growth to date.    Culture - Sputum (collected 04-28-21 @ 05:07)  Source: .Sputum Sputum  Gram Stain (04-28-21 @ 13:24):    Numerous polymorphonuclear leukocytes per low power field    Few Squamous epithelial cells per low power field    Few Gram Positive Cocci in Pairs and Chains per oil power field    Few Gram Positive Cocci in Clusters per oil power field    Moderate Gram Negative Rods per oil power field    Rare Yeast like cells per oil power field  Final Report (04-30-21 @ 11:38):    Moderate Enterobacter aerogenes    Moderate Escherichia coli    Numerous Haemophilus influenzae "Susceptibilities not performed"    Normal Respiratory Christine present  Organism: Enterobacter aerogenes  Escherichia coli (04-30-21 @ 11:38)  Organism: Escherichia coli (04-30-21 @ 11:38)      -  Amikacin: S <=16      -  Amoxicillin/Clavulanic Acid: R >16/8      -  Ampicillin: R >16 These ampicillin results predict results for amoxicillin      -  Ampicillin/Sulbactam: I 16/8 Enterobacter, Citrobacter, and Serratia may develop resistance during prolonged therapy (3-4 days)      -  Aztreonam: S <=4      -  Cefazolin: R 16 Enterobacter, Citrobacter, and Serratia may develop resistance during prolonged therapy (3-4 days)      -  Cefepime: S <=2      -  Cefoxitin: S <=8      -  Ceftriaxone: S <=1 Enterobacter, Citrobacter, and Serratia may develop resistance during prolonged therapy      -  Ciprofloxacin: S <=0.25      -  Ertapenem: S <=0.5      -  Gentamicin: S <=2      -  Imipenem: S <=1      -  Levofloxacin: S <=0.5      -  Meropenem: S <=1      -  Piperacillin/Tazobactam: S <=8      -  Tobramycin: S <=2      -  Trimethoprim/Sulfamethoxazole: S <=0.5/9.5      Method Type: RAMONE  Organism: Enterobacter aerogenes (04-30-21 @ 11:38)      -  Amikacin: S <=16      -  Amoxicillin/Clavulanic Acid: R >16/8      -  Ampicillin: R >16 These ampicillin results predict results for amoxicillin      -  Ampicillin/Sulbactam: R 8/4 Enterobacter, Citrobacter, and Serratia may develop resistance during prolonged therapy (3-4 days)      -  Aztreonam: S <=4      -  Cefazolin: R >16 Enterobacter, Citrobacter, and Serratia may develop resistance during prolonged therapy (3-4 days)      -  Cefepime: S <=2      -  Cefoxitin: R >16      -  Ceftriaxone: S <=1 Enterobacter, Citrobacter, and Serratia may develop resistance during prolonged therapy      -  Ciprofloxacin: S <=0.25      -  Ertapenem: S <=0.5      -  Gentamicin: S <=2      -  Imipenem: S <=1      -  Levofloxacin: S <=0.5      -  Meropenem: S <=1      -  Piperacillin/Tazobactam: S <=8      -  Tobramycin: S <=2      -  Trimethoprim/Sulfamethoxazole: S <=0.5/9.5      Method Type: RAMONE      Medications:  MEDICATIONS  (STANDING):  aspirin  chewable 81 milliGRAM(s) Oral daily  atorvastatin 40 milliGRAM(s) Oral at bedtime  chlorhexidine 0.12% Liquid 15 milliLiter(s) Oral Mucosa every 12 hours  chlorhexidine 4% Liquid 1 Application(s) Topical <User Schedule>  cisatracurium Infusion 3 MICROgram(s)/kG/Min (12.7 mL/Hr) IV Continuous <Continuous>  clopidogrel Tablet 75 milliGRAM(s) Oral daily  enoxaparin Injectable 40 milliGRAM(s) SubCutaneous daily  fentaNYL   Infusion..... 0.5 MICROgram(s)/kG/Hr (0.7 mL/Hr) IV Continuous <Continuous>  glucagon  Injectable 1 milliGRAM(s) IntraMuscular once  insulin lispro (ADMELOG) corrective regimen sliding scale   SubCutaneous every 6 hours  norepinephrine Infusion 0.05 MICROgram(s)/kG/Min (3.3 mL/Hr) IV Continuous <Continuous>  pantoprazole  Injectable 40 milliGRAM(s) IV Push daily  piperacillin/tazobactam IVPB.. 3.375 Gram(s) IV Intermittent every 8 hours  polyethylene glycol 3350 17 Gram(s) Oral daily  propofol Infusion 20 MICROgram(s)/kG/Min (8.44 mL/Hr) IV Continuous <Continuous>  senna 2 Tablet(s) Oral at bedtime    MEDICATIONS  (PRN):  acetaminophen   Tablet .. 650 milliGRAM(s) Oral every 4 hours PRN Temp greater or equal to 38.5C (101.3F)  sodium chloride 0.65% Nasal 1 Spray(s) Both Nostrils every 4 hours PRN Nasal Congestion

## 2021-05-02 NOTE — PROGRESS NOTE ADULT - ASSESSMENT
71 year old Male with a PMH of HTN, HLD, CAD s/p stents [2011, on ASA and Plavix], DM type 2 presents with SOB and hypoxia. Patient has been having nonproductive cough and myalgias since April 5th. COVID+ on admission on 4/17 with gradual increase in oxygen requirement and subsequently intubated on 4/28 and transferred to [facility]. Hospital course has been c/b new onset afib now on AC. S/p decadron/remdesevir prior to intubation.     NEURO:  -Sedated with propofol and Fent gtt's.  -Paralyzed on nimbex due to proning     RESP:  -Acute hypoxic respiratory failure  -AC/VC 26/400/8/65%  -Supinate 1400    CV:  Vasoplegic vs septic shock  -C/w on ASA and Plavix  -C/w levophed. Will wean as tolerated. Vasopressin d/c'ed    GI:  -Glucerna tube feeds, pt tolerating  -bowel regimen in place  -Protonix ppx    :  -BUN and creatinine remain stable  -Strict I&O    ID:  -4/28 Ecoli/enterobacter on SCx  -C/w Zosyn 4/28.     ENDOCRINE:  steroid induced hyperglycemia  -Off of stress dose steroids now  -HDSS, FSG q6h    VTE  -lovenox 40 sq qd since on DAPT     Dispo:  Full code

## 2021-05-03 NOTE — PROGRESS NOTE ADULT - ASSESSMENT
71 year old Male with a PMH of HTN, HLD, CAD s/p stents [2011, on ASA and Plavix], DM type 2 presents with SOB and hypoxia. Patient has been having nonproductive cough and myalgias since April 5th. COVID+ on admission on 4/17 with gradual increase in oxygen requirement and subsequently intubated on 4/28 and transferred to [facility]. Hospital course has been c/b new onset afib now on AC. S/p decadron/remdesevir prior to intubation.     NEURO:  -Sedated with propofol and Fent gtt's.  -Paralyzed on nimbex due to proning     RESP:  -Acute hypoxic respiratory failure  -AC/VC 26/400/8/65%  -Supinate 1600  -PaO2s not increasing properly during this proning session     CV:  Vasoplegic vs septic shock  -C/w on ASA and Plavix for CAD with stents  -Switched from levo to stephany due to afib  Afib with RVR   -S/p amio load 5/2 with chemical conversion  -Cont PO amio 200mg BID     GI:  -Glucerna tube feeds, pt tolerating  -bowel regimen in place  -Protonix ppx    :  -BUN and creatinine remain stable  -Strict I&O    ID:  -4/28 Ecoli/enterobacter on SCx  -C/w Zosyn 4/28  -BCx from 4/28 speciated gram variable rods, will rpt BCx today     ENDOCRINE:  steroid induced hyperglycemia  -Off of stress dose steroids now  -HDSS, FSG q6h    HEME:  -lovenox 70/70 full ac dose due to afib  -also on asa/plavix     Dispo:  Full code

## 2021-05-03 NOTE — PROGRESS NOTE ADULT - SUBJECTIVE AND OBJECTIVE BOX
HPI: 71 year old Male with a PMH of HTN, HLD, CAD s/p stents [2011, on ASA and Plavix], DM type 2 presents with SOB and hypoxia. Patient has been having nonproductive cough and myalgias since April 5th. COVID+ on admission on 4/17 with gradual increase in oxygen requirement and subsequently intubated on 4/28 and transferred to [facility]. S/p decadron/remdesevir prior to intubation. Hospital course has been c/b new onset afib x 2 now on full AC and DAPT, chemically converted with amio load/maintenance oral on 5/2.     INTERVAL OVERNIGHT EVENTS:  Proned at 10pm. ABGs with resp acidosis, RR inc to 30 in AM, pending rpt ABG. AF RVR with hypotension yesterday, given amio bolus/load with conversion back to NSR. Inc lovenox to 70mg BID for full AC dosing given second occurence of afib rvr. Titrating off of levo due to tachycardia and starting stephany.     Medications  acetaminophen   Tablet .. 650 milliGRAM(s) Oral every 4 hours PRN  aMIOdarone    Tablet 200 milliGRAM(s) Oral two times a day  aMIOdarone Infusion 1 mG/Min IV Continuous <Continuous>  aMIOdarone Infusion 0.5 mG/Min IV Continuous <Continuous>  aspirin  chewable 81 milliGRAM(s) Oral daily  atorvastatin 40 milliGRAM(s) Oral at bedtime  chlorhexidine 0.12% Liquid 15 milliLiter(s) Oral Mucosa every 12 hours  chlorhexidine 4% Liquid 1 Application(s) Topical <User Schedule>  cisatracurium Infusion 3 MICROgram(s)/kG/Min IV Continuous <Continuous>  clopidogrel Tablet 75 milliGRAM(s) Oral daily  enoxaparin Injectable 70 milliGRAM(s) SubCutaneous every 12 hours  fentaNYL   Infusion..... 0.5 MICROgram(s)/kG/Hr IV Continuous <Continuous>  glucagon  Injectable 1 milliGRAM(s) IntraMuscular once  insulin lispro (ADMELOG) corrective regimen sliding scale   SubCutaneous every 6 hours  norepinephrine Infusion 0.05 MICROgram(s)/kG/Min IV Continuous <Continuous>  pantoprazole  Injectable 40 milliGRAM(s) IV Push daily  phenylephrine    Infusion 1 MICROgram(s)/kG/Min IV Continuous <Continuous>  piperacillin/tazobactam IVPB.. 3.375 Gram(s) IV Intermittent every 8 hours  polyethylene glycol 3350 17 Gram(s) Oral daily  propofol Infusion 20 MICROgram(s)/kG/Min IV Continuous <Continuous>  senna 2 Tablet(s) Oral at bedtime  sodium chloride 0.65% Nasal 1 Spray(s) Both Nostrils every 4 hours PRN      ICU Vitals  T(C): 36.6 (05-03-21 @ 10:00), Max: 37.6 (05-02-21 @ 20:00)  HR: 75 (05-03-21 @ 11:20) (72 - 117)  BP: --  RR: 30 (05-03-21 @ 10:00) (25 - 30)  SpO2: 96% (05-03-21 @ 11:20) (92% - 99%)    Capillary blood glucose  POCT Blood Glucose.: 147 mg/dL (05-03-21 @ 11:20)  POCT Blood Glucose.: 167 mg/dL (05-03-21 @ 05:47)  POCT Blood Glucose.: 126 mg/dL (05-02-21 @ 23:44)  POCT Blood Glucose.: 88 mg/dL (05-02-21 @ 16:19)    Physical Exam:  Physical Exam:   Constitutional: NAD  HEENT: NC  Respiratory: Intubated  Cardiovascular: Regular rate  Gastrointestinal: Soft, ND  Extremities: No LE edema   Neurological: Intubated, sedated     I&O    05-02-21 @ 07:01  -  05-03-21 @ 07:00  --------------------------------------------------------  IN: 1571.8 mL / OUT: 1780 mL / NET: -208.2 mL    05-03-21 @ 07:01  -  05-03-21 @ 11:39  --------------------------------------------------------  IN: 318.5 mL / OUT: 200 mL / NET: 118.5 mL        Labs  CBC (05-03 @ 00:21)                          11.9<L>                   13.30<H>  )--------------(  118<L>     83.2<H>% Neuts, 3.5<L>% Lymphs, ANC: 12.13<H>                          36.7<L>  CBC (05-02 @ 03:59)                          11.3<L>                   9.50    )--------------(  113<L>     --    % Neuts, --    % Lymphs, ANC: --                              33.8<L>    BMP (05-03 @ 00:21)       131<L>  |  95<L>   |  28<H> 			Ca++ --      Ca 8.3<L>       ---------------------------------( 133<H>		Mg 2.5          4.6     |  27      |  0.56  			Ph 4.4     BMP (05-02 @ 03:59)       132<L>  |  95<L>   |  31<H> 			Ca++ --      Ca 8.0<L>       ---------------------------------( 95    		Mg 2.5          4.1     |  31      |  0.60  			Ph 2.6       LFTs (05-03 @ 00:21)      TPro 5.6<L> / Alb 1.6<L> / TBili 1.0 / DBili -- / AST 31 / ALT 19 / AlkPhos 150<H>  LFTs (05-02 @ 03:59)      TPro 5.4<L> / Alb 2.1<L> / TBili 0.7 / DBili -- / AST 18 / ALT 16 / AlkPhos 97    Coags (05-03 @ 00:21)  aPTT 47.5<H> / INR 1.42<H> / PT 15.9<H>  Coags (05-02 @ 03:59)  aPTT -- / INR 1.18<H> / PT 13.4      ABG (05-02 @ 23:18)     7.28<L> / 68<H> / 63<L> / 27<H> / 4.7<H> / 86.8<L>%     Lactate:    ABG (05-02 @ 19:44)     7.32<L> / 64<H> / 59<L> / 28<H> / 5.8<H> / 86.4<L>%     Lactate:          -> .Blood Blood Culture (04-28 @ 08:26)       Growth in anaerobic bottle: Gram Variable Rods    NG    Growth in anaerobic bottle: Gram Variable Rods  **BCID performed. No targets detected.**  ***Blood Panel PCR results on this specimen are available  approximately 3 hours after the Gram stain result.***  Gram stain, PCR, and/or culture results may not always  correspond due to difference in methodologies.  ************************************************************  This PCR assay was performed by multiplex PCR. This  Assay tests for 66 bacterial and resistance gene targets.  Please refer to the [facility] test directory  at [email or web address] for details.    -> .Blood Blood Culture (04-28 @ 05:07)       Numerous polymorphonuclear leukocytes per low power field  Few Squamous epithelial cells per low power field  Few Gram Positive Cocci in Pairs and Chains per oil power field  Few Gram Positive Cocci in Clusters per oil power field  Moderate Gram Negative Rods per oil power field  Rare Yeast like cells per oil power field    Enterobacter aerogenes  Escherichia coli    No Growth Final    -> .Blood Blood Culture (04-24 @ 16:09)     NG    NG    No Growth Final

## 2021-05-04 NOTE — PROGRESS NOTE ADULT - SUBJECTIVE AND OBJECTIVE BOX
HPI: 71 year old Male with a PMH of HTN, HLD, CAD s/p stents [2011, on ASA and Plavix], DM type 2 presents with SOB and hypoxia. Patient has been having nonproductive cough and myalgias since April 5th. COVID+ on admission on 4/17 with gradual increase in oxygen requirement and subsequently intubated on 4/28 and transferred to [facility] [facility]. S/p decadron/remdesevir prior to intubation. Hospital course has been c/b new onset afib x 2 now on full AC and DAPT, chemically converted with amio load/maintenance oral on 5/2.     INTERVAL OVERNIGHT EVENTS:  Proned at 11pm. PaO2 improved marginally from 70-86, and FiO2 decreased from 80-70%. HypoNa to 127, given albumin 5% 250cc bolus and started on NaCl tabs.     Medications  acetaminophen   Tablet .. 650 milliGRAM(s) Oral every 4 hours PRN  albumin human 25% IVPB 50 milliLiter(s) IV Intermittent every 12 hours  aMIOdarone Infusion 0.5 mG/Min IV Continuous <Continuous>  ascorbic acid 500 milliGRAM(s) Oral daily  aspirin  chewable 81 milliGRAM(s) Oral daily  atorvastatin 40 milliGRAM(s) Oral at bedtime  chlorhexidine 0.12% Liquid 15 milliLiter(s) Oral Mucosa every 12 hours  chlorhexidine 4% Liquid 1 Application(s) Topical <User Schedule>  cisatracurium Infusion 3 MICROgram(s)/kG/Min IV Continuous <Continuous>  clopidogrel Tablet 75 milliGRAM(s) Oral daily  enoxaparin Injectable 70 milliGRAM(s) SubCutaneous every 12 hours  fentaNYL   Infusion..... 0.5 MICROgram(s)/kG/Hr IV Continuous <Continuous>  glucagon  Injectable 1 milliGRAM(s) IntraMuscular once  insulin lispro (ADMELOG) corrective regimen sliding scale   SubCutaneous every 6 hours  pantoprazole  Injectable 40 milliGRAM(s) IV Push daily  phenylephrine    Infusion 1 MICROgram(s)/kG/Min IV Continuous <Continuous>  piperacillin/tazobactam IVPB.. 3.375 Gram(s) IV Intermittent every 8 hours  polyethylene glycol 3350 17 Gram(s) Oral daily  propofol Infusion 20 MICROgram(s)/kG/Min IV Continuous <Continuous>  senna 2 Tablet(s) Oral at bedtime  sodium chloride 1 Gram(s) Oral every 6 hours  sodium chloride 0.65% Nasal 1 Spray(s) Both Nostrils every 4 hours PRN  vasopressin Infusion 0.04 Unit(s)/Min IV Continuous <Continuous>      ICU Vitals  T(C): 36.9 (05-04-21 @ 08:00), Max: 37.6 (05-03-21 @ 20:00)  HR: 70 (05-04-21 @ 11:12) (64 - 113)  BP: --  RR: 34 (05-04-21 @ 11:00) (11 - 34)  SpO2: 92% (05-04-21 @ 11:12) (91% - 99%)    Capillary blood glucose  POCT Blood Glucose.: 144 mg/dL (05-04-21 @ 10:57)  POCT Blood Glucose.: 129 mg/dL (05-04-21 @ 05:11)  POCT Blood Glucose.: 99 mg/dL (05-03-21 @ 21:54)  POCT Blood Glucose.: 104 mg/dL (05-03-21 @ 17:07)    Physical Exam:  Constitutional: NAD  HEENT: NC  Respiratory: Intubated  Cardiovascular: Regular rate  Gastrointestinal: Soft, ND  Extremities: No LE edema   Neurological: Intubated, sedated     I&O    05-03-21 @ 07:01  -  05-04-21 @ 07:00  --------------------------------------------------------  IN: 2175.3 mL / OUT: 1445 mL / NET: 730.3 mL    05-04-21 @ 07:01  -  05-04-21 @ 11:36  --------------------------------------------------------  IN: 359 mL / OUT: 255 mL / NET: 104 mL        Labs  CBC (05-04 @ 01:11)                          11.1<L>                   12.94<H>  )--------------(  123<L>     92.2<H>% Neuts, 1.7<L>% Lymphs, ANC: 12.27<H>                          34.1<L>  CBC (05-03 @ 00:21)                          11.9<L>                   13.30<H>  )--------------(  118<L>     83.2<H>% Neuts, 3.5<L>% Lymphs, ANC: 12.13<H>                          36.7<L>    BMP (05-04 @ 01:11)       127<L>  |  93<L>   |  37<H> 			Ca++ --      Ca 7.8<L>       ---------------------------------( 111<H>		Mg 2.5          4.5     |  27      |  0.72  			Ph 3.8     BMP (05-03 @ 00:21)       131<L>  |  95<L>   |  28<H> 			Ca++ --      Ca 8.3<L>       ---------------------------------( 133<H>		Mg 2.5          4.6     |  27      |  0.56  			Ph 4.4       LFTs (05-04 @ 01:11)      TPro 5.5<L> / Alb 1.3<L> / TBili 0.8 / DBili -- / AST 22 / ALT 15 / AlkPhos 157<H>  LFTs (05-03 @ 00:21)      TPro 5.6<L> / Alb 1.6<L> / TBili 1.0 / DBili -- / AST 31 / ALT 19 / AlkPhos 150<H>    Coags (05-04 @ 01:11)  aPTT 58.1<H> / INR 1.40<H> / PT 15.8<H>  Coags (05-03 @ 00:21)  aPTT 47.5<H> / INR 1.42<H> / PT 15.9<H>      ABG (05-04 @ 04:29)     7.28<L> / 68<H> / 86 / 27<H> / 4.4<H> / 94.9<L>%     Lactate:    ABG (05-04 @ 01:11)     7.30<L> / 63<H> / 70<L> / 27<H> / 3.9<H> / 91.6<L>%     Lactate:          -> .Blood Blood Culture (04-28 @ 05:07)       Growth in anaerobic bottle: Gram Variable Rods    Enterobacter aerogenes  Escherichia coli    Growth in anaerobic bottle: Propionibacterium acnes "Susceptibilities not  performed"  **BCID performed. No targets detected.**  ***Blood Panel PCR results on this specimen are available  approximately 3 hours after the Gram stain result.***  Gram stain, PCR, and/or culture results may not always  correspond due to difference in methodologies.  ************************************************************  This PCR assay was performed by multiplex PCR. This  Assay tests for 66 bacterial and resistance gene targets.  Please refer to the [facility] [facility] test directory  at [email or web address] for details.    -> .Blood Blood Culture (04-24 @ 16:09)     NG    NG    No Growth Final

## 2021-05-04 NOTE — PROGRESS NOTE ADULT - ASSESSMENT
71 year old Male with a PMH of HTN, HLD, CAD s/p stents [2011, on ASA and Plavix], DM type 2 presents with SOB and hypoxia. Patient has been having nonproductive cough and myalgias since April 5th. COVID+ on admission on 4/17 with gradual increase in oxygen requirement and subsequently intubated on 4/28 and transferred to [facility] [facility] S/p decadron/remdesevir prior to intubation. Hospital course has been c/b new onset afib x 2 now on full AC and DAPT, chemically converted with amio load/maintenance oral on 5/2.     NEURO:  -Sedated with propofol and Fent gtt's.  -Paralyzed on nimbex due to proning     RESP:  -Acute hypoxic respiratory failure  -AC/VC 34/400/8/70%  -Supinate 1700    CV:  Vasoplegic vs septic shock  -C/w on ASA and Plavix for CAD with stents  -Cont stephany/vaso, avoiding levo due to afib rvr   Afib with RVR   -S/p amio load 5/2 with chemical conversion  -Went back to AFRVR this AM, restarted amio gtt 0.5/hr and dc po amio     GI:  -Glucerna tube feeds, pt tolerating  -bowel regimen in place  -Protonix ppx    :  HypoNa  -NaCl tabs 1g q6h  -Albumin 25% 25g BID for hypoalbuminemia   -BUN and creatinine remain stable  -Strict I&O    ID:  -4/28 Ecoli/enterobacter on SCx  -C/w Zosyn 4/28  -BCx from 4/28 speciated gram variable rods,  -Bcx from 5/2 in process     ENDOCRINE:  steroid induced hyperglycemia  -Off of stress dose steroids now  -HDSS, FSG q6h    HEME:  -lovenox 70/70 full ac dose due to afib  -also on asa/plavix     Dispo:  Full code

## 2021-05-05 NOTE — PROGRESS NOTE ADULT - SUBJECTIVE AND OBJECTIVE BOX
SCARLETT MONTOYA  MRN-[id number]  Patient is a 71y old  Male who presents with a chief complaint of SOB and hypoxia, COVID (04 May 2021 11:27)    HPI:  72 y/o M with hx of HTN, HLD, CAD s/p stents [2011, on ASA and Plavix], DM type 2 presents with SOB and hypoxia. Patient has been having nonproductive cough and myalgias since April 5th. He was initially treated for allergies without any symptomatic improvement. He then developed SOB 1 week ago that has progressively worsened during the week. He was tested positive for COVID about 2 days ago. On Saturday, he was faring even worse, having difficulty breathing, so his daughter became worried and called EMS. He was found to have O2 sat of 53% on RA when EMS arrived. He was placed on NRB. Denies falls, LOC, chest pain, abdominal pain, nausea, vomiting, melena, hematochezia, dysuria or calf tenderness.     Patient any hx of afib. He states he only had stents placed in the past at [facility].     ED course: Patient had sinus tachycardia/NSR when initially arrived to ED. He then converted to afib. Patient also was hypoxemic in ED. His NRB was upgraded to HFNC. He was desatting to 89 on HFNC, so he also had NRB placed on top of HFNC. His sat improved 100% after that.    Patient received 1 dose of Pfizer vaccine in March and was supposed to get second dose this month.  (18 Apr 2021 01:09)      24 HOUR EVENTS:    REVIEW OF SYSTEMS:    CONSTITUTIONAL: No weakness, fevers or chills  EYES/ENT: No visual changes;  No vertigo or throat pain   NECK: No pain or stiffness  RESPIRATORY: No cough, wheezing, hemoptysis; No shortness of breath  CARDIOVASCULAR: No chest pain or palpitations  GASTROINTESTINAL: No abdominal or epigastric pain. No nausea, vomiting, or hematemesis; No diarrhea or constipation. No melena or hematochezia.  GENITOURINARY: No dysuria, frequency or hematuria  NEUROLOGICAL: No numbness or weakness  SKIN: No itching, rashes      ICU Vital Signs Last 24 Hrs  T(C): 37.4 (05 May 2021 06:00), Max: 37.4 (05 May 2021 06:00)  T(F): 99.3 (05 May 2021 06:00), Max: 99.3 (05 May 2021 06:00)  HR: 72 (05 May 2021 06:00) (67 - 111)  BP: --  BP(mean): --  ABP: 109/56 (05 May 2021 06:00) (82/52 - 136/68)  ABP(mean): 75 (05 May 2021 06:00) (61 - 93)  RR: 34 (04 May 2021 19:00) (24 - 34)  SpO2: 88% (05 May 2021 06:00) (88% - 99%)    Mode: AC/ CMV (Assist Control/ Continuous Mandatory Ventilation), RR (machine): 34, TV (machine): 400, FiO2: 70, PEEP: 8, ITime: 0.68  CVP(mm Hg): --  CO: --  CI: --  PA: --  PA(mean): --  PA(direct): --  PCWP: --  LA: --  RA: --  SVR: --  SVRI: --  PVR: --  PVRI: --  I&O's Summary    04 May 2021 07:01  -  05 May 2021 07:00  --------------------------------------------------------  IN: 3439.1 mL / OUT: 1020 mL / NET: 2419.1 mL        CAPILLARY BLOOD GLUCOSE    CAPILLARY BLOOD GLUCOSE      POCT Blood Glucose.: 169 mg/dL (05 May 2021 05:34)      PHYSICAL EXAM:  GENERAL: No acute distress, well-developed  HEAD:  Atraumatic, Normocephalic  EYES: EOMI, PERRLA, conjunctiva and sclera clear  NECK: Supple, no lymphadenopathy, no JVD  CHEST/LUNG: CTAB; No wheezes, rales, or rhonchi  HEART: Regular rate and rhythm. Normal S1/S2. No murmurs, rubs, or gallops  ABDOMEN: Soft, non-tender, non-distended; normal bowel sounds, no organomegaly  EXTREMITIES:  2+ peripheral pulses b/l, No clubbing, cyanosis, or edema  NEUROLOGY: A&O x 3, no focal deficits  SKIN: No rashes or lesions    ============================I/O===========================   I&O's Detail    04 May 2021 07:01  -  05 May 2021 07:00  --------------------------------------------------------  IN:    Amiodarone: 317.4 mL    Cisatracurium: 405.6 mL    Enteral Tube Flush: 100 mL    FentaNYL: 121.8 mL    Glucerna: 360 mL    Phenylephrine: 1858 mL    Propofol: 218.7 mL    Vasopressin: 57.6 mL  Total IN: 3439.1 mL    OUT:    Indwelling Catheter - Urethral (mL): 1020 mL  Total OUT: 1020 mL    Total NET: 2419.1 mL        ============================ LABS =========================                        9.7    8.51  )-----------( 114      ( 05 May 2021 04:04 )             30.9     05-05    129<L>  |  95<L>  |  50<H>  ----------------------------<  180<H>  4.8   |  23  |  0.73    Ca    7.5<L>      05 May 2021 04:04  Phos  3.0     05-05  Mg     2.7     05-05    TPro  5.4<L>  /  Alb  1.6<L>  /  TBili  1.6<H>  /  DBili  x   /  AST  2308<H>  /  ALT  783<H>  /  AlkPhos  148<H>  05-05                LIVER FUNCTIONS - ( 05 May 2021 04:04 )  Alb: 1.6 g/dL / Pro: 5.4 g/dL / ALK PHOS: 148 U/L / ALT: 783 U/L / AST: 2308 U/L / GGT: x           PT/INR - ( 05 May 2021 04:04 )   PT: 20.3 sec;   INR: 1.82 ratio         PTT - ( 04 May 2021 01:11 )  PTT:58.1 sec  ABG - ( 05 May 2021 04:04 )  pH, Arterial: 7.33  pH, Blood: x     /  pCO2: 53    /  pO2: 69    / HCO3: 25    / Base Excess: 1.8   /  SaO2: 91.8              Blood Gas Arterial, Lactate: 4.2 mmol/L (05-05-21 @ 04:04)  Blood Gas Arterial, Lactate: 2.8 mmol/L (05-04-21 @ 18:53)  Blood Gas Arterial, Lactate: 2.6 mmol/L (05-04-21 @ 12:55)  Blood Gas Arterial, Lactate: 2.2 mmol/L (05-04-21 @ 04:29)  Blood Gas Arterial, Lactate: 2.0 mmol/L (05-04-21 @ 01:11)  Blood Gas Arterial, Lactate: 2.8 mmol/L (05-03-21 @ 19:39)  Blood Gas Arterial, Lactate: 2.1 mmol/L (05-03-21 @ 12:07)  Blood Gas Arterial, Lactate: 2.5 mmol/L (05-02-21 @ 23:18)      ======================Micro/Rad/Cardio=================  Telemetry: Reviewed   EKG: Reviewed  CXR: Reviewed  Culture: Reviewed   Echo: Transthoracic Echocardiogram:   Patient name: SCARLETT MONTOYA  YOB: 1950   Age: 71 (M)   MR#: [id number]  Study Date: 4/19/2021  [facility] Jerri Iqbal RDCS  Study quality: Technically good  Referring Physician: Miranda Montoya MD  Blood Pressure: 125/72 mmHg  Height: 172 cm  Weight: 70 kg  BSA: 1.8 m2  ------------------------------------------------------------------------  PROCEDURE: Transthoracic echocardiogram with 2-D, M-Mode  and complete spectral and color flow Doppler.  INDICATION: Unspecified atrial fibrillation (I48.91)  ------------------------------------------------------------------------  DIMENSIONS:  Dimensions:     Normal Values:  LA:     3.1 cm    2.0 - 4.0 cm  Ao:     3.1 cm    2.0 - 3.8 cm  SEPTUM: 0.8 cm    0.6 - 1.2 cm  PWT:    1.0 cm    0.6 - 1.1 cm  LVIDd:  4.7 cm    3.0 - 5.6 cm  LVIDs:  3.3 cm    1.8 - 4.0 cm  Derived Variables:  LVMI: 78 g/m2  RWT: 0.42  Fractional short: 30 %  Ejection Fraction (Teicholtz): 57 %  ------------------------------------------------------------------------  OBSERVATIONS:  Mitral Valve: Mitral annular calcification, otherwise  normal mitral valve. Minimal mitral regurgitation.  Aortic Root: Normal aortic root.  Aortic Valve: Calcified trileaflet aortic valve with normal  opening. Minimal aortic regurgitation.  Left Atrium: Normal left atrium.  LA volume index = 23  cc/m2.  Left Ventricle: Normal left ventricular systolic function.  No segmental wall motion abnormalities. Normal left  ventricular internal dimensions and wall thicknesses.  Right Heart: Normal right atrium. Normal right ventricular  size and function. Normal tricuspid valve. Normal pulmonic  valve. Mild pulmonic regurgitation.  Pericardium/PleuraNormal pericardium with no pericardial  effusion.  ------------------------------------------------------------------------  CONCLUSIONS:  1. Mitral annular calcification, otherwise normal mitral  valve. Minimal mitral regurgitation.  2. Calcified trileaflet aortic valve with normal opening.  Minimal aorticregurgitation.  3. Normal left ventricular internal dimensions and wall  thicknesses.  4. Normal left ventricular systolic function. No segmental  wall motion abnormalities.  5. Normal right ventricular size and function.  ------------------------------------------------------------------------  Confirmed on  4/19/2021 - 17:35:50 by Rama Mello M.D. RPVI  ------------------------------------------------------------------------ (04-19-21 @ 16:23)    Cath:   ======================================================  PAST MEDICAL & SURGICAL HISTORY:  Hypertension    CAD (coronary artery disease)  s/p stents at [facility]    Diabetes mellitus, type 2    Hyperlipidemia    S/P coronary artery stent placement      ====================ASSESSMENT AND PLAN==============      ====================CARDIOVASCULAR==================    Mechanical Circulatory Support:  [ ] IABP   [ ] Impella 2.5   [ ] Impella CP  Settings:  Augmented Diastolic Pressure:  Impella Flow:     ==Hemodynamics==     (Date) CVP:      PCWP:        PA S/D:            Cardiac Output:             Cardiac Index:            SVR:    Preload (fluids, diuretics):  Afterload (anti-hypertensives, pressors):  Inotropes:    ==Pump==    TTE Date:  LVEF:                              Regional Wall Motion Abnormailities?:  [ ]Yes   [ ] No   If Yes, Details  Diastolic function:  RV function:   Any change from prior?: [ ] Yes   [ ] No   If Yes, Details:   Volume status:   [ ] Hypovolemia    [ ] Euvolemic    [ ] Hypervolemic    ==Coronaries==    Last ischemic workup:   Antiplatelet regimen:   Anticoagulant:   Statin:   Beta blocker:    ==Rhythm==    Current rhythm:  AM EKG Interpretation:   Anti-arrhythmic therapies:   TVP with settings:     aMIOdarone Infusion 0.5 mG/Min (16.7 mL/Hr) IV Continuous <Continuous>  phenylephrine    Infusion 1 MICROgram(s)/kG/Min (13.2 mL/Hr) IV Continuous <Continuous>      ====================== NEUROLOGY=====================  Sedation [ ]Yes   [ ] No   If Yes, Medication/Dose:   CAM ICU [ ] Positive  [ ] Negative    acetaminophen   Tablet .. 650 milliGRAM(s) Oral every 4 hours PRN Temp greater or equal to 38.5C (101.3F)  cisatracurium Infusion 3 MICROgram(s)/kG/Min (12.7 mL/Hr) IV Continuous <Continuous>  fentaNYL   Infusion..... 0.5 MICROgram(s)/kG/Hr (0.7 mL/Hr) IV Continuous <Continuous>  propofol Infusion 20 MICROgram(s)/kG/Min (8.44 mL/Hr) IV Continuous <Continuous>    ==================== RESPIRATORY======================  [ ] Invasive Mechanical Ventilation   [ ] BIPAP    [ ] HFNC    [ ] NC   Non-invasive Mechanical Ventilation/ HFNC Settings:   Mode: AC/ CMV (Assist Control/ Continuous Mandatory Ventilation)  RR (machine): 34  TV (machine): 400  FiO2: 70  PEEP: 8  ITime: 0.68  MAP: 17  PIP: 36    ABG - ( 05 May 2021 04:04 )  pH, Arterial: 7.33  pH, Blood: x     /  pCO2: 53    /  pO2: 69    / HCO3: 25    / Base Excess: 1.8   /  SaO2: 91.8                      ===================== RENAL =========================    05-04-21 @ 07:01  -  05-05-21 @ 07:00  --------------------------------------------------------  IN: 3439.1 mL / OUT: 1020 mL / NET: 2419.1 mL      Renal Replacement Therapy:  [ ] CRRT      [ ] IHD   Last Session:    Fluid removal:     [ ] Diuretic therapy, Regimen:       ==================== GASTROINTESTINAL===================    Diet:  Last BM:   Indication for Stress Ulcer Prophylaxis, [ ] Yes    [ ] No   If Yes, Medication:     albumin human 25% IVPB 50 milliLiter(s) IV Intermittent every 12 hours  ascorbic acid 500 milliGRAM(s) Oral daily  pantoprazole  Injectable 40 milliGRAM(s) IV Push daily  polyethylene glycol 3350 17 Gram(s) Oral daily  senna 2 Tablet(s) Oral at bedtime  sodium chloride 1 Gram(s) Oral every 6 hours    ========================INFECTIOUS DISEASE================  T(C): 37.4 (05-05-21 @ 06:00), Max: 37.4 (05-05-21 @ 06:00)  WBC Count: 8.51 K/uL (05-05-21 @ 04:04)  WBC Count: 12.94 K/uL (05-04-21 @ 01:11)  WBC Count: 13.30 K/uL (05-03-21 @ 00:21)      Culture - Blood (collected 05-03-21 @ 12:28)  Source: .Blood Blood  Preliminary Report (05-04-21 @ 13:01):    No growth to date.        Current Antibiotics/Antifungals/ Antivirals with start date:       ===================HEMATOLOGIC/ONC ===================  Hemoglobin: 9.7 g/dL (05-05-21 @ 04:04)  Hemoglobin: 11.1 g/dL (05-04-21 @ 01:11)  Hemoglobin: 11.9 g/dL (05-03-21 @ 00:21)    Platelet Count - Automated: 114 K/uL (05-05-21 @ 04:04)  Platelet Count - Automated: 123 K/uL (05-04-21 @ 01:11)  Platelet Count - Automated: 118 K/uL (05-03-21 @ 00:21)    Chemical VTE Prophylaxis:  [ ] Lovenox    [ ] SQH   [ ]NA  Systemic Anticogaulation:  [ ] Yes    [ ] No,  If Yes, Medication and Indication:     aspirin  chewable 81 milliGRAM(s) Oral daily  clopidogrel Tablet 75 milliGRAM(s) Oral daily  enoxaparin Injectable 70 milliGRAM(s) SubCutaneous every 12 hours    =======================    ENDOCRINE  =====================  Insulin drip  [ ] Yes    [ ] No  Basal Insulin [ ] Yes    [ ] No, Details:   Bolus insulin [ ] Yes    [ ] No  Sliding Scale  [ ] Yes    [ ] No    POCT Blood Glucose.: 169 mg/dL (05-05-21 @ 05:34)  POCT Blood Glucose.: 200 mg/dL (05-04-21 @ 22:47)  POCT Blood Glucose.: 159 mg/dL (05-04-21 @ 17:02)  POCT Blood Glucose.: 144 mg/dL (05-04-21 @ 10:57)        atorvastatin 40 milliGRAM(s) Oral at bedtime  glucagon  Injectable 1 milliGRAM(s) IntraMuscular once  insulin lispro (ADMELOG) corrective regimen sliding scale   SubCutaneous every 6 hours  vasopressin Infusion 0.04 Unit(s)/Min (2.4 mL/Hr) IV Continuous <Continuous>    ======================= LINES/TUBES  =====================  [place]: (Date placed)  Central Line: (Date placed)  HD Catheter: (Date placed)  Arterial Line: (Date placed)  Endotracheal Tube: (Date placed)  Lopez: (Date placed)  ======================= DISPOSITION  =====================  [ ] Critical   [ ] Guarded    [ ] Stable    [ ] Maintain in CICU  [ ] Downgrade to [place]  [ ] Discharge Home   SCARLETT GARY  MRN-[id number]  Patient is a 71y old  Male who presents with a chief complaint of SOB and hypoxia, COVID (04 May 2021 11:27)    HPI: 71M Hx HTN, HLD, CAD s/p stents [2011, on ASA and Plavix], DM type 2 presents with SOB and hypoxia. Patient has been having nonproductive cough and myalgias since April 5th. He was initially treated for allergies without any symptomatic improvement. He then developed SOB 1 week ago that has progressively worsened during the week. He was tested positive for COVID about 2 days ago. On Saturday, he was faring even worse, having difficulty breathing, so his daughter became worried and called EMS. He was found to have O2 sat of 53% on RA when EMS arrived. He was placed on NRB. Denies falls, LOC, chest pain, abdominal pain, nausea, vomiting, melena, hematochezia, dysuria or calf tenderness.  Patient any hx of afib. He states he only had stents placed in the past at [facility].  ED course: Patient had sinus tachycardia/NSR when initially arrived to ED. He then converted to afib. Patient also was hypoxemic in ED. His NRB was upgraded to HFNC. He was desatting to 89 on HFNC, so he also had NRB placed on top of HFNC. His sat improved 100% after that.  Patient received 1 dose of Pfizer vaccine in March and was supposed to get second dose this month.  (18 Apr 2021 01:09)    REVIEW OF SYSTEMS:  - Unable to obtain, intubated/sedated     ICU Vital Signs Last 24 Hrs  T(C): 37.4 (05 May 2021 06:00), Max: 37.4 (05 May 2021 06:00)  T(F): 99.3 (05 May 2021 06:00), Max: 99.3 (05 May 2021 06:00)  HR: 72 (05 May 2021 06:00) (67 - 111)  ABP: 109/56 (05 May 2021 06:00) (82/52 - 136/68)  ABP(mean): 75 (05 May 2021 06:00) (61 - 93)  RR: 34 (04 May 2021 19:00) (24 - 34)  SpO2: 88% (05 May 2021 06:00) (88% - 99%)    Mode: AC/ CMV (Assist Control/ Continuous Mandatory Ventilation)  RR (machine): 34, TV (machine): 400, FiO2: 70, PEEP: 8, ITime: 0.68    I&O's Summary    04 May 2021 07:01  -  05 May 2021 07:00  --------------------------------------------------------  IN: 3439.1 mL / OUT: 1020 mL / NET: 2419.1 mL      CAPILLARY BLOOD GLUCOSE  POCT Blood Glucose.: 169 mg/dL (05 May 2021 05:34)    PHYSICAL EXAM:      ============================I/O===========================   I&O's Detail    04 May 2021 07:01  -  05 May 2021 07:00  --------------------------------------------------------  IN:    Amiodarone: 317.4 mL    Cisatracurium: 405.6 mL    Enteral Tube Flush: 100 mL    FentaNYL: 121.8 mL    Glucerna: 360 mL    Phenylephrine: 1858 mL    Propofol: 218.7 mL    Vasopressin: 57.6 mL  Total IN: 3439.1 mL    OUT:    Indwelling Catheter - Urethral (mL): 1020 mL  Total OUT: 1020 mL    Total NET: 2419.1 mL  ============================ LABS =========================                  9.7    8.51  )-----------( 114      ( 05 May 2021 04:04 )             30.9     05-05    129<L>  |  95<L>  |  50<H>  ----------------------------<  180<H>  4.8   |  23  |  0.73    Ca    7.5<L>      05 May 2021 04:04  Phos  3.0     05-05  Mg     2.7     05-05    TPro  5.4<L>  /  Alb  1.6<L>  /  TBili  1.6<H>  /  DBili  x   /  AST  2308<H>  /  ALT  783<H>  /  AlkPhos  148<H>  05-05    LIVER FUNCTIONS - ( 05 May 2021 04:04 )  Alb: 1.6 g/dL / Pro: 5.4 g/dL / ALK PHOS: 148 U/L / ALT: 783 U/L / AST: 2308 U/L / GGT: x           PT/INR - ( 05 May 2021 04:04 )   PT: 20.3 sec;   INR: 1.82 ratio    PTT - ( 04 May 2021 01:11 )  PTT:58.1 sec  ABG - ( 05 May 2021 04:04 )  pH, Arterial: 7.33  pH, Blood: x     /  pCO2: 53    /  pO2: 69    / HCO3: 25    / Base Excess: 1.8   /  SaO2: 91.8      Blood Gas Arterial, Lactate: 4.2 mmol/L (05-05-21 @ 04:04)  Blood Gas Arterial, Lactate: 2.8 mmol/L (05-04-21 @ 18:53)  Blood Gas Arterial, Lactate: 2.6 mmol/L (05-04-21 @ 12:55)  Blood Gas Arterial, Lactate: 2.2 mmol/L (05-04-21 @ 04:29)  Blood Gas Arterial, Lactate: 2.0 mmol/L (05-04-21 @ 01:11)  Blood Gas Arterial, Lactate: 2.8 mmol/L (05-03-21 @ 19:39)  Blood Gas Arterial, Lactate: 2.1 mmol/L (05-03-21 @ 12:07)  Blood Gas Arterial, Lactate: 2.5 mmol/L (05-02-21 @ 23:18)  ======================Micro/Rad/Cardio=================  Telemetry: Reviewed   EKG: Reviewed  CXR: Reviewed  Culture: Reviewed   Echo: Transthoracic Echocardiogram:   Patient name: SCARLETT GARY  YOB: 1950   Age: 71 (M)   MR#: [id number]  Study Date: 4/19/2021  Location: [facility] CovidSonographer: Jerri Iqbal [facility]  Study quality: Technically good  Referring Physician: Miranda Gary MD  Blood Pressure: 125/72 mmHg  Height: 172 cm  Weight: 70 kg  BSA: 1.8 m2  ------------------------------------------------------------------------  PROCEDURE: Transthoracic echocardiogram with 2-D, M-Mode  and complete spectral and color flow Doppler.  INDICATION: Unspecified atrial fibrillation (I48.91)  ------------------------------------------------------------------------  DIMENSIONS:  Dimensions:     Normal Values:  LA:     3.1 cm    2.0 - 4.0 cm  Ao:     3.1 cm    2.0 - 3.8 cm  SEPTUM: 0.8 cm    0.6 - 1.2 cm  PWT:    1.0 cm    0.6 - 1.1 cm  LVIDd:  4.7 cm    3.0 - 5.6 cm  LVIDs:  3.3 cm    1.8 - 4.0 cm  Derived Variables:  LVMI: 78 g/m2  RWT: 0.42  Fractional short: 30 %  Ejection Fraction (Gonzálezicholtz): 57 %  ------------------------------------------------------------------------  OBSERVATIONS:  Mitral Valve: Mitral annular calcification, otherwise  normal mitral valve. Minimal mitral regurgitation.  Aortic Root: Normal aortic root.  Aortic Valve: Calcified trileaflet aortic valve with normal  opening. Minimal aortic regurgitation.  Left Atrium: Normal left atrium.  LA volume index = 23  cc/m2.  Left Ventricle: Normal left ventricular systolic function.  No segmental wall motion abnormalities. Normal left  ventricular internal dimensions and wall thicknesses.  Right Heart: Normal right atrium. Normal right ventricular  size and function. Normal tricuspid valve. Normal pulmonic  valve. Mild pulmonic regurgitation.  Pericardium/PleuraNormal pericardium with no pericardial  effusion.  ------------------------------------------------------------------------  CONCLUSIONS:  1. Mitral annular calcification, otherwise normal mitral  valve. Minimal mitral regurgitation.  2. Calcified trileaflet aortic valve with normal opening.  Minimal aorticregurgitation.  3. Normal left ventricular internal dimensions and wall  thicknesses.  4. Normal left ventricular systolic function. No segmental  wall motion abnormalities.  5. Normal right ventricular size and function.  ======================================================  PAST MEDICAL & SURGICAL HISTORY:  Hypertension  CAD (coronary artery disease)  s/p stents at [facility]  Diabetes mellitus, type 2  Hyperlipidemia  S/P coronary artery stent placement SCARLETT GARY  MRN-[id number]  Patient is a 71y old  Male who presents with a chief complaint of SOB and hypoxia, COVID (04 May 2021 11:27)    HPI: 71M Hx HTN, HLD, CAD s/p stents [2011, on ASA and Plavix], DM type 2 presents with SOB and hypoxia. Patient has been having nonproductive cough and myalgias since April 5th. He was initially treated for allergies without any symptomatic improvement. He then developed SOB 1 week ago that has progressively worsened during the week. He was tested positive for COVID about 2 days ago. On Saturday, he was faring even worse, having difficulty breathing, so his daughter became worried and called EMS. He was found to have O2 sat of 53% on RA when EMS arrived. He was placed on NRB. Denies falls, LOC, chest pain, abdominal pain, nausea, vomiting, melena, hematochezia, dysuria or calf tenderness.  Patient any hx of afib. He states he only had stents placed in the past at [facility].  ED course: Patient had sinus tachycardia/NSR when initially arrived to ED. He then converted to afib. Patient also was hypoxemic in ED. His NRB was upgraded to HFNC. He was desatting to 89 on HFNC, so he also had NRB placed on top of HFNC. His sat improved 100% after that.  Patient received 1 dose of Pfizer vaccine in March and was supposed to get second dose this month.  (18 Apr 2021 01:09)    REVIEW OF SYSTEMS:  - Unable to obtain, intubated/sedated     ICU Vital Signs Last 24 Hrs  T(C): 37.4 (05 May 2021 06:00), Max: 37.4 (05 May 2021 06:00)  T(F): 99.3 (05 May 2021 06:00), Max: 99.3 (05 May 2021 06:00)  HR: 72 (05 May 2021 06:00) (67 - 111)  ABP: 109/56 (05 May 2021 06:00) (82/52 - 136/68)  ABP(mean): 75 (05 May 2021 06:00) (61 - 93)  RR: 34 (04 May 2021 19:00) (24 - 34)  SpO2: 88% (05 May 2021 06:00) (88% - 99%)    Mode: AC/ CMV (Assist Control/ Continuous Mandatory Ventilation)  RR (machine): 34, TV (machine): 400, FiO2: 70, PEEP: 8, ITime: 0.68    I&O's Summary    04 May 2021 07:01  -  05 May 2021 07:00  --------------------------------------------------------  IN: 3439.1 mL / OUT: 1020 mL / NET: 2419.1 mL      CAPILLARY BLOOD GLUCOSE  POCT Blood Glucose.: 169 mg/dL (05 May 2021 05:34)    PHYSICAL EXAM:  General: Critically ill, intubated   Neurology: Paralyzed, sedated RASS -3  Respiratory: CTA B/L, no rhonchi/wheezing   CV: Irreg/irregular S1S2, no murmurs/rubs   Abdominal: Soft, NT, ND hypoactive BS  Extremities: Trace edema, +distal pulses  : Lopez catheter intact/patent   ============================I/O===========================   I&O's Detail    04 May 2021 07:01  -  05 May 2021 07:00  --------------------------------------------------------  IN:    Amiodarone: 317.4 mL    Cisatracurium: 405.6 mL    Enteral Tube Flush: 100 mL    FentaNYL: 121.8 mL    Glucerna: 360 mL    Phenylephrine: 1858 mL    Propofol: 218.7 mL    Vasopressin: 57.6 mL  Total IN: 3439.1 mL    OUT:    Indwelling Catheter - Urethral (mL): 1020 mL  Total OUT: 1020 mL    Total NET: 2419.1 mL  ============================ LABS =========================                  9.7    8.51  )-----------( 114      ( 05 May 2021 04:04 )             30.9     05-05    129<L>  |  95<L>  |  50<H>  ----------------------------<  180<H>  4.8   |  23  |  0.73    Ca    7.5<L>      05 May 2021 04:04  Phos  3.0     05-05  Mg     2.7     05-05    TPro  5.4<L>  /  Alb  1.6<L>  /  TBili  1.6<H>  /  DBili  x   /  AST  2308<H>  /  ALT  783<H>  /  AlkPhos  148<H>  05-05    LIVER FUNCTIONS - ( 05 May 2021 04:04 )  Alb: 1.6 g/dL / Pro: 5.4 g/dL / ALK PHOS: 148 U/L / ALT: 783 U/L / AST: 2308 U/L / GGT: x           PT/INR - ( 05 May 2021 04:04 )   PT: 20.3 sec;   INR: 1.82 ratio    PTT - ( 04 May 2021 01:11 )  PTT:58.1 sec  ABG - ( 05 May 2021 04:04 )  pH, Arterial: 7.33  pH, Blood: x     /  pCO2: 53    /  pO2: 69    / HCO3: 25    / Base Excess: 1.8   /  SaO2: 91.8      Blood Gas Arterial, Lactate: 4.2 mmol/L (05-05-21 @ 04:04)  Blood Gas Arterial, Lactate: 2.8 mmol/L (05-04-21 @ 18:53)  Blood Gas Arterial, Lactate: 2.6 mmol/L (05-04-21 @ 12:55)  Blood Gas Arterial, Lactate: 2.2 mmol/L (05-04-21 @ 04:29)  Blood Gas Arterial, Lactate: 2.0 mmol/L (05-04-21 @ 01:11)  Blood Gas Arterial, Lactate: 2.8 mmol/L (05-03-21 @ 19:39)  Blood Gas Arterial, Lactate: 2.1 mmol/L (05-03-21 @ 12:07)  Blood Gas Arterial, Lactate: 2.5 mmol/L (05-02-21 @ 23:18)  ======================Micro/Rad/Cardio=================  Telemetry: Reviewed   EKG: Reviewed  CXR: Reviewed  Culture: Reviewed   Echo: Transthoracic Echocardiogram:   Patient name: SCARLETT GARY  YOB: 1950   Age: 71 (M)   MR#: [id number]  Study Date: 4/19/2021  [facility] CovidSonographer: Jerri Iqbal TORIN  Study quality: Technically good  Referring Physician: Miranda Gary MD  Blood Pressure: 125/72 mmHg  Height: 172 cm  Weight: 70 kg  BSA: 1.8 m2  ------------------------------------------------------------------------  PROCEDURE: Transthoracic echocardiogram with 2-D, M-Mode  and complete spectral and color flow Doppler.  INDICATION: Unspecified atrial fibrillation (I48.91)  ------------------------------------------------------------------------  DIMENSIONS:  Dimensions:     Normal Values:  LA:     3.1 cm    2.0 - 4.0 cm  Ao:     3.1 cm    2.0 - 3.8 cm  SEPTUM: 0.8 cm    0.6 - 1.2 cm  PWT:    1.0 cm    0.6 - 1.1 cm  LVIDd:  4.7 cm    3.0 - 5.6 cm  LVIDs:  3.3 cm    1.8 - 4.0 cm  Derived Variables:  LVMI: 78 g/m2  RWT: 0.42  Fractional short: 30 %  Ejection Fraction (Teicholtz): 57 %  ------------------------------------------------------------------------  OBSERVATIONS:  Mitral Valve: Mitral annular calcification, otherwise  normal mitral valve. Minimal mitral regurgitation.  Aortic Root: Normal aortic root.  Aortic Valve: Calcified trileaflet aortic valve with normal  opening. Minimal aortic regurgitation.  Left Atrium: Normal left atrium.  LA volume index = 23  cc/m2.  Left Ventricle: Normal left ventricular systolic function.  No segmental wall motion abnormalities. Normal left  ventricular internal dimensions and wall thicknesses.  Right Heart: Normal right atrium. Normal right ventricular  size and function. Normal tricuspid valve. Normal pulmonic  valve. Mild pulmonic regurgitation.  Pericardium/PleuraNormal pericardium with no pericardial  effusion.  ------------------------------------------------------------------------  CONCLUSIONS:  1. Mitral annular calcification, otherwise normal mitral  valve. Minimal mitral regurgitation.  2. Calcified trileaflet aortic valve with normal opening.  Minimal aorticregurgitation.  3. Normal left ventricular internal dimensions and wall  thicknesses.  4. Normal left ventricular systolic function. No segmental  wall motion abnormalities.  5. Normal right ventricular size and function.  ======================================================  PAST MEDICAL & SURGICAL HISTORY:  Hypertension  CAD (coronary artery disease)  s/p stents at [facility]  Diabetes mellitus, type 2  Hyperlipidemia  S/P coronary artery stent placement

## 2021-05-05 NOTE — PROGRESS NOTE ADULT - PROVIDER SPECIALTY LIST ADULT
Critical Care
Critical Care
Cardiology
Critical Care
Hospitalist

## 2021-05-05 NOTE — CHART NOTE - NSCHARTNOTEFT_GEN_A_CORE
Thai  #[id number]   Spoke to HCP daughter Dante Gary [phone] via Thai , granddaughter Susy Cuevas (Henna) also present on phone call - discussed goals of care.  Patient with severe end staged ARDS on maximal ventilatory support with no improvement, has grave prognosis.  All attempts have been made to increase vent compliance however no interventions have been successful including proning patient.  I discussed DNR given if cardiopulmonary arrest were to occur, patient most likely would not survive given grave prognosis.  I also discussed the option of withdrawing care as francisco Howell said she would like to "be with her father when he passes away".  Dante requesting if she can make the discussion about DNR/DNI in addition to withdrawing care once she is by her fathers side.  All questions/concerns have been addressed with family.  Will make arrangements for family to come visit patient today. Vietnamese  #[id number]   Spoke to HCP daughter Dante Gary [phone] via Vietnamese , granddaughter Susy Cuevas (Henna) also present on phone call - discussed goals of care.  Patient with severe end staged ARDS on maximal ventilatory support with no improvement, in addition to being in multiorgan failure with grave prognosis.  All attempts have been made to increase vent compliance however no interventions have been successful including proning patient.  I discussed DNR given if cardiopulmonary arrest were to occur, patient most likely would not survive given grave prognosis.  I also discussed the option of withdrawing care as francisco Howell said she would like to "be with her father when he passes away".  Dante requesting if she can make the discussion about DNR/DNI in addition to withdrawing care once she is by her fathers side.  All questions/concerns have been addressed with family.  Will make arrangements for family to come visit patient today. Albanian  #[id number]   Spoke to HCP daughter Dante Gary [phone] via Albanian , granddaughter Susy Cuevas (Henna) also present on phone call - discussed goals of care.  Patient with severe end staged ARDS on maximal ventilatory support with no improvement, in addition to being in multiorgan failure requiring increased pressor requirements.  All attempts have been made to increase vent compliance however no interventions have been successful including proning patient.  I discussed DNR given if cardiopulmonary arrest were to occur, patient most likely would not survive given grave prognosis.  I also discussed the option of withdrawing care as francisco Howell said she would like to "be with her father when he passes away".  Dante requesting if she can make the discussion about DNR/DNI in addition to withdrawing care once she is by her fathers side.  All questions/concerns have been addressed with family.  Will make arrangements for family to come visit patient today.

## 2021-05-05 NOTE — PROGRESS NOTE ADULT - ATTENDING COMMENTS
ARF, hypoxia, covid ARDS- improved. O2 down to 50%. , prone again today.  pressures good    shock- septic 2/2 covid +/- bact infection, cont vaso, levo. lactate 3.2. prob fluid responsive try albumin bolus    leukocytosis- spiked on decomp, vanco, zosyn. ecoli/enterobacter PNA, cont zosyn. if no staph by tomorrow can d/c vanco    hyperglycemia- controlled with nph
afib in setting of COVID  AC and rate control as above
ARF, hypoxia, covid ARDS- not much change with proning. see how does today. day 7. inc rate to get ph > 7.3    shock- septic 2/2 covid +/- bact infection, error yest, still on vaso. alb low. add 25% bid    leukocytosis- stable at 13.  zosyn day 6/7 ecoli/ enterobacter PNA    hyperglycemia- controlled with nph    afib- back in AF. restart amio gtt.     hyponatremia- salt tabs
ARF, hypoxia, covid ARDS- improved. O2 down to 50%. , hold. inc rate for acidosis and d/c nimbex.  pressures good    shock- septic 2/2 covid +/- bact infection, cont vaso, levo. lactate 3.2. post fluid 2.4    leukocytosis- spiked on decomp, vanco, zosyn. ecoli/enterobacter PNA, cont zosyn. if no staph check mrsa PCR if - d/c vanco    hyperglycemia- controlled with nph    afib- no recurrence d/c hep gtt prophylax with lovenox
ARF, hypoxia, covid ARDS- proned, improved PF. may not need proning tonight    shock- septic 2/2 covid +/- bact infection, much improved, low dose levo    leukocytosis- was nrml now 13.  zosyn day 5 ecoli PNA    hyperglycemia- controlled with nph    afib- recurred. heparin gtt. rate controlled. change amio to po
ARF, hypoxia, covid ARDS- requiring 100% O2. will be pronedd. pressures good    shock- septic 2/2 covid +/- bact infection, cont vaso, levo.    leukocytosis- spiked on decomp, vanco, zosyn. cx pending    hyperglycemia- controlled
intubated 65% +8  proned  sedated on fent prop cis drip   levo vaso   s/p lasix 40 yesterday     Covid PNA  resp failure acute hypoxic  volume overload  ecoli/enterococcal pna  afib slow    # CVS- follow hr (? from propofol- not on ny agents)  # resp FiO2- to supine at 2pm- will recheck abg and see if proning will still be beneficial given intubation time  would have trach discussion/planning if inline with families goc  # neuro cont cis and fent prop -  # renal - follow uop redose lasix prn  # ID cont zosyn for pnas  # guarded prognosis  # cont care in ICU
intubated 50% +8  sedated on fent prop cis drip   levo vaso   s/p lasix 40 yesterday   in prone position    Covid PNA  resp failure acute hypoxic  volume overload  ecoli/enterococcal pna  afib slow    # cVS- follow hr (? from propofol- not on ny agents)  would d/c solucortef  we d/c'd vaso  # resp FiO2 decreasing- to supine at 2pm  follow abg for proning needs in 6 hours  # neuro cont cis and fent prop -  # renal - follow uop redose lasix prn  # ID cont zosyn for pnas  # guarded prognosis  # cont care in ICU
ARF, hypoxia, covid ARDS- not much change with proning. ABGs essentially changed. stop proning    shock- septic 2/2 covid +/- bact infection,  no change    leukocytosis- stable 13.  zosyn day 7/7 ecoli/ enterobacter PNA.d/c    hyperglycemia- controlled with nph    afib- still in AF. rate marginally controlled. try dig as needed to d/c amio.    hyponatremia- salt tabs. better    inc LFTs- rapid rise. ? thrombosis. on therapeutic lovenox. f/u US. d/c amiodarone    slowly declining. poor px.

## 2021-05-05 NOTE — CHART NOTE - NSCHARTNOTESELECT_GEN_ALL_CORE
Event Note
Follow Up/Nutrition Services
GOC/Event Note
Acceptance Note/Event Note
Event Note
Event Note
Follow Up/Nutrition Services
GOC/Event Note
MICU/Event Note

## 2021-05-05 NOTE — PROGRESS NOTE ADULT - REASON FOR ADMISSION
SOB and hypoxia, COVID

## 2021-05-05 NOTE — PROGRESS NOTE ADULT - NSICDXPILOT_GEN_ALL_CORE
[place]

## 2021-05-05 NOTE — PROGRESS NOTE ADULT - ASSESSMENT
A/P: 71M Hx HTN, HLD, CAD/PCI on DAPT, T2DM p/w acute hypoxic respiratory failure in setting of Covid PNA requiring intubation.     #Neuro  Sedation  - Paralyzed on Nimbex for vent desynchrony   - Remains sedated on Fentanyl and Propofol   - DC paralytic, attempt to wean sedation      #Pulm   Acute Respiratory Failure in setting of Covid/PNA, severe ARDS  - Intubated (4/28) AC: 400/34/8/70 p/f 98 peak: 36 plat: 32  - p/f not improving with paralytic/proning- end stage ARDS  - Vent management per ABG, pulm toileting     #CV  Hypotension in setting of vasodilatory shock vs. medication induced   - Requiring Vaso and Marcio to maintain MAP > 65, will attempt to wean     PAF, intermittent RVR  - DC Amio (shock liver), dig load, EP consult   - c/w therapeutic Lovenox, monitor H/H     CAD/PCI ?2011  - unclear reason for DAPT, consider DC Plavix   - c/w low dose ASA, DC statin (shock liver)    #GI  - Tolerating TF at trickle: Glucerna 30ml/hr   - bowel regimen, PPI    Shock liver, possible medication induced?  - DC Amio and Lipitor - avoid hepatotoxins   - RUQ US pending     #Renal   - SCr 0.73 stable - strict I/os, target net positive   - POCUS today     Hyponatremia hypovolemia   - c/w Albumin challenge, NaCL tabs 1gm q6h   - Urine lytes note, trend Na q12h    #ID  COVID-19  - complete RDV and Decadron course   - Inflammatory markers q72h    Ecoli/enterobacter PNA  - c/w Zosyn - to complete last day today   - Low grade fevers, uptrend procal/lactate   - f/u BCx sent and infectious work up   - BCx sent, f/u infectious w/u    #Heme  Anemia; multifactorial   - Trend CBC, keep Hgb > 7, transfuse prn   - Maintain active T&S    Hypercoagulable state in setting of Covid-19  - D-dimer slight uptrend, trend q72h   - DVT ppx: on therapeutic Lovenox     #Endo  Hx T2DM  - Stable, c/w ISS mod coverage prn , A1c     ETHICS/DISPOSITION:  - Full code    LINES:  [facility] TLC (5/2)  LRA A-line (5/3)  ======================= DISPOSITION  =====================  [X ] Critical   [ ] Guarded    [ ] Stable    [X ] Maintain in ICU  [ ] Downgrade to Medicine   [ ] Downgrade to RCU    [facility]  ICU

## 2021-05-05 NOTE — DISCHARGE NOTE FOR THE EXPIRED PATIENT - HOSPITAL COURSE
HPI: 71M Hx HTN, HLD, CAD s/p stents [, on ASA and Plavix], DM type 2 presents with SOB and hypoxia. Patient has been having nonproductive cough and myalgias since . He was initially treated for allergies without any symptomatic improvement. He then developed SOB 1 week ago that has progressively worsened during the week. He was tested positive for COVID about 2 days ago. On Saturday, he was faring even worse, having difficulty breathing, so his daughter became worried and called EMS. He was found to have O2 sat of 53% on RA when EMS arrived. He was placed on NRB. Denies falls, LOC, chest pain, abdominal pain, nausea, vomiting, melena, hematochezia, dysuria or calf tenderness.  Patient any hx of afib. He states he only had stents placed in the past at [facility].  ED course: Patient had sinus tachycardia/NSR when initially arrived to ED. He then converted to afib. Patient also was hypoxemic in ED. His NRB was upgraded to HFNC. He was desatting to 89 on HFNC, so he also had NRB placed on top of HFNC. His sat improved 100% after that.  Patient received 1 dose of Pfizer vaccine in March and was supposed to get second dose this month.  (2021 01:09)    Nepali  #[id number]   Spoke to HCP daughter Dante Gary [phone] via Nepali , granddaughter Susy Cuevas (Henna) also present on phone call - discussed goals of care.  Patient with severe end staged ARDS on maximal ventilatory support with no improvement, in addition to being in multiorgan failure requiring increased pressor requirements.  All attempts have been made to increase vent compliance however no interventions have been successful including proning patient.  I discussed DNR given if cardiopulmonary arrest were to occur, patient most likely would not survive given grave prognosis.  I also discussed the option of withdrawing care as daughter Dante said she would like to "be with her father when he passes away".  Dante requesting if she can make the discussion about DNR/DNI in addition to withdrawing care once she is by her fathers side.  All questions/concerns have been addressed with family.  Will make arrangements for family to come visit patient today.    Nepali  #[id number]  Family/HCP at patients bedside.  Addressed goals of care.  Patient made DNR/DNI with comfort measures only.  Molst form placed in chart.  All questions/concerns addressed with family.  Will proceed with terminal extubation per family/HCP wishes with comfort measures.  Sherwood called to bedside per family request.  Attending made aware.    Terminally extubated to comfort care.    with family/HCP present at bedside. HPI: 71M Hx HTN, HLD, CAD s/p stents [, on ASA and Plavix], DM type 2 presents with SOB and hypoxia. Patient has been having nonproductive cough and myalgias since . He was initially treated for allergies without any symptomatic improvement. He then developed SOB 1 week ago that has progressively worsened during the week. He was tested positive for COVID about 2 days ago. On Saturday, he was faring even worse, having difficulty breathing, so his daughter became worried and called EMS. He was found to have O2 sat of 53% on RA when EMS arrived. He was placed on NRB. Denies falls, LOC, chest pain, abdominal pain, nausea, vomiting, melena, hematochezia, dysuria or calf tenderness.  Patient any hx of afib. He states he only had stents placed in the past at [facility].  ED course: Patient had sinus tachycardia/NSR when initially arrived to ED. He then converted to afib. Patient also was hypoxemic in ED. His NRB was upgraded to HFNC. He was desatting to 89 on HFNC, so he also had NRB placed on top of HFNC. His sat improved 100% after that.  Patient received 1 dose of Pfizer vaccine in March and was supposed to get second dose this month.  (2021 01:09)    Indonesian  #[id number]   Spoke to HCP daughter Dante Gary [phone] via Indonesian , granddaughter Susy Cuevas (Henna) also present on phone call - discussed goals of care.  Patient with severe end staged ARDS on maximal ventilatory support with no improvement, in addition to being in multiorgan failure requiring increased pressor requirements.  All attempts have been made to increase vent compliance however no interventions have been successful including proning patient.  I discussed DNR given if cardiopulmonary arrest were to occur, patient most likely would not survive given grave prognosis.  I also discussed the option of withdrawing care as daughter Dante said she would like to "be with her father when he passes away".  Dante requesting if she can make the discussion about DNR/DNI in addition to withdrawing care once she is by her fathers side.  All questions/concerns have been addressed with family.  Will make arrangements for family to come visit patient today.    Indonesian  #[id number]  Family/HCP at patients bedside.  Addressed goals of care.  Patient made DNR/DNI with comfort measures only.  Molst form placed in chart.  All questions/concerns addressed with family.  Will proceed with terminal extubation per family/HCP wishes with comfort measures.  Jasper called to bedside per family request.  Attending made aware.    Terminally extubated to comfort care.    with family/HCP present at bedside.    Attending attestation:  I attest I personally attended to the patient today, agree with the above.    Brady Dowd

## 2021-05-05 NOTE — CHART NOTE - NSCHARTNOTEFT_GEN_A_CORE
Bengali  #[id number]  Family/HCP at patients bedside.  Addressed goals of care.  Patient made DNR/DNI with comfort measures only.  Molst form placed in chart.  All questions/concerns addressed with family.  Will proceed with terminal extubation per family/HCP wishes with comfort measures.  Pearl called to bedside per family request.  Attending made aware. No

## 2021-05-05 NOTE — CHART NOTE - NSCHARTNOTEFT_GEN_A_CORE
NUTRITION FOLLOW-UP:  Pt. w PMH HTN, HLD, CAD, s/p stents, DM2, presenting with PNA due to COVID19.  Currently intubated/sedated. Attemping to wean sedation.    Receiving Glucerna 1.5 via OGT... per flow sheets was at 30mL/hr (?) then decreased to 20mL then to current of 15mL.  Of note prescribed goal is 20mL/hr.  No noted intolerance to TF (no vomiting/diarrhea/constipation).  Last BM (5/5).   Propofol rate tapered to current of 6.3mL/hr which over 24hrs provides 166 non-nutritive calories from lipid.  Thus, advise increasing goal of Glucerna 1.5 to 30mL/hr with 4 packets NoCarb Prosource per day (will provide ~18 Kcals/kg & ~1.7g protein/kg admission weight).  Add Multivitamin for micronutrient coverage.    Per chart, Pt. with poor prognosis.          Weight:                           Height: 68"                  Ideal Body Weight (IBW): 70kg   70.5kg (4/29)  68.6kg (4/28)  70.3kg (4/17)     Previously Estimated Calorie/Protein needs based on admission weight.  15-20 Kcals/kg = 8171-0526 Kcals/day  1.6-1.8g protein/kg = 112-126g protein/day    Edema:  1+ generalized and 1+ dependent    Skin:   No noted pressure injuries.        Pertinent Medications: MEDICATIONS  (STANDING):  albumin human 25% IVPB 50 milliLiter(s) IV Intermittent every 12 hours  ascorbic acid 500 milliGRAM(s) Oral daily  aspirin  chewable 81 milliGRAM(s) Oral daily  chlorhexidine 0.12% Liquid 15 milliLiter(s) Oral Mucosa every 12 hours  chlorhexidine 4% Liquid 1 Application(s) Topical <User Schedule>  clopidogrel Tablet 75 milliGRAM(s) Oral daily  digoxin  Injectable 250 MICROGram(s) IV Push every 6 hours  enoxaparin Injectable 70 milliGRAM(s) SubCutaneous every 12 hours  fentaNYL   Infusion..... 0.5 MICROgram(s)/kG/Hr (0.7 mL/Hr) IV Continuous <Continuous>  glucagon  Injectable 1 milliGRAM(s) IntraMuscular once  insulin lispro (ADMELOG) corrective regimen sliding scale   SubCutaneous every 6 hours  pantoprazole  Injectable 40 milliGRAM(s) IV Push daily  phenylephrine    Infusion 1 MICROgram(s)/kG/Min (13.2 mL/Hr) IV Continuous <Continuous>  polyethylene glycol 3350 17 Gram(s) Oral daily  propofol Infusion 20 MICROgram(s)/kG/Min (8.44 mL/Hr) IV Continuous <Continuous>  senna 2 Tablet(s) Oral at bedtime  sodium chloride 1 Gram(s) Oral every 6 hours  vasopressin Infusion 0.04 Unit(s)/Min (2.4 mL/Hr) IV Continuous <Continuous>    MEDICATIONS  (PRN):  acetaminophen   Tablet .. 650 milliGRAM(s) Oral every 4 hours PRN Temp greater or equal to 38.5C (101.3F)  sodium chloride 0.65% Nasal 1 Spray(s) Both Nostrils every 4 hours PRN Nasal Congestion    Pertinent Labs:  05-05 Na129 mmol/L<L> Glu 180 mg/dL<H> K+ 4.8 mmol/L Cr  0.73 mg/dL BUN 50 mg/dL<H> 05-05 Phos 3.0 mg/dL 05-05 Alb 1.6 g/dL<L> 05-04 Chol --    LDL --    HDL --    Trig 172 mg/dL<H>        CAPILLARY BLOOD GLUCOSE      POCT Blood Glucose.: 221 mg/dL (05 May 2021 12:19)  POCT Blood Glucose.: 169 mg/dL (05 May 2021 05:34)  POCT Blood Glucose.: 200 mg/dL (04 May 2021 22:47)  POCT Blood Glucose.: 159 mg/dL (04 May 2021 17:02)      Diet, NPO with Tube Feed:   Tube Feeding Modality: Orogastric  Glucerna 1.5 Carlos (GLUCERNA1.5RTH)  Total Volume for 24 Hours (mL): 480  Continuous  Starting Tube Feed Rate {mL per Hour}: 10  Increase Tube Feed Rate by (mL): 10  Until Goal Tube Feed Rate (mL per Hour): 20  Tube Feed Duration (in Hours): 24  Tube Feed Start Time: 11:00  No Carb Prosource (1pkg = 15gms Protein)     Qty per Day:  5 (04-29-21 @ 10:21)                      [provides 720 Kcals & total of 115g protein]           PLAN/RECOMMENDATIONS:    1) Modify enteral regimen to Glucerna 1.5 with increased goal to 30mL/hr continuously + 4 packets No Carb Prosource per day              [provides 720mL total volume, 1080 Kcals (+166 Kcals from Propofol= 1246 total calories), 119g protein & 546mL free water, daily]                      **gives ~18 Kcals/kg & ~1.7 g protein/kg actual weight**  2) Obtain daily weights, as feasible  3) Monitor tolerance to TF  4) Add Multivitamin     RDN remains available and will f/u PRN.          Layla Acevedo, JUAN C, CDN pager [phone]

## 2021-05-08 LAB
CULTURE RESULTS: SIGNIFICANT CHANGE UP
SPECIMEN SOURCE: SIGNIFICANT CHANGE UP
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
## 2022-03-27 NOTE — H&P ADULT - HISTORY OF PRESENT ILLNESS
THE [facility] - Acute Rehab Unit   After review, this patient is felt to be:       [x]  Appropriate for Acute Inpatient Rehab    []  Appropriate for Acute Inpatient Rehab Pending Insurance Authorization    []  Not appropriate for Acute Inpatient Rehab    []  Referral received and ARU reviewing patient; Evaluation ongoing. Pending stable HGB and no other medical concerns. D/w JEFF, Suyapa Bermeo. Will notify DCP with further updates. Thank you for the referral.    Thank you.    Eunice Qiu M.A, CCC-SLP/ CBIS   Clinical Liaison 72 y/o M with hx of HTN, HLD, CAD s/p stents, DM type 2 presents with COVID. Patient has been having cough and myalgias since April 5th. He treated for allergies. He then developed SOB 1 week ago. He was tested positive for COVID about 2 days ago. He complains of worsening SOB and his daughter became worried called EMS. He was found to have O2 sat of 53% on RA when EMS arrived. He was placed on NRB. Denies falls, LOC, chest pain, abdominal pain, nausea, vomiting, melena, hematochezia, dysuria or calf tenderness.     Patient any hx of afib. He states he only had stents placed in the past.     ED course: Patient had sinus tachycardia/NSR when initially arrived to ED. He then converted to afib. Patient also was hypoxemic in ED. His NRB was upgraded to HFNC. He was desatting to 89 on HFNC, so he also had NRB placed on top of HFNC. His sat improved 100% after that. 72 y/o M with hx of HTN, HLD, CAD s/p stents [2011], DM type 2 presents with COVID. Patient has been having cough and myalgias since April 5th. He treated for allergies. He then developed SOB 1 week ago. He was tested positive for COVID about 2 days ago. He complains of worsening SOB and his daughter became worried called EMS. He was found to have O2 sat of 53% on RA when EMS arrived. He was placed on NRB. Denies falls, LOC, chest pain, abdominal pain, nausea, vomiting, melena, hematochezia, dysuria or calf tenderness.     Patient any hx of afib. He states he only had stents placed in the past.     ED course: Patient had sinus tachycardia/NSR when initially arrived to ED. He then converted to afib. Patient also was hypoxemic in ED. His NRB was upgraded to HFNC. He was desatting to 89 on HFNC, so he also had NRB placed on top of HFNC. His sat improved 100% after that. 70 y/o M with hx of HTN, HLD, CAD s/p stents [2011], DM type 2 presents with COVID. Patient has been having cough and myalgias since April 5th. He treated for allergies. He then developed SOB 1 week ago. He was tested positive for COVID about 2 days ago. He complains of worsening SOB and his daughter became worried called EMS. He was found to have O2 sat of 53% on RA when EMS arrived. He was placed on NRB. Denies falls, LOC, chest pain, abdominal pain, nausea, vomiting, melena, hematochezia, dysuria or calf tenderness.     Patient any hx of afib. He states he only had stents placed in the past.     ED course: Patient had sinus tachycardia/NSR when initially arrived to ED. He then converted to afib. Patient also was hypoxemic in ED. His NRB was upgraded to HFNC. He was desatting to 89 on HFNC, so he also had NRB placed on top of HFNC. His sat improved 100% after that.    Patient received 1 dose of Pfizer vaccine in March and was supposed to get second dose this month.  72 y/o M with hx of HTN, HLD, CAD s/p stents [2011, on ASA and Plavix], DM type 2 presents with SOB and hypoxia. Patient has been having nonproductive cough and myalgias since April 5th. He was initially treated for allergies without any symptomatic improvement. He then developed SOB 1 week ago that has progressively worsened during the week. He was tested positive for COVID about 2 days ago. On Saturday, he was faring even worse, having difficulty breathing, so his daughter became worried and called EMS. He was found to have O2 sat of 53% on RA when EMS arrived. He was placed on NRB. Denies falls, LOC, chest pain, abdominal pain, nausea, vomiting, melena, hematochezia, dysuria or calf tenderness.     Patient any hx of afib. He states he only had st[facility] in the past at [facility].     ED course: Patient had sinus tachycardia/NSR when initially arrived to ED. He then converted to afib. Patient also was hypoxemic in ED. His NRB was upgraded to HFNC. He was desatting to 89 on HFNC, so he also had NRB placed on top of HFNC. His sat improved 100% after that.    Patient received 1 dose of Pfizer vaccine in March and was supposed to get second dose this month.

## 2023-05-11 NOTE — PROGRESS NOTE ADULT - PROBLEM SELECTOR PLAN 4
- Patient with Trop: 119, CK negative. Not likely ACS per Cardio.  - Patient denies chest pain, but patient with new afib.  - Given COVID status and elevated pro-BNP with tachycardia and tachypnea --> will obtain CTA chest with IV to r/o PE. On full dose AC.   - Cardiology consulted, appreciate recommendations.   - TTE: no acute findings Other Other

## 2024-04-19 NOTE — DISCHARGE NOTE FOR THE EXPIRED PATIENT - TIME PATIENT HAD NO SPONTANEOUS RESPIRATION AND ABSENCE OF PULSE
How Severe Is Your Cyst?: moderate
Is This A New Presentation, Or A Follow-Up?: Cysts
05-May-2021 18:10

## 2024-05-17 NOTE — ED ADULT TRIAGE NOTE - PATIENT ON (OXYGEN DELIVERY METHOD)
Problem: Adult Inpatient Plan of Care  Goal: Plan of Care Review  Outcome: Progressing  Goal: Patient-Specific Goal (Individualized)  Outcome: Progressing  Goal: Absence of Hospital-Acquired Illness or Injury  Outcome: Progressing  Goal: Optimal Comfort and Wellbeing  Outcome: Progressing  Goal: Readiness for Transition of Care  Outcome: Progressing     Problem: Acute Kidney Injury/Impairment  Goal: Fluid and Electrolyte Balance  Outcome: Progressing  Goal: Improved Oral Intake  Outcome: Progressing  Goal: Effective Renal Function  Outcome: Progressing     Problem: Wound  Goal: Optimal Coping  Outcome: Progressing  Goal: Optimal Functional Ability  Outcome: Progressing  Goal: Absence of Infection Signs and Symptoms  Outcome: Progressing  Goal: Improved Oral Intake  Outcome: Progressing  Goal: Optimal Pain Control and Function  Outcome: Progressing  Goal: Skin Health and Integrity  Outcome: Progressing  Goal: Optimal Wound Healing  Outcome: Progressing     Problem: Fall Injury Risk  Goal: Absence of Fall and Fall-Related Injury  Outcome: Progressing     Problem: Skin Injury Risk Increased  Goal: Skin Health and Integrity  Outcome: Progressing     Problem: Malnutrition  Goal: Improved Nutritional Intake  Outcome: Progressing      mask, nonrebreather

## 2025-02-25 NOTE — PROGRESS NOTE ADULT - PROBLEM SELECTOR PLAN 8
Problem: At Risk for Falls  Goal: Patient does not fall  Outcome: Monitoring/Evaluating progress  Goal: Patient takes action to control fall-related risks  Outcome: Monitoring/Evaluating progress     Problem: At Risk for Injury Due to Fall  Goal: Patient does not fall  Outcome: Monitoring/Evaluating progress  Goal: Takes action to control condition specific risks  Outcome: Monitoring/Evaluating progress  Goal: Verbalizes understanding of fall-related injury personal risks  Description: Document education using the patient education activity  Outcome: Monitoring/Evaluating progress     Problem: Skin Integrity Alteration  Goal: Skin remains intact with no new/deterioration of wound or pressure injury  Outcome: Monitoring/Evaluating progress  Goal: Participates in wound care activities  Outcome: Monitoring/Evaluating progress  Goal: Comfort optimized with pressure injury prevention strategies guided by patient/family preference. (Hospice)  Outcome: Monitoring/Evaluating progress  Goal: Wound care provided to promote comfort needs (Hospice)  Outcome: Monitoring/Evaluating progress     Problem: Diabetes  Goal: Achieves glycemic balance  Description: Goal is to maintain blood sugar within range with no episodes of hypoglycemia  Outcome: Monitoring/Evaluating progress  Goal: Verbalizes/demonstrates understanding of NEW diagnosis of diabetes and management  Description: Document on Patient Education Activity  Outcome: Monitoring/Evaluating progress  Goal: Verbalizes understanding of diabetes management including how to use HbA1C to evaluate status of blood sugar over time (Diabetes is NOT a new diagnosis)  Description: Diabetes Education  Outcome: Monitoring/Evaluating progress  Goal: Demonstrates ability to self-administer insulin  Description: Document on Patient Education Activity  Outcome: Monitoring/Evaluating progress     Problem: Impaired Physical Mobility  Goal: Functional status is maintained or returned to  baseline during hospitalization  Outcome: Monitoring/Evaluating progress  Goal: Tolerates activity for discharge setting with no abnormal symptoms  Outcome: Monitoring/Evaluating progress      - VTE ppx: on heparin gtt  - BPH: c/w Flomax    Spoke w/ granddaughter Ashely on 4/22 and updated plan of care  Reviewed GOC, pt is full code  Pt is high risk for intubation at this time, prognosis guarded